# Patient Record
Sex: FEMALE | Race: WHITE | Employment: UNEMPLOYED | URBAN - NONMETROPOLITAN AREA
[De-identification: names, ages, dates, MRNs, and addresses within clinical notes are randomized per-mention and may not be internally consistent; named-entity substitution may affect disease eponyms.]

---

## 2018-07-09 ENCOUNTER — TELEPHONE (OUTPATIENT)
Dept: NEUROSURGERY | Age: 58
End: 2018-07-09

## 2018-07-10 ENCOUNTER — TELEPHONE (OUTPATIENT)
Dept: NEUROSURGERY | Age: 58
End: 2018-07-10

## 2018-08-03 ENCOUNTER — OFFICE VISIT (OUTPATIENT)
Dept: NEUROSURGERY | Age: 58
End: 2018-08-03
Payer: MEDICAID

## 2018-08-03 ENCOUNTER — HOSPITAL ENCOUNTER (OUTPATIENT)
Dept: GENERAL RADIOLOGY | Age: 58
Discharge: HOME | End: 2018-08-03
Payer: MEDICAID

## 2018-08-03 VITALS
OXYGEN SATURATION: 99 % | HEART RATE: 75 BPM | BODY MASS INDEX: 27.8 KG/M2 | SYSTOLIC BLOOD PRESSURE: 118 MMHG | DIASTOLIC BLOOD PRESSURE: 72 MMHG | WEIGHT: 173 LBS | HEIGHT: 66 IN

## 2018-08-03 DIAGNOSIS — G89.29 CHRONIC MIDLINE LOW BACK PAIN WITH RIGHT-SIDED SCIATICA: ICD-10-CM

## 2018-08-03 DIAGNOSIS — M79.604 RIGHT LEG PAIN: ICD-10-CM

## 2018-08-03 DIAGNOSIS — R26.89 ANTALGIC GAIT: ICD-10-CM

## 2018-08-03 DIAGNOSIS — M54.41 CHRONIC MIDLINE LOW BACK PAIN WITH RIGHT-SIDED SCIATICA: ICD-10-CM

## 2018-08-03 DIAGNOSIS — M43.16 SPONDYLOLISTHESIS OF LUMBAR REGION: ICD-10-CM

## 2018-08-03 DIAGNOSIS — R20.8 DECREASED SENSATION OF LEG: ICD-10-CM

## 2018-08-03 DIAGNOSIS — M54.41 CHRONIC MIDLINE LOW BACK PAIN WITH RIGHT-SIDED SCIATICA: Primary | ICD-10-CM

## 2018-08-03 DIAGNOSIS — G89.29 CHRONIC MIDLINE LOW BACK PAIN WITH RIGHT-SIDED SCIATICA: Primary | ICD-10-CM

## 2018-08-03 DIAGNOSIS — R29.898 RIGHT LEG WEAKNESS: ICD-10-CM

## 2018-08-03 PROCEDURE — G8419 CALC BMI OUT NRM PARAM NOF/U: HCPCS | Performed by: NURSE PRACTITIONER

## 2018-08-03 PROCEDURE — G8427 DOCREV CUR MEDS BY ELIG CLIN: HCPCS | Performed by: NURSE PRACTITIONER

## 2018-08-03 PROCEDURE — 4004F PT TOBACCO SCREEN RCVD TLK: CPT | Performed by: NURSE PRACTITIONER

## 2018-08-03 PROCEDURE — 99204 OFFICE O/P NEW MOD 45 MIN: CPT | Performed by: NURSE PRACTITIONER

## 2018-08-03 PROCEDURE — 3017F COLORECTAL CA SCREEN DOC REV: CPT | Performed by: NURSE PRACTITIONER

## 2018-08-03 PROCEDURE — 72110 X-RAY EXAM L-2 SPINE 4/>VWS: CPT

## 2018-08-03 RX ORDER — LISINOPRIL AND HYDROCHLOROTHIAZIDE 12.5; 1 MG/1; MG/1
TABLET ORAL
COMMUNITY
Start: 2009-12-28

## 2018-08-03 RX ORDER — METFORMIN HYDROCHLORIDE 500 MG/1
500 TABLET, EXTENDED RELEASE ORAL
COMMUNITY

## 2018-08-03 RX ORDER — PRAVASTATIN SODIUM 20 MG
20 TABLET ORAL DAILY
COMMUNITY

## 2018-08-03 RX ORDER — OMEPRAZOLE 20 MG/1
20 CAPSULE, DELAYED RELEASE ORAL 2 TIMES DAILY
COMMUNITY

## 2018-08-03 ASSESSMENT — ENCOUNTER SYMPTOMS
WHEEZING: 1
HEARTBURN: 0
COUGH: 1
SINUS PAIN: 1
CONSTIPATION: 0
BACK PAIN: 1
BLOOD IN STOOL: 0
SHORTNESS OF BREATH: 0
SPUTUM PRODUCTION: 0
STRIDOR: 0
DIARRHEA: 1
NAUSEA: 1
ORTHOPNEA: 0
ABDOMINAL PAIN: 1
HEMOPTYSIS: 0
EYES NEGATIVE: 1
VOMITING: 1
SORE THROAT: 0

## 2018-08-03 NOTE — PROGRESS NOTES
nervous/anxious. The patient does not have insomnia. PHYSICAL EXAM:  Vitals:    08/03/18 1206   BP: 118/72   Pulse: 75   SpO2: 99%     Constitutional: appears well-developed and well-nourished. Eyes  conjunctiva normal.  Pupils react to light  Ear, nose, throat -hearing intact to finger rub, No scars, masses, or lesions over external nose or ears, no atrophy of tongue  Neck-symmetric, no masses noted, no jugular vein distension  Respiration- chest wall appears symmetric, good expansion, normal effort without use of accessory muscles  Musculoskeletal  no significant wasting of muscles noted, no bony deformities, gait no gross ataxia  Extremities-no clubbing, cyanosis or edema  Skin  warm, dry, and intact. No rash, erythema, or pallor. Psychiatric  mood, affect, and behavior appear normal.     Neurologic Examination  Awake, Alert and oriented x 3  Normal speech pattern, following commands  Motor 4+/5 right iliopsoas; pain limited   Decrease to pinprick sensation right lateral shin, calf   Reflexes are 3+ and symmetric BUE, BLE (could be due to SSRI)  No clonus or Hoffmans sign  Severe myofacial tenderness to palpation  Antalgic Gait pattern        DATA and IMAGING:    Nursing/pcp notes, imaging, labs, and vitals reviewed. PT,OT and/or speech notes reviewed    No results found for: WBC, HGB, HCT, MCV, PLTNo results found for: NA, K, CL, CO2, BUN, CREATININE, GLUCOSE, CALCIUM, PROT, LABALBU, BILITOT, ALKPHOS, AST, ALT, LABGLOM, GFRAA, AGRATIO, GLOBNo results found for: INR, PROTIME    XR Lumbar Spine Report from 1/04/2016  Grade I anterolisthesis of L4 on L5     No recent images to view       ASSESSMENT:    Zaid Taylor is a 62 y.o. female with low back pain and right leg pain. ICD-10-CM ICD-9-CM    1. Chronic midline low back pain with right-sided sciatica M54.41 724.2 XR Lumbar Spine Ap Lateral Flexion and Extension    G89.29 724.3 MRI Lumbar Spine WO Contrast     338.29    2.  Right leg

## 2018-08-07 ENCOUNTER — TELEPHONE (OUTPATIENT)
Dept: NEUROSURGERY | Age: 58
End: 2018-08-07

## 2018-08-15 ENCOUNTER — HOSPITAL ENCOUNTER (OUTPATIENT)
Dept: MRI IMAGING | Age: 58
Discharge: HOME | End: 2018-08-15
Payer: MEDICAID

## 2018-08-15 DIAGNOSIS — M79.604 RIGHT LEG PAIN: ICD-10-CM

## 2018-08-15 DIAGNOSIS — M43.16 SPONDYLOLISTHESIS OF LUMBAR REGION: ICD-10-CM

## 2018-08-15 DIAGNOSIS — M54.41 CHRONIC MIDLINE LOW BACK PAIN WITH RIGHT-SIDED SCIATICA: ICD-10-CM

## 2018-08-15 DIAGNOSIS — R26.89 ANTALGIC GAIT: ICD-10-CM

## 2018-08-15 DIAGNOSIS — R20.8 DECREASED SENSATION OF LEG: ICD-10-CM

## 2018-08-15 DIAGNOSIS — G89.29 CHRONIC MIDLINE LOW BACK PAIN WITH RIGHT-SIDED SCIATICA: ICD-10-CM

## 2018-08-15 DIAGNOSIS — R29.898 RIGHT LEG WEAKNESS: ICD-10-CM

## 2018-08-15 PROCEDURE — 72148 MRI LUMBAR SPINE W/O DYE: CPT

## 2018-08-21 ENCOUNTER — OFFICE VISIT (OUTPATIENT)
Dept: NEUROSURGERY | Age: 58
End: 2018-08-21
Payer: MEDICAID

## 2018-08-21 VITALS
DIASTOLIC BLOOD PRESSURE: 73 MMHG | SYSTOLIC BLOOD PRESSURE: 112 MMHG | HEART RATE: 69 BPM | BODY MASS INDEX: 27.64 KG/M2 | WEIGHT: 172 LBS | OXYGEN SATURATION: 97 % | HEIGHT: 66 IN

## 2018-08-21 DIAGNOSIS — R20.8 DECREASED SENSATION OF LEG: ICD-10-CM

## 2018-08-21 DIAGNOSIS — M79.604 RIGHT LEG PAIN: ICD-10-CM

## 2018-08-21 DIAGNOSIS — G89.29 CHRONIC MIDLINE LOW BACK PAIN WITH RIGHT-SIDED SCIATICA: ICD-10-CM

## 2018-08-21 DIAGNOSIS — M54.41 CHRONIC MIDLINE LOW BACK PAIN WITH RIGHT-SIDED SCIATICA: ICD-10-CM

## 2018-08-21 DIAGNOSIS — M43.16 SPONDYLOLISTHESIS AT L4-L5 LEVEL: ICD-10-CM

## 2018-08-21 DIAGNOSIS — R29.898 RIGHT LEG WEAKNESS: ICD-10-CM

## 2018-08-21 DIAGNOSIS — R26.89 ANTALGIC GAIT: ICD-10-CM

## 2018-08-21 DIAGNOSIS — M79.605 LEFT LEG PAIN: ICD-10-CM

## 2018-08-21 DIAGNOSIS — M51.36 DDD (DEGENERATIVE DISC DISEASE), LUMBAR: Primary | ICD-10-CM

## 2018-08-21 PROCEDURE — 99213 OFFICE O/P EST LOW 20 MIN: CPT | Performed by: NEUROLOGICAL SURGERY

## 2018-08-21 PROCEDURE — 3017F COLORECTAL CA SCREEN DOC REV: CPT | Performed by: NEUROLOGICAL SURGERY

## 2018-08-21 PROCEDURE — G8427 DOCREV CUR MEDS BY ELIG CLIN: HCPCS | Performed by: NEUROLOGICAL SURGERY

## 2018-08-21 PROCEDURE — G8419 CALC BMI OUT NRM PARAM NOF/U: HCPCS | Performed by: NEUROLOGICAL SURGERY

## 2018-08-21 PROCEDURE — 4004F PT TOBACCO SCREEN RCVD TLK: CPT | Performed by: NEUROLOGICAL SURGERY

## 2018-08-21 NOTE — PROGRESS NOTES
Follow up
right-sided sciatica M54.41 724.2     G89.29 724.3      338.29    4. Right leg pain M79.604 729.5 Nerve conduction test      NM EMG 5+ Muscles Tested   5. Right leg weakness R29.898 729.89 Nerve conduction test      NM EMG 5+ Muscles Tested   6. Decreased sensation of leg R20.8 782.0 Nerve conduction test      NM EMG 5+ Muscles Tested   7. Antalgic gait R26.89 781.2 Nerve conduction test      NM EMG 5+ Muscles Tested   8. Left leg pain M79.605 729.5 Nerve conduction test      NM EMG 5+ Muscles Tested       PLAN:  -We have discussed and reviewed the results of the MRI lumbar spine with Mrs. Espino at length. We explained that she does have a small slip on her spine, however, given her described pain syndrome and her imaging, we do not feel that a neurosurgical intervention would dramatically improve her pain.   We recommend she continue non-operative treatments such as pain management, PT, massage therapy, etc.   -Obtain NCS/EMG     -Follow up as needed           Sunshine Estrada DO

## 2018-08-22 ENCOUNTER — TELEPHONE (OUTPATIENT)
Dept: NEUROSURGERY | Age: 58
End: 2018-08-22

## 2018-09-20 ENCOUNTER — HOSPITAL ENCOUNTER (OUTPATIENT)
Dept: NEUROLOGY | Age: 58
Discharge: HOME | End: 2018-09-20
Payer: MEDICAID

## 2018-09-20 PROCEDURE — 95910 NRV CNDJ TEST 7-8 STUDIES: CPT | Performed by: PSYCHIATRY & NEUROLOGY

## 2018-09-20 PROCEDURE — 95886 MUSC TEST DONE W/N TEST COMP: CPT | Performed by: PSYCHIATRY & NEUROLOGY

## 2018-09-20 PROCEDURE — 95910 NRV CNDJ TEST 7-8 STUDIES: CPT

## 2018-09-20 PROCEDURE — 95886 MUSC TEST DONE W/N TEST COMP: CPT

## 2018-10-02 ENCOUNTER — OFFICE VISIT (OUTPATIENT)
Dept: NEUROSURGERY | Age: 58
End: 2018-10-02
Payer: MEDICAID

## 2018-10-02 VITALS
BODY MASS INDEX: 27.64 KG/M2 | SYSTOLIC BLOOD PRESSURE: 133 MMHG | HEART RATE: 94 BPM | HEIGHT: 66 IN | OXYGEN SATURATION: 97 % | WEIGHT: 172 LBS | DIASTOLIC BLOOD PRESSURE: 70 MMHG

## 2018-10-02 DIAGNOSIS — M43.16 SPONDYLOLISTHESIS AT L4-L5 LEVEL: ICD-10-CM

## 2018-10-02 DIAGNOSIS — G89.29 CHRONIC MIDLINE LOW BACK PAIN WITH RIGHT-SIDED SCIATICA: ICD-10-CM

## 2018-10-02 DIAGNOSIS — M51.36 DDD (DEGENERATIVE DISC DISEASE), LUMBAR: Primary | ICD-10-CM

## 2018-10-02 DIAGNOSIS — M54.41 CHRONIC MIDLINE LOW BACK PAIN WITH RIGHT-SIDED SCIATICA: ICD-10-CM

## 2018-10-02 PROCEDURE — G8427 DOCREV CUR MEDS BY ELIG CLIN: HCPCS | Performed by: NEUROLOGICAL SURGERY

## 2018-10-02 PROCEDURE — 3017F COLORECTAL CA SCREEN DOC REV: CPT | Performed by: NEUROLOGICAL SURGERY

## 2018-10-02 PROCEDURE — G8484 FLU IMMUNIZE NO ADMIN: HCPCS | Performed by: NEUROLOGICAL SURGERY

## 2018-10-02 PROCEDURE — 99213 OFFICE O/P EST LOW 20 MIN: CPT | Performed by: NEUROLOGICAL SURGERY

## 2018-10-02 PROCEDURE — 4004F PT TOBACCO SCREEN RCVD TLK: CPT | Performed by: NEUROLOGICAL SURGERY

## 2018-10-02 PROCEDURE — G8419 CALC BMI OUT NRM PARAM NOF/U: HCPCS | Performed by: NEUROLOGICAL SURGERY

## 2018-10-02 NOTE — PROGRESS NOTES
weakness. Endo/Heme/Allergies: Positive for environmental allergies. Negative for polydipsia. Does not bruise/bleed easily. Psychiatric/Behavioral: Positive for depression. Negative for hallucinations, memory loss, substance abuse and suicidal ideas. The patient is nervous/anxious. The patient does not have insomnia. PHYSICAL EXAM:  Vitals:    10/02/18 0929   BP: 133/70   Pulse: 94   SpO2: 97%     Constitutional: appears well-developed and well-nourished. Eyes  conjunctiva normal.  Pupils react to light  Ear, nose, throat -hearing intact to finger rub, No scars, masses, or lesions over external nose or ears, no atrophy of tongue  Neck-symmetric, no masses noted, no jugular vein distension  Respiration- chest wall appears symmetric, good expansion, normal effort without use of accessory muscles  Musculoskeletal  no significant wasting of muscles noted, no bony deformities, gait no gross ataxia  Extremities-no clubbing, cyanosis or edema  Skin  warm, dry, and intact. No rash, erythema, or pallor. Psychiatric  mood, affect, and behavior appear normal.     Neurologic Examination  Awake, Alert and oriented x 3  Normal speech pattern, following commands  Motor 4+/5 right iliopsoas; pain limited   Decrease to pinprick sensation right lateral shin, calf   Reflexes are 3+ and symmetric BUE, BLE (could be due to SSRI, states she is on Celexa)  No clonus or Hoffmans sign  Severe myofacial tenderness to palpation  Antalgic Gait pattern        DATA and IMAGING:    Nursing/pcp notes, imaging, labs, and vitals reviewed.      PT,OT and/or speech notes reviewed    No results found for: WBC, HGB, HCT, MCV, PLTNo results found for: NA, K, CL, CO2, BUN, CREATININE, GLUCOSE, CALCIUM, PROT, LABALBU, BILITOT, ALKPHOS, AST, ALT, LABGLOM, GFRAA, AGRATIO, GLOBNo results found for: INR, PROTIME    XR Lumbar Spine Report from 1/04/2016  Grade I anterolisthesis of L4 on L5     Narrative   EXAM: MR LUMBOSACRAL SPINE WITHOUT IV

## 2020-10-18 DIAGNOSIS — R07.2 PRECORDIAL CHEST PAIN: Primary | ICD-10-CM

## 2020-10-18 RX ORDER — SODIUM CHLORIDE 9 MG/ML
75 INJECTION, SOLUTION INTRAVENOUS CONTINUOUS
Status: CANCELLED | OUTPATIENT
Start: 2020-10-18

## 2020-10-19 ENCOUNTER — TRANSCRIBE ORDERS (OUTPATIENT)
Dept: ADMINISTRATIVE | Facility: HOSPITAL | Age: 60
End: 2020-10-19

## 2020-10-19 DIAGNOSIS — Z01.818 PREOPERATIVE TESTING: Primary | ICD-10-CM

## 2020-10-19 PROBLEM — R07.2 PRECORDIAL CHEST PAIN: Status: ACTIVE | Noted: 2020-10-19

## 2020-10-20 ENCOUNTER — LAB (OUTPATIENT)
Dept: LAB | Facility: HOSPITAL | Age: 60
End: 2020-10-20

## 2020-10-20 PROCEDURE — U0003 INFECTIOUS AGENT DETECTION BY NUCLEIC ACID (DNA OR RNA); SEVERE ACUTE RESPIRATORY SYNDROME CORONAVIRUS 2 (SARS-COV-2) (CORONAVIRUS DISEASE [COVID-19]), AMPLIFIED PROBE TECHNIQUE, MAKING USE OF HIGH THROUGHPUT TECHNOLOGIES AS DESCRIBED BY CMS-2020-01-R: HCPCS | Performed by: INTERNAL MEDICINE

## 2020-10-20 PROCEDURE — C9803 HOPD COVID-19 SPEC COLLECT: HCPCS | Performed by: INTERNAL MEDICINE

## 2020-10-21 LAB
COVID LABCORP PRIORITY: NORMAL
SARS-COV-2 RNA RESP QL NAA+PROBE: NOT DETECTED

## 2020-10-23 ENCOUNTER — HOSPITAL ENCOUNTER (OUTPATIENT)
Facility: HOSPITAL | Age: 60
Discharge: HOME | End: 2020-10-23
Attending: INTERNAL MEDICINE | Admitting: INTERNAL MEDICINE

## 2020-10-23 VITALS
OXYGEN SATURATION: 96 % | TEMPERATURE: 97 F | DIASTOLIC BLOOD PRESSURE: 57 MMHG | BODY MASS INDEX: 28.57 KG/M2 | HEART RATE: 63 BPM | RESPIRATION RATE: 17 BRPM | HEIGHT: 66 IN | SYSTOLIC BLOOD PRESSURE: 120 MMHG | WEIGHT: 177.8 LBS

## 2020-10-23 DIAGNOSIS — R07.2 PRECORDIAL CHEST PAIN: ICD-10-CM

## 2020-10-23 LAB
ALBUMIN SERPL-MCNC: 4.2 G/DL (ref 3.5–5.2)
ALBUMIN/GLOB SERPL: 2.1 G/DL
ALP SERPL-CCNC: 84 U/L (ref 39–117)
ALT SERPL W P-5'-P-CCNC: 20 U/L (ref 1–33)
ANION GAP SERPL CALCULATED.3IONS-SCNC: 8 MMOL/L (ref 5–15)
AST SERPL-CCNC: 20 U/L (ref 1–32)
BASOPHILS # BLD AUTO: 0.04 10*3/MM3 (ref 0–0.2)
BASOPHILS NFR BLD AUTO: 0.5 % (ref 0–1.5)
BILIRUB SERPL-MCNC: 0.4 MG/DL (ref 0–1.2)
BUN SERPL-MCNC: 13 MG/DL (ref 8–23)
BUN/CREAT SERPL: 20.3 (ref 7–25)
CALCIUM SPEC-SCNC: 8.9 MG/DL (ref 8.6–10.5)
CHLORIDE SERPL-SCNC: 102 MMOL/L (ref 98–107)
CO2 SERPL-SCNC: 26 MMOL/L (ref 22–29)
CREAT SERPL-MCNC: 0.64 MG/DL (ref 0.57–1)
DEPRECATED RDW RBC AUTO: 43.2 FL (ref 37–54)
EOSINOPHIL # BLD AUTO: 0.33 10*3/MM3 (ref 0–0.4)
EOSINOPHIL NFR BLD AUTO: 4.4 % (ref 0.3–6.2)
ERYTHROCYTE [DISTWIDTH] IN BLOOD BY AUTOMATED COUNT: 13.3 % (ref 12.3–15.4)
GFR SERPL CREATININE-BSD FRML MDRD: 95 ML/MIN/1.73
GLOBULIN UR ELPH-MCNC: 2 GM/DL
GLUCOSE SERPL-MCNC: 127 MG/DL (ref 65–99)
HCT VFR BLD AUTO: 34.6 % (ref 34–46.6)
HGB BLD-MCNC: 12.2 G/DL (ref 12–15.9)
IMM GRANULOCYTES # BLD AUTO: 0.05 10*3/MM3 (ref 0–0.05)
IMM GRANULOCYTES NFR BLD AUTO: 0.7 % (ref 0–0.5)
INR PPP: 0.97 (ref 0.91–1.09)
LYMPHOCYTES # BLD AUTO: 2.17 10*3/MM3 (ref 0.7–3.1)
LYMPHOCYTES NFR BLD AUTO: 29.1 % (ref 19.6–45.3)
MCH RBC QN AUTO: 31.1 PG (ref 26.6–33)
MCHC RBC AUTO-ENTMCNC: 35.3 G/DL (ref 31.5–35.7)
MCV RBC AUTO: 88.3 FL (ref 79–97)
MONOCYTES # BLD AUTO: 0.45 10*3/MM3 (ref 0.1–0.9)
MONOCYTES NFR BLD AUTO: 6 % (ref 5–12)
NEUTROPHILS NFR BLD AUTO: 4.42 10*3/MM3 (ref 1.7–7)
NEUTROPHILS NFR BLD AUTO: 59.3 % (ref 42.7–76)
NRBC BLD AUTO-RTO: 0 /100 WBC (ref 0–0.2)
PLATELET # BLD AUTO: 187 10*3/MM3 (ref 140–450)
PMV BLD AUTO: 10.6 FL (ref 6–12)
POTASSIUM SERPL-SCNC: 3.8 MMOL/L (ref 3.5–5.2)
PROT SERPL-MCNC: 6.2 G/DL (ref 6–8.5)
PROTHROMBIN TIME: 12.5 SECONDS (ref 11.9–14.6)
RBC # BLD AUTO: 3.92 10*6/MM3 (ref 3.77–5.28)
SODIUM SERPL-SCNC: 136 MMOL/L (ref 136–145)
WBC # BLD AUTO: 7.46 10*3/MM3 (ref 3.4–10.8)

## 2020-10-23 PROCEDURE — C1894 INTRO/SHEATH, NON-LASER: HCPCS | Performed by: INTERNAL MEDICINE

## 2020-10-23 PROCEDURE — 93571 IV DOP VEL&/PRESS C FLO 1ST: CPT | Performed by: INTERNAL MEDICINE

## 2020-10-23 PROCEDURE — 93458 L HRT ARTERY/VENTRICLE ANGIO: CPT | Performed by: INTERNAL MEDICINE

## 2020-10-23 PROCEDURE — C1769 GUIDE WIRE: HCPCS | Performed by: INTERNAL MEDICINE

## 2020-10-23 PROCEDURE — 99152 MOD SED SAME PHYS/QHP 5/>YRS: CPT | Performed by: INTERNAL MEDICINE

## 2020-10-23 PROCEDURE — 85610 PROTHROMBIN TIME: CPT | Performed by: INTERNAL MEDICINE

## 2020-10-23 PROCEDURE — 25010000002 MIDAZOLAM HCL (PF) 5 MG/5ML SOLUTION: Performed by: INTERNAL MEDICINE

## 2020-10-23 PROCEDURE — 25010000002 FENTANYL CITRATE (PF) 100 MCG/2ML SOLUTION: Performed by: INTERNAL MEDICINE

## 2020-10-23 PROCEDURE — C1887 CATHETER, GUIDING: HCPCS | Performed by: INTERNAL MEDICINE

## 2020-10-23 PROCEDURE — 0 IOPAMIDOL PER 1 ML: Performed by: INTERNAL MEDICINE

## 2020-10-23 PROCEDURE — 25010000002 ADENOSINE (DIAGNOSTIC) PER 30 MG: Performed by: INTERNAL MEDICINE

## 2020-10-23 PROCEDURE — 25010000002 DIPHENHYDRAMINE PER 50 MG: Performed by: INTERNAL MEDICINE

## 2020-10-23 PROCEDURE — 85025 COMPLETE CBC W/AUTO DIFF WBC: CPT | Performed by: INTERNAL MEDICINE

## 2020-10-23 PROCEDURE — 80053 COMPREHEN METABOLIC PANEL: CPT | Performed by: INTERNAL MEDICINE

## 2020-10-23 PROCEDURE — 99153 MOD SED SAME PHYS/QHP EA: CPT | Performed by: INTERNAL MEDICINE

## 2020-10-23 PROCEDURE — S0260 H&P FOR SURGERY: HCPCS | Performed by: INTERNAL MEDICINE

## 2020-10-23 PROCEDURE — C1760 CLOSURE DEV, VASC: HCPCS | Performed by: INTERNAL MEDICINE

## 2020-10-23 PROCEDURE — 25010000002 HEPARIN (PORCINE) 2000-0.9 UNIT/L-% SOLUTION: Performed by: INTERNAL MEDICINE

## 2020-10-23 RX ORDER — FENTANYL CITRATE 50 UG/ML
INJECTION, SOLUTION INTRAMUSCULAR; INTRAVENOUS AS NEEDED
Status: DISCONTINUED | OUTPATIENT
Start: 2020-10-23 | End: 2020-10-23 | Stop reason: HOSPADM

## 2020-10-23 RX ORDER — ALOGLIPTIN AND METFORMIN HYDROCHLORIDE 12.5; 5 MG/1; MG/1
1 TABLET, FILM COATED ORAL 2 TIMES DAILY
COMMUNITY

## 2020-10-23 RX ORDER — LIDOCAINE HYDROCHLORIDE 20 MG/ML
INJECTION, SOLUTION INFILTRATION; PERINEURAL AS NEEDED
Status: DISCONTINUED | OUTPATIENT
Start: 2020-10-23 | End: 2020-10-23 | Stop reason: HOSPADM

## 2020-10-23 RX ORDER — LISINOPRIL AND HYDROCHLOROTHIAZIDE 12.5; 1 MG/1; MG/1
1 TABLET ORAL DAILY
COMMUNITY

## 2020-10-23 RX ORDER — DIPHENHYDRAMINE HYDROCHLORIDE 50 MG/ML
INJECTION INTRAMUSCULAR; INTRAVENOUS AS NEEDED
Status: DISCONTINUED | OUTPATIENT
Start: 2020-10-23 | End: 2020-10-23 | Stop reason: HOSPADM

## 2020-10-23 RX ORDER — AMLODIPINE BESYLATE 5 MG/1
5 TABLET ORAL DAILY
COMMUNITY

## 2020-10-23 RX ORDER — SODIUM CHLORIDE 9 MG/ML
75 INJECTION, SOLUTION INTRAVENOUS CONTINUOUS
Status: DISCONTINUED | OUTPATIENT
Start: 2020-10-23 | End: 2020-10-23 | Stop reason: HOSPADM

## 2020-10-23 RX ORDER — PRAVASTATIN SODIUM 20 MG
20 TABLET ORAL DAILY
COMMUNITY

## 2020-10-23 RX ORDER — ISOSORBIDE MONONITRATE 30 MG/1
30 TABLET, EXTENDED RELEASE ORAL DAILY
COMMUNITY

## 2020-10-23 RX ORDER — ACETAMINOPHEN 325 MG/1
650 TABLET ORAL EVERY 4 HOURS PRN
Status: DISCONTINUED | OUTPATIENT
Start: 2020-10-23 | End: 2020-10-23 | Stop reason: HOSPADM

## 2020-10-23 RX ORDER — CITALOPRAM 40 MG/1
40 TABLET ORAL DAILY
COMMUNITY

## 2020-10-23 RX ORDER — SODIUM CHLORIDE 9 MG/ML
100 INJECTION, SOLUTION INTRAVENOUS CONTINUOUS
Status: DISCONTINUED | OUTPATIENT
Start: 2020-10-23 | End: 2020-10-23 | Stop reason: HOSPADM

## 2020-10-23 RX ORDER — ASPIRIN 81 MG/1
81 TABLET, CHEWABLE ORAL DAILY
COMMUNITY

## 2020-10-23 RX ORDER — ASPIRIN 81 MG/1
TABLET, CHEWABLE ORAL
Status: COMPLETED
Start: 2020-10-23 | End: 2020-10-23

## 2020-10-23 RX ORDER — OMEPRAZOLE 40 MG/1
40 CAPSULE, DELAYED RELEASE ORAL DAILY
COMMUNITY

## 2020-10-23 RX ORDER — MIDAZOLAM HYDROCHLORIDE 1 MG/ML
INJECTION, SOLUTION INTRAMUSCULAR; INTRAVENOUS AS NEEDED
Status: DISCONTINUED | OUTPATIENT
Start: 2020-10-23 | End: 2020-10-23 | Stop reason: HOSPADM

## 2020-10-23 RX ORDER — ASPIRIN 81 MG/1
81 TABLET, CHEWABLE ORAL ONCE
Status: COMPLETED | OUTPATIENT
Start: 2020-10-23 | End: 2020-10-23

## 2020-10-23 RX ORDER — HEPARIN SODIUM 200 [USP'U]/100ML
INJECTION, SOLUTION INTRAVENOUS AS NEEDED
Status: DISCONTINUED | OUTPATIENT
Start: 2020-10-23 | End: 2020-10-23 | Stop reason: HOSPADM

## 2020-10-23 RX ADMIN — ASPIRIN 81 MG 81 MG: 81 TABLET ORAL at 14:45

## 2020-10-23 RX ADMIN — SODIUM CHLORIDE 75 ML/HR: 9 INJECTION, SOLUTION INTRAVENOUS at 14:00

## 2020-10-23 RX ADMIN — ASPIRIN 81 MG: 81 TABLET, CHEWABLE ORAL at 14:45

## 2024-08-15 ENCOUNTER — TELEPHONE (OUTPATIENT)
Dept: CARDIOLOGY | Facility: CLINIC | Age: 64
End: 2024-08-15
Payer: COMMERCIAL

## 2024-08-15 NOTE — TELEPHONE ENCOUNTER
Records/referral are available to view in media from [place] [facility] to place heart cath orders.    Please place order.    Thank you!    WF

## 2024-08-28 ENCOUNTER — TELEPHONE (OUTPATIENT)
Dept: CARDIOLOGY | Facility: CLINIC | Age: 64
End: 2024-08-28
Payer: COMMERCIAL

## 2024-08-28 DIAGNOSIS — I25.118 CORONARY ARTERY DISEASE OF NATIVE ARTERY OF NATIVE HEART WITH STABLE ANGINA PECTORIS: Primary | ICD-10-CM

## 2024-08-28 RX ORDER — SODIUM CHLORIDE 9 MG/ML
40 INJECTION, SOLUTION INTRAVENOUS AS NEEDED
OUTPATIENT
Start: 2024-08-28

## 2024-08-28 RX ORDER — SODIUM CHLORIDE 0.9 % (FLUSH) 0.9 %
3 SYRINGE (ML) INJECTION EVERY 12 HOURS SCHEDULED
OUTPATIENT
Start: 2024-08-28

## 2024-08-28 RX ORDER — SODIUM CHLORIDE 0.9 % (FLUSH) 0.9 %
10 SYRINGE (ML) INJECTION AS NEEDED
OUTPATIENT
Start: 2024-08-28

## 2024-08-28 NOTE — TELEPHONE ENCOUNTER
Hub staff attempted to follow warm transfer process and was unsuccessful     Caller: AVINASH    Relationship to patient: Provider    Best call back number: [phone]    Patient is needing:   AVINASH FROM [facility] NEEDS TO SPEAK WITH DALIA.

## 2024-09-10 PROBLEM — I25.118 CORONARY ARTERY DISEASE OF NATIVE ARTERY OF NATIVE HEART WITH STABLE ANGINA PECTORIS: Status: ACTIVE | Noted: 2024-08-28

## 2024-10-23 ENCOUNTER — HOSPITAL ENCOUNTER (OUTPATIENT)
Facility: HOSPITAL | Age: 64
Setting detail: HOSPITAL OUTPATIENT SURGERY
Discharge: HOME | End: 2024-10-23
Attending: INTERNAL MEDICINE | Admitting: INTERNAL MEDICINE
Payer: COMMERCIAL

## 2024-10-23 VITALS
HEIGHT: 64 IN | RESPIRATION RATE: 21 BRPM | BODY MASS INDEX: 29.5 KG/M2 | DIASTOLIC BLOOD PRESSURE: 48 MMHG | HEART RATE: 63 BPM | WEIGHT: 172.8 LBS | OXYGEN SATURATION: 97 % | SYSTOLIC BLOOD PRESSURE: 110 MMHG

## 2024-10-23 DIAGNOSIS — I25.118 CORONARY ARTERY DISEASE OF NATIVE ARTERY OF NATIVE HEART WITH STABLE ANGINA PECTORIS: ICD-10-CM

## 2024-10-23 DIAGNOSIS — I25.10 CAD, MULTIPLE VESSEL: Primary | ICD-10-CM

## 2024-10-23 LAB
ALBUMIN SERPL-MCNC: 4.5 G/DL (ref 3.5–5.2)
ALBUMIN/GLOB SERPL: 1.7 G/DL
ALP SERPL-CCNC: 94 U/L (ref 39–117)
ALT SERPL W P-5'-P-CCNC: 13 U/L (ref 1–33)
ANION GAP SERPL CALCULATED.3IONS-SCNC: 9 MMOL/L (ref 5–15)
AST SERPL-CCNC: 12 U/L (ref 1–32)
BASOPHILS # BLD AUTO: 0.06 10*3/MM3 (ref 0–0.2)
BASOPHILS NFR BLD AUTO: 0.6 % (ref 0–1.5)
BILIRUB SERPL-MCNC: 0.2 MG/DL (ref 0–1.2)
BUN SERPL-MCNC: 12 MG/DL (ref 8–23)
BUN/CREAT SERPL: 17.9 (ref 7–25)
CALCIUM SPEC-SCNC: 9.4 MG/DL (ref 8.6–10.5)
CHLORIDE SERPL-SCNC: 90 MMOL/L (ref 98–107)
CO2 SERPL-SCNC: 28 MMOL/L (ref 22–29)
CREAT SERPL-MCNC: 0.67 MG/DL (ref 0.57–1)
DEPRECATED RDW RBC AUTO: 40.9 FL (ref 37–54)
EGFRCR SERPLBLD CKD-EPI 2021: 97.7 ML/MIN/1.73
EOSINOPHIL # BLD AUTO: 0.34 10*3/MM3 (ref 0–0.4)
EOSINOPHIL NFR BLD AUTO: 3.5 % (ref 0.3–6.2)
ERYTHROCYTE [DISTWIDTH] IN BLOOD BY AUTOMATED COUNT: 12.6 % (ref 12.3–15.4)
GLOBULIN UR ELPH-MCNC: 2.6 GM/DL
GLUCOSE SERPL-MCNC: 296 MG/DL (ref 65–99)
HCT VFR BLD AUTO: 36.6 % (ref 34–46.6)
HGB BLD-MCNC: 13.3 G/DL (ref 12–15.9)
IMM GRANULOCYTES # BLD AUTO: 0.14 10*3/MM3 (ref 0–0.05)
IMM GRANULOCYTES NFR BLD AUTO: 1.5 % (ref 0–0.5)
INR PPP: 0.86 (ref 0.91–1.09)
LYMPHOCYTES # BLD AUTO: 2.08 10*3/MM3 (ref 0.7–3.1)
LYMPHOCYTES NFR BLD AUTO: 21.7 % (ref 19.6–45.3)
MCH RBC QN AUTO: 32 PG (ref 26.6–33)
MCHC RBC AUTO-ENTMCNC: 36.3 G/DL (ref 31.5–35.7)
MCV RBC AUTO: 88.2 FL (ref 79–97)
MONOCYTES # BLD AUTO: 0.53 10*3/MM3 (ref 0.1–0.9)
MONOCYTES NFR BLD AUTO: 5.5 % (ref 5–12)
NEUTROPHILS NFR BLD AUTO: 6.44 10*3/MM3 (ref 1.7–7)
NEUTROPHILS NFR BLD AUTO: 67.2 % (ref 42.7–76)
NRBC BLD AUTO-RTO: 0 /100 WBC (ref 0–0.2)
PLATELET # BLD AUTO: 243 10*3/MM3 (ref 140–450)
PMV BLD AUTO: 9.7 FL (ref 6–12)
POTASSIUM SERPL-SCNC: 4.6 MMOL/L (ref 3.5–5.2)
PROT SERPL-MCNC: 7.1 G/DL (ref 6–8.5)
PROTHROMBIN TIME: 12.1 SECONDS (ref 11.8–14.8)
RBC # BLD AUTO: 4.15 10*6/MM3 (ref 3.77–5.28)
SODIUM SERPL-SCNC: 127 MMOL/L (ref 136–145)
WBC NRBC COR # BLD AUTO: 9.59 10*3/MM3 (ref 3.4–10.8)

## 2024-10-23 PROCEDURE — 25010000002 FENTANYL CITRATE (PF) 50 MCG/ML SOLUTION: Performed by: INTERNAL MEDICINE

## 2024-10-23 PROCEDURE — 25010000002 HEPARIN (PORCINE) 1000-0.9 UT/500ML-% SOLUTION: Performed by: INTERNAL MEDICINE

## 2024-10-23 PROCEDURE — 25010000002 MIDAZOLAM HCL (PF) 5 MG/5ML SOLUTION: Performed by: INTERNAL MEDICINE

## 2024-10-23 PROCEDURE — 25510000001 IOPAMIDOL PER 1 ML: Performed by: INTERNAL MEDICINE

## 2024-10-23 PROCEDURE — 80053 COMPREHEN METABOLIC PANEL: CPT | Performed by: INTERNAL MEDICINE

## 2024-10-23 PROCEDURE — 93455 CORONARY ART/GRFT ANGIO S&I: CPT | Performed by: INTERNAL MEDICINE

## 2024-10-23 PROCEDURE — C1894 INTRO/SHEATH, NON-LASER: HCPCS | Performed by: INTERNAL MEDICINE

## 2024-10-23 PROCEDURE — 85610 PROTHROMBIN TIME: CPT | Performed by: INTERNAL MEDICINE

## 2024-10-23 PROCEDURE — S0260 H&P FOR SURGERY: HCPCS | Performed by: INTERNAL MEDICINE

## 2024-10-23 PROCEDURE — C1769 GUIDE WIRE: HCPCS | Performed by: INTERNAL MEDICINE

## 2024-10-23 PROCEDURE — 93454 CORONARY ARTERY ANGIO S&I: CPT | Performed by: INTERNAL MEDICINE

## 2024-10-23 PROCEDURE — 85025 COMPLETE CBC W/AUTO DIFF WBC: CPT | Performed by: INTERNAL MEDICINE

## 2024-10-23 PROCEDURE — 99152 MOD SED SAME PHYS/QHP 5/>YRS: CPT | Performed by: INTERNAL MEDICINE

## 2024-10-23 PROCEDURE — 25010000002 LIDOCAINE 2% SOLUTION: Performed by: INTERNAL MEDICINE

## 2024-10-23 PROCEDURE — 25010000002 HEPARIN (PORCINE) 2000-0.9 UNIT/L-% SOLUTION: Performed by: INTERNAL MEDICINE

## 2024-10-23 PROCEDURE — 25010000002 DIPHENHYDRAMINE PER 50 MG: Performed by: INTERNAL MEDICINE

## 2024-10-23 RX ORDER — HEPARIN SODIUM 200 [USP'U]/100ML
INJECTION, SOLUTION INTRAVENOUS
Status: DISCONTINUED | OUTPATIENT
Start: 2024-10-23 | End: 2024-10-23 | Stop reason: HOSPADM

## 2024-10-23 RX ORDER — VERAPAMIL HYDROCHLORIDE 2.5 MG/ML
INJECTION, SOLUTION INTRAVENOUS
Status: DISCONTINUED | OUTPATIENT
Start: 2024-10-23 | End: 2024-10-23 | Stop reason: HOSPADM

## 2024-10-23 RX ORDER — FENTANYL CITRATE 50 UG/ML
INJECTION, SOLUTION INTRAMUSCULAR; INTRAVENOUS
Status: DISCONTINUED | OUTPATIENT
Start: 2024-10-23 | End: 2024-10-23 | Stop reason: HOSPADM

## 2024-10-23 RX ORDER — SODIUM CHLORIDE 0.9 % (FLUSH) 0.9 %
3 SYRINGE (ML) INJECTION EVERY 12 HOURS SCHEDULED
Status: DISCONTINUED | OUTPATIENT
Start: 2024-10-23 | End: 2024-10-23 | Stop reason: HOSPADM

## 2024-10-23 RX ORDER — ACETAMINOPHEN 325 MG/1
650 TABLET ORAL EVERY 4 HOURS PRN
Status: CANCELLED | OUTPATIENT
Start: 2024-10-23

## 2024-10-23 RX ORDER — IOPAMIDOL 755 MG/ML
INJECTION, SOLUTION INTRAVASCULAR
Status: DISCONTINUED | OUTPATIENT
Start: 2024-10-23 | End: 2024-10-23 | Stop reason: HOSPADM

## 2024-10-23 RX ORDER — BUSPIRONE HYDROCHLORIDE 5 MG/1
5 TABLET ORAL NIGHTLY
COMMUNITY

## 2024-10-23 RX ORDER — LIDOCAINE HYDROCHLORIDE 20 MG/ML
INJECTION, SOLUTION INFILTRATION; PERINEURAL
Status: DISCONTINUED | OUTPATIENT
Start: 2024-10-23 | End: 2024-10-23 | Stop reason: HOSPADM

## 2024-10-23 RX ORDER — SODIUM CHLORIDE 9 MG/ML
100 INJECTION, SOLUTION INTRAVENOUS CONTINUOUS
Status: CANCELLED | OUTPATIENT
Start: 2024-10-23 | End: 2024-10-23

## 2024-10-23 RX ORDER — SODIUM CHLORIDE 9 MG/ML
40 INJECTION, SOLUTION INTRAVENOUS AS NEEDED
Status: DISCONTINUED | OUTPATIENT
Start: 2024-10-23 | End: 2024-10-23 | Stop reason: HOSPADM

## 2024-10-23 RX ORDER — ASPIRIN 81 MG/1
TABLET, CHEWABLE ORAL
Status: DISCONTINUED | OUTPATIENT
Start: 2024-10-23 | End: 2024-10-23 | Stop reason: HOSPADM

## 2024-10-23 RX ORDER — MIDAZOLAM HYDROCHLORIDE 5 MG/5ML
INJECTION, SOLUTION INTRAMUSCULAR; INTRAVENOUS
Status: DISCONTINUED | OUTPATIENT
Start: 2024-10-23 | End: 2024-10-23 | Stop reason: HOSPADM

## 2024-10-23 RX ORDER — SODIUM CHLORIDE 0.9 % (FLUSH) 0.9 %
10 SYRINGE (ML) INJECTION AS NEEDED
Status: DISCONTINUED | OUTPATIENT
Start: 2024-10-23 | End: 2024-10-23 | Stop reason: HOSPADM

## 2024-10-23 RX ORDER — SODIUM CHLORIDE 0.9 % (FLUSH) 0.9 %
10 SYRINGE (ML) INJECTION AS NEEDED
Status: CANCELLED | OUTPATIENT
Start: 2024-10-23

## 2024-10-23 RX ORDER — SODIUM CHLORIDE 0.9 % (FLUSH) 0.9 %
10 SYRINGE (ML) INJECTION EVERY 12 HOURS SCHEDULED
Status: CANCELLED | OUTPATIENT
Start: 2024-10-23

## 2024-10-23 RX ORDER — DIPHENHYDRAMINE HYDROCHLORIDE 50 MG/ML
INJECTION INTRAMUSCULAR; INTRAVENOUS
Status: DISCONTINUED | OUTPATIENT
Start: 2024-10-23 | End: 2024-10-23 | Stop reason: HOSPADM

## 2024-10-23 NOTE — H&P
LOS: 0 days   Patient Care Team:  Neymar Buenrostro MD as PCP - General (Family Medicine)  Devante Wang MD as Cardiologist (Cardiology)    Chief Complaint: Shortness of breath and chest pain      Subjective    Nemo Lazaro is a 64 y.o. female who is being seen  Subjective     Chest pain both with exertion as well as at rest.  Feels episodes of chest pain occur no more often with exertion  Moderate substernal,   Pressure like   Chest pain non pleuritic  Chest pain non positional and non gustatory   Lasts less than 5 minutes   Occurs once or twice a week on the average but can be variable in frequency  No associated diaphoresis    No associated nausea  No radiation    Relieved with rest or spontaneously  Not positional    No change with intake of food or antacids  No change with breathing  Mild to moderate associated dyspnea    No similar chest pain episodes in the past         No anticipated endoscopic or any surgical procedures over the next 1 year    No bleeding, excessive bruising, gait instability or fall risks      Review of Systems   Constitutional: No chills   Has fatigue   No fever.   HENT: Negative.    Eyes: Negative.    Respiratory: Negative for cough,   No chest wall soreness,   Shortness of breath,   no wheezing, no stridor.    Cardiovascular: As above  Gastrointestinal: Negative for abdominal distention,  No abdominal pain,   No blood in stool,   No constipation,   No diarrhea,   No nausea   No vomiting.   Endocrine: Negative.    Genitourinary: Negative for difficulty urinating, dysuria, flank pain and hematuria.   Musculoskeletal: Negative.    Skin: Negative for rash and wound.   Allergic/Immunologic: Negative.    Neurological: Negative for dizziness, syncope, weakness,   No light-headedness  No  headaches.   Hematological: Does not bruise/bleed easily.   Psychiatric/Behavioral: Negative for agitation or behavioral problems,   No confusion,   the patient is  nervous/anxious.    "    History:   Past Medical History:   Diagnosis Date    Anxiety     Depression     Diabetes mellitus     type 2    GERD (gastroesophageal reflux disease)     Hyperlipidemia     Hypertension      Past Surgical History:   Procedure Laterality Date    APPENDECTOMY      CARDIAC CATHETERIZATION N/A 10/23/2020    Procedure: Cardiac Catheterization/Vascular Study;  Surgeon: Devante Wang MD;  Location:  [facility];  Service: Cardiology;  Laterality: N/A;     SECTION      x2    CHOLECYSTECTOMY      HYSTERECTOMY       Social History     Socioeconomic History    Marital status:    Tobacco Use    Smoking status: Every Day     Current packs/day: 1.00     Average packs/day: 1 pack/day for 44.0 years (44.0 ttl pk-yrs)     Types: Cigarettes     History reviewed. No pertinent family history.    Labs:  WBC WBC   Date Value Ref Range Status   10/23/2024 9.59 3.40 - 10.80 10*3/mm3 Final      HGB Hemoglobin   Date Value Ref Range Status   10/23/2024 13.3 12.0 - 15.9 g/dL Final      HCT Hematocrit   Date Value Ref Range Status   10/23/2024 36.6 34.0 - 46.6 % Final      Platelets Platelets   Date Value Ref Range Status   10/23/2024 243 140 - 450 10*3/mm3 Final      MCV MCV   Date Value Ref Range Status   10/23/2024 88.2 79.0 - 97.0 fL Final        Results from last 7 days   Lab Units 10/23/24  1128   SODIUM mmol/L 127*   POTASSIUM mmol/L 4.6   CHLORIDE mmol/L 90*   CO2 mmol/L 28.0   BUN mg/dL 12   CREATININE mg/dL 0.67   CALCIUM mg/dL 9.4   BILIRUBIN mg/dL 0.2   ALK PHOS U/L 94   ALT (SGPT) U/L 13   AST (SGOT) U/L 12   GLUCOSE mg/dL 296*   No results found for: \"CKTOTAL\", \"CKMB\", \"CKMBINDEX\", \"TROPONINI\", \"TROPONINT\"  PT/INR:    Protime   Date Value Ref Range Status   10/23/2024 12.1 11.8 - 14.8 Seconds Final   /  INR   Date Value Ref Range Status   10/23/2024 0.86 (L) 0.91 - 1.09 Final       Imaging Results (Last 72 Hours)       ** No results found for the last 72 hours. **            Objective " "    Allergies   Allergen Reactions    Penicillins Hives       Medication Review: Performed  Current Facility-Administered Medications   Medication Dose Route Frequency Provider Last Rate Last Admin    sodium chloride 0.9 % bolus 200 mL  200 mL Intravenous Once Devante Wang MD        sodium chloride 0.9 % flush 10 mL  10 mL Intravenous PRN Devante Wang MD        sodium chloride 0.9 % flush 3 mL  3 mL Intravenous Q12H Devante Wang MD        sodium chloride 0.9 % infusion 40 mL  40 mL Intravenous PRN Devante Wang MD           Vital Sign Min/Max for last 24 hours  No data recorded   BP  Min: 132/78  Max: 132/78   Pulse  Min: 63  Max: 63   Resp  Min: 16  Max: 16   SpO2  Min: 96 %  Max: 96 %   No data recorded   Weight  Min: 78.4 kg (172 lb 12.8 oz)  Max: 78.4 kg (172 lb 12.8 oz)     Flowsheet Rows      Flowsheet Row First Filed Value   Admission Height 162.6 cm (64\") Documented at 10/23/2024 1136   Admission Weight 78.4 kg (172 lb 12.8 oz) Documented at 10/23/2024 1136                Physical Exam:    General Appearance: Awake, alert, in no acute distress  Eyes: Pupils equal and reactive    Ears: Appear intact with no abnormalities noted  Nose: Nares normal, no drainage  Neck: supple, trachea midline, no carotid bruit and no JVD  Back: no kyphosis present,    Lungs: respirations regular, respirations even and respirations unlabored  Heart: normal S1, S2, no significant murmurs   No gallops or rubs  no rub and no click  Abdomen: normal bowel sounds, no tenderness   Skin: no bleeding, bruising or rash  Extremities: no cyanosis  Psychiatric/Behavioral: Negative for agitation, behavioral problems, confusion, the patient does  appear to be nervous/anxious.       Results Review:   I reviewed the patient's new clinical results.  I reviewed the patient's new imaging results and agree with the interpretation.  I reviewed the patient's other test results and agree with the interpretation  I personally viewed and interpreted " the patient's EKG/Telemetry data    Discussed with patient  Updated patient regarding any new or relevant abnormalities on review of records or any new findings on physical exam.   Mentioned to patient about purpose of visit and desirable health short and long term goals and objectives.     Reviewed available prior notes, consults, prior visits, laboratory findings, radiology and cardiology relevant reports.   Updated chart as applicable.   I have reviewed the patient's medical history in detail and updated the computerized patient record as relevant.          Assessment & Plan       Coronary artery disease of native artery of native heart with stable angina pectoris  chest pain   Smoker   Strong family history of early coronary artery disease    essential hypertension   Hyperlipidemia    Type 2 diabetes mellitus     Plan    Recommend cardiac catheterization, selective coronary angiography, left ventriculography and percutaneous coronary intervention with application of arteriotomy hemostatic closure device.    I discussed cardiac catheterization, the procedure, risks (including bleeding, infection, vascular damage [including minor oozing, bruising, bleeding, and up to and including but not limited to the need for vascular surgery, emergency cardiothoracic surgery, contrast reaction, renal failure, respiratory failure, heart attack, stroke, arrhythmia and even death), benefits, and alternatives and the patient has voiced understanding and is willing to proceed.    Adequate pre-hydration and post cardiac catheterization hydration.  Premedications as required and indicated for cardiac catheterization.    No contraindication to drug eluting stent placement if required  Further recommendations pending results of cardiac catheterization        Devante Wang MD  10/23/24  12:26 CDT    EMR Dragon/Transcription was used to dictate part of this note

## 2024-10-23 NOTE — Clinical Note
Allergies reviewed.  H&P note has been confirmed for the patient. Procedural consent has been signed.  Staff has reviewed the patient's labs.  Labs have been reviewed and are in within normal limits, The patient has denied she is pregnant. Vinay Odom left a voicemail message stating she needs to see Dr Sneha Ramachandran as the tube that was put in her ear never came out. She recently saw her doctor for a respiratory infection and the doctor told her that the tube is stuck in wax. Please advise as to how soon this appointment should be scheduled. Thank you.

## 2024-11-06 ENCOUNTER — OFFICE VISIT (OUTPATIENT)
Dept: CARDIAC SURGERY | Facility: CLINIC | Age: 64
End: 2024-11-06
Payer: COMMERCIAL

## 2024-11-06 VITALS
DIASTOLIC BLOOD PRESSURE: 65 MMHG | HEIGHT: 64 IN | SYSTOLIC BLOOD PRESSURE: 144 MMHG | WEIGHT: 175 LBS | BODY MASS INDEX: 29.88 KG/M2 | HEART RATE: 86 BPM | OXYGEN SATURATION: 95 %

## 2024-11-06 DIAGNOSIS — I25.118 CORONARY ARTERY DISEASE OF NATIVE ARTERY OF NATIVE HEART WITH STABLE ANGINA PECTORIS: Primary | ICD-10-CM

## 2024-11-06 DIAGNOSIS — E11.65 TYPE 2 DIABETES MELLITUS WITH HYPERGLYCEMIA, UNSPECIFIED WHETHER LONG TERM INSULIN USE: ICD-10-CM

## 2024-11-06 PROCEDURE — 1160F RVW MEDS BY RX/DR IN RCRD: CPT | Performed by: THORACIC SURGERY (CARDIOTHORACIC VASCULAR SURGERY)

## 2024-11-06 PROCEDURE — 1159F MED LIST DOCD IN RCRD: CPT | Performed by: THORACIC SURGERY (CARDIOTHORACIC VASCULAR SURGERY)

## 2024-11-06 PROCEDURE — 99204 OFFICE O/P NEW MOD 45 MIN: CPT | Performed by: THORACIC SURGERY (CARDIOTHORACIC VASCULAR SURGERY)

## 2024-11-06 RX ORDER — SEMAGLUTIDE 0.68 MG/ML
INJECTION, SOLUTION SUBCUTANEOUS
COMMUNITY
Start: 2024-10-19

## 2024-11-06 RX ORDER — INSULIN DETEMIR 100 [IU]/ML
65 INJECTION, SOLUTION SUBCUTANEOUS 2 TIMES DAILY
COMMUNITY

## 2024-11-06 RX ORDER — LEVOCETIRIZINE DIHYDROCHLORIDE 5 MG/1
1 TABLET, FILM COATED ORAL DAILY
COMMUNITY
Start: 2024-10-22

## 2024-11-12 ENCOUNTER — TELEPHONE (OUTPATIENT)
Dept: CARDIAC SURGERY | Facility: CLINIC | Age: 64
End: 2024-11-12
Payer: COMMERCIAL

## 2024-11-12 NOTE — TELEPHONE ENCOUNTER
Called patient re: reminder to get A1c drawn tomorrow. Patient advises she did not know of appts tomorrow. Upon research, it looks like scheduling got these scheduled and took them out of our WQ, so we had no idea of appt date/time. She is agreeable with all appts tomorrow and aware to get lab as well.

## 2024-11-13 ENCOUNTER — HOSPITAL ENCOUNTER (OUTPATIENT)
Dept: ULTRASOUND IMAGING | Facility: HOSPITAL | Age: 64
Discharge: HOME | End: 2024-11-13
Payer: COMMERCIAL

## 2024-11-13 ENCOUNTER — HOSPITAL ENCOUNTER (OUTPATIENT)
Dept: PULMONOLOGY | Facility: HOSPITAL | Age: 64
Discharge: HOME | End: 2024-11-13
Payer: COMMERCIAL

## 2024-11-13 ENCOUNTER — HOSPITAL ENCOUNTER (OUTPATIENT)
Dept: CARDIOLOGY | Facility: HOSPITAL | Age: 64
Discharge: HOME | End: 2024-11-13
Payer: COMMERCIAL

## 2024-11-13 ENCOUNTER — LAB (OUTPATIENT)
Dept: LAB | Facility: HOSPITAL | Age: 64
End: 2024-11-13
Payer: COMMERCIAL

## 2024-11-13 VITALS
BODY MASS INDEX: 29.71 KG/M2 | DIASTOLIC BLOOD PRESSURE: 48 MMHG | SYSTOLIC BLOOD PRESSURE: 110 MMHG | HEIGHT: 64 IN | WEIGHT: 174 LBS

## 2024-11-13 DIAGNOSIS — E11.65 TYPE 2 DIABETES MELLITUS WITH HYPERGLYCEMIA, UNSPECIFIED WHETHER LONG TERM INSULIN USE: ICD-10-CM

## 2024-11-13 DIAGNOSIS — I25.118 CORONARY ARTERY DISEASE OF NATIVE ARTERY OF NATIVE HEART WITH STABLE ANGINA PECTORIS: ICD-10-CM

## 2024-11-13 LAB
ARTERIAL PATENCY WRIST A: NORMAL
ATMOSPHERIC PRESS: 753 MMHG
BASE EXCESS BLDA CALC-SCNC: 1.2 MMOL/L (ref 0–2)
BDY SITE: NORMAL
BH CV ECHO LEFT VENTRICLE GLOBAL LONGITUDINAL STRAIN: -17.2 %
BH CV ECHO MEAS - AO MAX PG: 6.6 MMHG
BH CV ECHO MEAS - AO MEAN PG: 3 MMHG
BH CV ECHO MEAS - AO ROOT DIAM: 2.5 CM
BH CV ECHO MEAS - AO V2 MAX: 128 CM/SEC
BH CV ECHO MEAS - AO V2 VTI: 26.9 CM
BH CV ECHO MEAS - AVA(I,D): 2.28 CM2
BH CV ECHO MEAS - EDV(CUBED): 68.9 ML
BH CV ECHO MEAS - EDV(MOD-SP2): 48.8 ML
BH CV ECHO MEAS - EDV(MOD-SP4): 45.4 ML
BH CV ECHO MEAS - EF(MOD-BP): 71.5 %
BH CV ECHO MEAS - EF(MOD-SP2): 75.6 %
BH CV ECHO MEAS - EF(MOD-SP4): 68.7 %
BH CV ECHO MEAS - ESV(CUBED): 10.6 ML
BH CV ECHO MEAS - ESV(MOD-SP2): 11.9 ML
BH CV ECHO MEAS - ESV(MOD-SP4): 14.2 ML
BH CV ECHO MEAS - FS: 46.3 %
BH CV ECHO MEAS - IVS/LVPW: 0.91 CM
BH CV ECHO MEAS - IVSD: 1 CM
BH CV ECHO MEAS - LA DIMENSION: 3.3 CM
BH CV ECHO MEAS - LAT PEAK E' VEL: 9.7 CM/SEC
BH CV ECHO MEAS - LV DIASTOLIC VOL/BSA (35-75): 24.7 CM2
BH CV ECHO MEAS - LV MASS(C)D: 141.5 GRAMS
BH CV ECHO MEAS - LV MAX PG: 4 MMHG
BH CV ECHO MEAS - LV MEAN PG: 2 MMHG
BH CV ECHO MEAS - LV SYSTOLIC VOL/BSA (12-30): 7.7 CM2
BH CV ECHO MEAS - LV V1 MAX: 99.9 CM/SEC
BH CV ECHO MEAS - LV V1 VTI: 21.6 CM
BH CV ECHO MEAS - LVIDD: 4.1 CM
BH CV ECHO MEAS - LVIDS: 2.2 CM
BH CV ECHO MEAS - LVOT AREA: 2.8 CM2
BH CV ECHO MEAS - LVOT DIAM: 1.9 CM
BH CV ECHO MEAS - LVPWD: 1.1 CM
BH CV ECHO MEAS - MED PEAK E' VEL: 6.7 CM/SEC
BH CV ECHO MEAS - MV A MAX VEL: 63.8 CM/SEC
BH CV ECHO MEAS - MV DEC SLOPE: 265 CM/SEC2
BH CV ECHO MEAS - MV DEC TIME: 0.29 SEC
BH CV ECHO MEAS - MV E MAX VEL: 50.6 CM/SEC
BH CV ECHO MEAS - MV E/A: 0.79
BH CV ECHO MEAS - MV MAX PG: 3.1 MMHG
BH CV ECHO MEAS - MV MEAN PG: 1 MMHG
BH CV ECHO MEAS - MV P1/2T: 87 MSEC
BH CV ECHO MEAS - MV V2 VTI: 26.6 CM
BH CV ECHO MEAS - MVA(P1/2T): 2.5 CM2
BH CV ECHO MEAS - MVA(VTI): 2.3 CM2
BH CV ECHO MEAS - PA V2 MAX: 92.3 CM/SEC
BH CV ECHO MEAS - RAP SYSTOLE: 8 MMHG
BH CV ECHO MEAS - RVSP: 11.9 MMHG
BH CV ECHO MEAS - SV(LVOT): 61.2 ML
BH CV ECHO MEAS - SV(MOD-SP2): 36.9 ML
BH CV ECHO MEAS - SV(MOD-SP4): 31.2 ML
BH CV ECHO MEAS - SVI(LVOT): 33.4 ML/M2
BH CV ECHO MEAS - SVI(MOD-SP2): 20.1 ML/M2
BH CV ECHO MEAS - SVI(MOD-SP4): 17 ML/M2
BH CV ECHO MEAS - TAPSE (>1.6): 2.1 CM
BH CV ECHO MEAS - TR MAX PG: 3.9 MMHG
BH CV ECHO MEAS - TR MAX VEL: 99.1 CM/SEC
BH CV ECHO MEASUREMENTS AVERAGE E/E' RATIO: 6.17
BH CV XLRA - TDI S': 11.9 CM/SEC
BODY TEMPERATURE: 37
HBA1C MFR BLD: 7.6 % (ref 4.8–5.6)
HCO3 BLDA-SCNC: 25 MMOL/L (ref 20–26)
LEFT ATRIUM VOLUME INDEX: 15.6 ML/M2
LEFT ATRIUM VOLUME: 28.6 ML
Lab: NORMAL
MODALITY: NORMAL
PCO2 BLDA: 36.2 MM HG (ref 35–45)
PCO2 TEMP ADJ BLD: 36.2 MM HG (ref 35–45)
PH BLDA: 7.45 PH UNITS (ref 7.35–7.45)
PH, TEMP CORRECTED: 7.45 PH UNITS (ref 7.35–7.45)
PO2 BLDA: 85.7 MM HG (ref 83–108)
PO2 TEMP ADJ BLD: 85.7 MM HG (ref 83–108)
SAO2 % BLDCOA: 98 % (ref 94–99)
VENTILATOR MODE: NORMAL

## 2024-11-13 PROCEDURE — 94726 PLETHYSMOGRAPHY LUNG VOLUMES: CPT

## 2024-11-13 PROCEDURE — 93880 EXTRACRANIAL BILAT STUDY: CPT | Performed by: SURGERY

## 2024-11-13 PROCEDURE — 82803 BLOOD GASES ANY COMBINATION: CPT

## 2024-11-13 PROCEDURE — 94060 EVALUATION OF WHEEZING: CPT

## 2024-11-13 PROCEDURE — 93970 EXTREMITY STUDY: CPT

## 2024-11-13 PROCEDURE — 36600 WITHDRAWAL OF ARTERIAL BLOOD: CPT

## 2024-11-13 PROCEDURE — 93356 MYOCRD STRAIN IMG SPCKL TRCK: CPT

## 2024-11-13 PROCEDURE — 36415 COLL VENOUS BLD VENIPUNCTURE: CPT

## 2024-11-13 PROCEDURE — 93970 EXTREMITY STUDY: CPT | Performed by: SURGERY

## 2024-11-13 PROCEDURE — 93880 EXTRACRANIAL BILAT STUDY: CPT

## 2024-11-13 PROCEDURE — 83036 HEMOGLOBIN GLYCOSYLATED A1C: CPT

## 2024-11-13 PROCEDURE — 94729 DIFFUSING CAPACITY: CPT

## 2024-11-13 PROCEDURE — 93306 TTE W/DOPPLER COMPLETE: CPT

## 2024-11-13 RX ORDER — ALBUTEROL SULFATE 0.83 MG/ML
2.5 SOLUTION RESPIRATORY (INHALATION) ONCE
Status: COMPLETED | OUTPATIENT
Start: 2024-11-13 | End: 2024-11-13

## 2024-11-13 RX ADMIN — ALBUTEROL SULFATE 2.5 MG: 2.5 SOLUTION RESPIRATORY (INHALATION) at 11:46

## 2024-11-14 ENCOUNTER — PREP FOR SURGERY (OUTPATIENT)
Dept: OTHER | Facility: HOSPITAL | Age: 64
End: 2024-11-14
Payer: COMMERCIAL

## 2024-11-14 DIAGNOSIS — I25.118 CORONARY ARTERY DISEASE OF NATIVE ARTERY OF NATIVE HEART WITH STABLE ANGINA PECTORIS: Primary | ICD-10-CM

## 2024-11-14 RX ORDER — SODIUM CHLORIDE 0.9 % (FLUSH) 0.9 %
30 SYRINGE (ML) INJECTION ONCE AS NEEDED
OUTPATIENT
Start: 2024-11-14

## 2024-11-14 RX ORDER — CHLORHEXIDINE GLUCONATE 500 MG/1
CLOTH TOPICAL EVERY 12 HOURS PRN
OUTPATIENT
Start: 2024-11-14

## 2024-11-14 RX ORDER — NICOTINE POLACRILEX 4 MG
15 LOZENGE BUCCAL
OUTPATIENT
Start: 2024-11-14

## 2024-11-14 RX ORDER — SODIUM CHLORIDE 0.9 % (FLUSH) 0.9 %
3 SYRINGE (ML) INJECTION EVERY 12 HOURS SCHEDULED
OUTPATIENT
Start: 2024-11-14

## 2024-11-14 RX ORDER — SODIUM CHLORIDE 0.9 % (FLUSH) 0.9 %
3-10 SYRINGE (ML) INJECTION AS NEEDED
OUTPATIENT
Start: 2024-11-14

## 2024-11-14 RX ORDER — VANCOMYCIN 1.75 G/350ML
15 INJECTION, SOLUTION INTRAVENOUS ONCE
OUTPATIENT
Start: 2024-11-22

## 2024-11-14 RX ORDER — DEXTROSE MONOHYDRATE 25 G/50ML
10-50 INJECTION, SOLUTION INTRAVENOUS
OUTPATIENT
Start: 2024-11-14

## 2024-11-14 RX ORDER — ACETAMINOPHEN 500 MG
1000 TABLET ORAL ONCE
OUTPATIENT
Start: 2024-11-22

## 2024-11-14 RX ORDER — PREGABALIN 25 MG/1
25 CAPSULE ORAL ONCE
OUTPATIENT
Start: 2024-11-22

## 2024-11-14 RX ORDER — IBUPROFEN 600 MG/1
1 TABLET ORAL
OUTPATIENT
Start: 2024-11-14

## 2024-11-14 RX ORDER — SODIUM CHLORIDE 9 MG/ML
30 INJECTION, SOLUTION INTRAVENOUS CONTINUOUS PRN
OUTPATIENT
Start: 2024-11-14 | End: 2024-11-15

## 2024-11-14 NOTE — TELEPHONE ENCOUNTER
Spoke with pt and advised of the below information. She is agreeable with 11/22 OR date and Dr Downing covering her care in Dr Melgar's absence. I advised we would get everything scheduled and I would give her a call with further instructions. She voiced understanding.

## 2024-11-14 NOTE — TELEPHONE ENCOUNTER
Patient scheduled for CTA Chest on 11/20. Patient aware of appt date/time and instructions. Per Gabby Slaughter APRN - poss OR date of either 11/22 or 11/29. Patient is agreeable with either date. I did inform pt if it is on 11/22 we can get her prework the same day as CTA to save a trip to [place]. She voiced understanding to all.

## 2024-11-14 NOTE — TELEPHONE ENCOUNTER
Attempted to reach patient to further discuss surgery dates.  Dr. Melgar would be able to do her bypass surgery on November 22, however he would be off call the weekend of the 23rd and 24th and then be out of town November 25 and 26.  His partner Dr. Downing would cover her care in his absence.  Or she could proceed with surgery on November 29 and Dr. Melgar would be on-call over the weekend of the 30th and December 1.  No answer.  Please inform her of the above and which surgery dates she would like to proceed with.  I will place orders once we know which date works for her.

## 2024-11-15 NOTE — TELEPHONE ENCOUNTER
Patient aware of prework date/time and OR date/arrival time 0500/npo/no meds. Aware to not take Ozempic on 11/17 as she normally would and to  Bactroban for use on 11/21 @ 1700 - instructions given.    Patient uses [facility]

## 2024-11-20 ENCOUNTER — HOSPITAL ENCOUNTER (OUTPATIENT)
Dept: CT IMAGING | Facility: HOSPITAL | Age: 64
Discharge: HOME | End: 2024-11-20
Payer: COMMERCIAL

## 2024-11-20 ENCOUNTER — TELEPHONE (OUTPATIENT)
Dept: CARDIAC SURGERY | Facility: CLINIC | Age: 64
End: 2024-11-20

## 2024-11-20 ENCOUNTER — PRE-ADMISSION TESTING (OUTPATIENT)
Dept: PREADMISSION TESTING | Facility: HOSPITAL | Age: 64
End: 2024-11-20
Payer: COMMERCIAL

## 2024-11-20 ENCOUNTER — ANESTHESIA EVENT (OUTPATIENT)
Dept: PERIOP | Facility: HOSPITAL | Age: 64
End: 2024-11-20
Payer: COMMERCIAL

## 2024-11-20 ENCOUNTER — HOSPITAL ENCOUNTER (OUTPATIENT)
Dept: GENERAL RADIOLOGY | Facility: HOSPITAL | Age: 64
Discharge: HOME | End: 2024-11-20
Payer: COMMERCIAL

## 2024-11-20 VITALS
HEART RATE: 74 BPM | WEIGHT: 176.59 LBS | HEIGHT: 64 IN | OXYGEN SATURATION: 96 % | RESPIRATION RATE: 18 BRPM | DIASTOLIC BLOOD PRESSURE: 59 MMHG | BODY MASS INDEX: 30.15 KG/M2 | SYSTOLIC BLOOD PRESSURE: 116 MMHG

## 2024-11-20 DIAGNOSIS — I25.118 CORONARY ARTERY DISEASE OF NATIVE ARTERY OF NATIVE HEART WITH STABLE ANGINA PECTORIS: ICD-10-CM

## 2024-11-20 LAB
ABO GROUP BLD: NORMAL
ALBUMIN SERPL-MCNC: 4.1 G/DL (ref 3.5–5.2)
ALBUMIN/GLOB SERPL: 1.7 G/DL
ALP SERPL-CCNC: 82 U/L (ref 39–117)
ALT SERPL W P-5'-P-CCNC: 12 U/L (ref 1–33)
ANION GAP SERPL CALCULATED.3IONS-SCNC: 13 MMOL/L (ref 5–15)
APTT PPP: 25.8 SECONDS (ref 24.5–36)
AST SERPL-CCNC: 15 U/L (ref 1–32)
BASOPHILS # BLD AUTO: 0.08 10*3/MM3 (ref 0–0.2)
BASOPHILS NFR BLD AUTO: 1 % (ref 0–1.5)
BILIRUB SERPL-MCNC: 0.2 MG/DL (ref 0–1.2)
BILIRUB UR QL STRIP: NEGATIVE
BLD GP AB SCN SERPL QL: NEGATIVE
BUN SERPL-MCNC: 14 MG/DL (ref 8–23)
BUN/CREAT SERPL: 18.9 (ref 7–25)
CALCIUM SPEC-SCNC: 9.3 MG/DL (ref 8.6–10.5)
CHLORIDE SERPL-SCNC: 91 MMOL/L (ref 98–107)
CLARITY UR: CLEAR
CO2 SERPL-SCNC: 23 MMOL/L (ref 22–29)
COLOR UR: YELLOW
CREAT SERPL-MCNC: 0.74 MG/DL (ref 0.57–1)
DEPRECATED RDW RBC AUTO: 42.2 FL (ref 37–54)
EGFRCR SERPLBLD CKD-EPI 2021: 90.5 ML/MIN/1.73
EOSINOPHIL # BLD AUTO: 0.24 10*3/MM3 (ref 0–0.4)
EOSINOPHIL NFR BLD AUTO: 2.9 % (ref 0.3–6.2)
ERYTHROCYTE [DISTWIDTH] IN BLOOD BY AUTOMATED COUNT: 12.8 % (ref 12.3–15.4)
GLOBULIN UR ELPH-MCNC: 2.4 GM/DL
GLUCOSE BLDC GLUCOMTR-MCNC: 43 MG/DL (ref 70–130)
GLUCOSE BLDC GLUCOMTR-MCNC: 76 MG/DL (ref 70–130)
GLUCOSE SERPL-MCNC: 65 MG/DL (ref 65–99)
GLUCOSE UR STRIP-MCNC: NEGATIVE MG/DL
HCT VFR BLD AUTO: 36.2 % (ref 34–46.6)
HGB BLD-MCNC: 12.5 G/DL (ref 12–15.9)
HGB UR QL STRIP.AUTO: NEGATIVE
IMM GRANULOCYTES # BLD AUTO: 0.12 10*3/MM3 (ref 0–0.05)
IMM GRANULOCYTES NFR BLD AUTO: 1.4 % (ref 0–0.5)
INR PPP: 0.89 (ref 0.91–1.09)
KETONES UR QL STRIP: NEGATIVE
LEUKOCYTE ESTERASE UR QL STRIP.AUTO: NEGATIVE
LYMPHOCYTES # BLD AUTO: 2.32 10*3/MM3 (ref 0.7–3.1)
LYMPHOCYTES NFR BLD AUTO: 27.8 % (ref 19.6–45.3)
MCH RBC QN AUTO: 30.8 PG (ref 26.6–33)
MCHC RBC AUTO-ENTMCNC: 34.5 G/DL (ref 31.5–35.7)
MCV RBC AUTO: 89.2 FL (ref 79–97)
MONOCYTES # BLD AUTO: 0.67 10*3/MM3 (ref 0.1–0.9)
MONOCYTES NFR BLD AUTO: 8 % (ref 5–12)
NEUTROPHILS NFR BLD AUTO: 4.93 10*3/MM3 (ref 1.7–7)
NEUTROPHILS NFR BLD AUTO: 58.9 % (ref 42.7–76)
NITRITE UR QL STRIP: NEGATIVE
NRBC BLD AUTO-RTO: 0 /100 WBC (ref 0–0.2)
PH UR STRIP.AUTO: 5.5 [PH] (ref 5–8)
PLATELET # BLD AUTO: 227 10*3/MM3 (ref 140–450)
PMV BLD AUTO: 9.3 FL (ref 6–12)
POTASSIUM SERPL-SCNC: 4.2 MMOL/L (ref 3.5–5.2)
PROT SERPL-MCNC: 6.5 G/DL (ref 6–8.5)
PROT UR QL STRIP: NEGATIVE
PROTHROMBIN TIME: 12.4 SECONDS (ref 11.8–14.8)
RBC # BLD AUTO: 4.06 10*6/MM3 (ref 3.77–5.28)
RH BLD: POSITIVE
SODIUM SERPL-SCNC: 127 MMOL/L (ref 136–145)
SP GR UR STRIP: 1.01 (ref 1–1.03)
T&S EXPIRATION DATE: NORMAL
UROBILINOGEN UR QL STRIP: NORMAL
WBC NRBC COR # BLD AUTO: 8.36 10*3/MM3 (ref 3.4–10.8)

## 2024-11-20 PROCEDURE — 25510000001 IOPAMIDOL PER 1 ML: Performed by: NURSE PRACTITIONER

## 2024-11-20 PROCEDURE — 71046 X-RAY EXAM CHEST 2 VIEWS: CPT

## 2024-11-20 PROCEDURE — 86900 BLOOD TYPING SEROLOGIC ABO: CPT

## 2024-11-20 PROCEDURE — 86901 BLOOD TYPING SEROLOGIC RH(D): CPT

## 2024-11-20 PROCEDURE — 82948 REAGENT STRIP/BLOOD GLUCOSE: CPT | Performed by: FAMILY MEDICINE

## 2024-11-20 PROCEDURE — 85610 PROTHROMBIN TIME: CPT

## 2024-11-20 PROCEDURE — 71275 CT ANGIOGRAPHY CHEST: CPT

## 2024-11-20 PROCEDURE — 85025 COMPLETE CBC W/AUTO DIFF WBC: CPT

## 2024-11-20 PROCEDURE — 81003 URINALYSIS AUTO W/O SCOPE: CPT

## 2024-11-20 PROCEDURE — 85730 THROMBOPLASTIN TIME PARTIAL: CPT

## 2024-11-20 PROCEDURE — 80053 COMPREHEN METABOLIC PANEL: CPT

## 2024-11-20 PROCEDURE — 93005 ELECTROCARDIOGRAM TRACING: CPT

## 2024-11-20 PROCEDURE — 86850 RBC ANTIBODY SCREEN: CPT

## 2024-11-20 PROCEDURE — 36415 COLL VENOUS BLD VENIPUNCTURE: CPT

## 2024-11-20 RX ORDER — IOPAMIDOL 755 MG/ML
100 INJECTION, SOLUTION INTRAVASCULAR
Status: COMPLETED | OUTPATIENT
Start: 2024-11-20 | End: 2024-11-20

## 2024-11-20 RX ADMIN — IOPAMIDOL 100 ML: 755 INJECTION, SOLUTION INTRAVENOUS at 15:00

## 2024-11-20 NOTE — TELEPHONE ENCOUNTER
Attempted to reach patient but no answer.  Waited a few minutes and called back I was able to reach her.  She does verbalize understanding of her surgery being canceled on Friday.  She has made an appointment with Dr. Buenrostro Friday afternoon at 145.  She does report a history of hyponatremia but she is unsure how low her sodium levels typically run.  Advised to continue to monitor cardiac symptoms and let us know if she develops chest pain or worsening shortness of breath in the interim.  She verbalizes understanding and appreciates the call.  Will follow-up after she is seen by PCP on Friday.

## 2024-11-20 NOTE — ANESTHESIA PREPROCEDURE EVALUATION
Anesthesia Evaluation     Patient summary reviewed   no history of anesthetic complications:   NPO Solid Status: > 8 hours             Airway   Mallampati: I  TM distance: >3 FB  Neck ROM: full  No difficulty expected  Dental          Pulmonary    (+) a smoker Former,sleep apnea on CPAP  (-) asthma  Cardiovascular   Exercise tolerance: poor (<4 METS)    (+) hypertension, CAD, angina, hyperlipidemia    ROS comment: Cardiac cath:  Conclusion 10/23/2024     Normal left main coronary artery  Proximal left anterior descending coronary artery has heavy atherosclerotic burden with eccentric 70% stenosis  Mid left anterior descending coronary artery has 70% stenosis  Ostium of first diagonal branch has 70% stenosis  Second diagonal branch does not have any significant obstructive disease  Mid left circumflex coronary artery has 70% stenosis  Second obtuse marginal branch ostium has 70% stenosis  Right coronary artery is large and dominant  Mid right coronary artery has 70% calcified stenosis with heavy atherosclerotic burden  Bilateral subclavian and bilateral internal mammary's are patent that can be used as surgical conduit     Summary     Multivessel coronary artery disease as described above    Echo 11/25/24    Left ventricular ejection fraction appears to be 66 - 70%.  ·  Left ventricular diastolic function was normal.  ·  Estimated right ventricular systolic pressure from tricuspid regurgitation is normal (<35 mmHg).  ·  Normal global longitudinal LV strain (GLS) = -17.2%           Neuro/Psych  (-) seizures, TIA, CVA  GI/Hepatic/Renal/Endo    (+) GERD, renal disease- stones, diabetes mellitus using insulin  (-) liver disease    Musculoskeletal     Abdominal    Substance History      OB/GYN          Other        ROS/Med Hx Other: hyponatremia              Anesthesia Plan    ASA 4     general, [place], CVL and PAC     (On exam the patient was lethargic, diaphoretic and slow to respond. She said she felt like she was  trembling prior to my discussion of health history. She endorsed that she was a diabetic on insulin and has been NPO. Asked RN to get emergent FSBG and that showed BG 43. Patient had no access, so I grabbed a Sprite to quickly drink. Repeat BG is 69 and patient symptoms began to improve.    On review of labs, it was noted that patient sodium was 127. Given both of these findings I called and spoke with Dr. Melgar regarding my concerns of hyponatremia and reported increased frequency of severe hypoglycemia by the  at bedside. Dr. Melgar agrees that case should be cancelled to allow for optimization. I have instructed patient and spouse to reach out to Dr. Bonilla to alert of these issues and that Dr. Melgar office will be discussing with Dr. Bonilla as well and will be reaching out to patient regarding next steps.   Dr Donna Jose    1/10/24  Pt returns for preop visit, she has been taken off Hctz and is eating more dietary salt with improvement in sodium levels. She reports blood sugar has been stable.   No c/I to [facility]   Dr Agustina Dasilva)    Anesthetic plan, risks, benefits, and alternatives have been provided, discussed and informed consent has been obtained with: patient and spouse/significant other.    Use of blood products discussed with patient  Consented to blood products.        CODE STATUS:

## 2024-11-20 NOTE — TELEPHONE ENCOUNTER
Reviewed preop labs including Na 127.  Discussed with Dr. Melgar.  Please cancel her surgery and place in [place].  I have called Dr. Buenrostro office and left a message with his nurse relying the above concern as well as report of hypoglycemic episodes as Dr. Melgar was just contacted by Dr. Donna Jose of the anesthesiology services given concerns at prework today.  He advised Dr. Orr that her case would be canceled but someone from our office should call her later today to relay the above as well.  Dr. Buenrostro will reach out to Dr. Melgar later today to discuss further.

## 2024-11-20 NOTE — DISCHARGE INSTRUCTIONS
Preparing for Surgery  Follow these instructions before the procedure:  Several days or weeks before your procedure  Medication(s) you need to stop ONE WEEK prior to surgery: OZEMPIC      Ask your health care provider about:  Changing or stopping your regular medicines. This is especially important if you are taking diabetes medicines or blood thinners.  Taking medicines such as aspirin and ibuprofen. These medicines can thin your blood. Do not take these medicines unless your health care provider tells you to take them.  Taking over-the-counter medicines, vitamins, herbs, and supplements.    Contact your surgeon if you:  Develop a fever of more than 100.4°F (38°C) or other feelings of illness during the 48 hours before your surgery.  Have symptoms that get worse.  Have questions or concerns about your surgery.  If you are going home the same day of your surgery you will need to arrange for a responsible adult, age 18 years old or older, to drive you home from the hospital and stay with you for 24 hours. Verification of the  will be made prior to any procedure requiring sedation. You may not go home in a taxi or any form of public transportation by yourself.     Day before your procedure  Medication(s) you need to stop the day before your surgery: DO NOT TAKE LISINOPRIL FOR 24 HOURS PRIOR TO SURGERY    24 hours before your procedure DO NOT drink alcoholic beverages or smoke.  24 hours before your procedure STOP taking Erectile Dysfunction medication (i.e.,Cialis, Viagra)   You may be asked to shower with a germ-killing soap.  Day of your procedure   You may take the following medication(s) the morning of surgery with a sip of water: NONE PER DR WALKER ORDERS      8 hours before your scheduled arrival time, STOP all food, any dairy products, and full liquids. This includes hard candy, chewing gum or mints. This is extremely important to prevent serious complications.     Up to 2 hours before your scheduled  arrival time, you may have clear liquids no cream, powder, or pulp of any kind. Safe options are water, black coffee, plain tea, soda, Gatorade/Powerade, clear broth, apple juice.    2 hours before your scheduled arrival time, STOP drinking clear liquids.    You may need to take another shower with a germ-killing soap before you leave home in the morning. Do not use perfumes, colognes, or body lotions.  Wear comfortable loose-fitting clothing.  Remove all jewelry including body piercing and rings, dark colored nail polish, and make up prior to arrival at the hospital. Leave all valuables at home.   Bring your hearing aids if you rely on them.  Do not wear contact lenses. If you wear eyeglasses remember to bring a case to store them in while you are in surgery.  Do not use denture adhesives since you will be asked to remove them during your surgery.    You do not need to bring your home medications into the hospital.   Bring your sleep apnea device with you on the day of your surgery (if this applies to you).  If you have an Inspire implant for sleep apnea, please bring the remote with you on the day of surgery.  If you wear portable oxygen, bring it with you.   If you are staying overnight, you may bring a bag of items you may need such as slippers, robe and a change of clothes for your discharge. You may want to leave these items in the car until you are ready for them since your family will take your belongings when you leave the pre-operative area.  Arrive at the hospital as scheduled by the office. You will be asked to arrive 2 hours prior to your surgery time in order to prepare for your procedure.  When you arrive at the hospital  Go to the registration desk located at the main entrance of the hospital.  After registration is completed, you will be given a beeper and a sticker sheet. Take the stickers to Outpatient Surgery and place in the tray at the end of the desk to notify the staff that you have arrived  and registered.   Return to the lobby to wait. You are not always called back according to the time of arrival but rather the time your doctor will be ready.  When your beeper lights up and vibrates proceed through the double doors, under the stairs, and a member of the Outpatient Surgery staff will escort you to your preoperative room.     How to Use Chlorhexidine Before Surgery  Chlorhexidine gluconate (CHG) is a germ-killing (antiseptic) solution that is used to clean the skin. It can get rid of the bacteria that normally live on the skin and can keep them away for about 24 hours. To clean your skin with CHG, you may be given:  A CHG solution to use in the shower or as part of a sponge bath.  A prepackaged cloth that contains CHG.  Cleaning your skin with CHG may help lower the risk for infection:  While you are staying in the intensive care unit of the hospital.  If you have a vascular access, such as a central line, to provide short-term or long-term access to your veins.  If you have a catheter to drain urine from your bladder.  If you are on a ventilator. A ventilator is a machine that helps you breathe by moving air in and out of your lungs.  After surgery.  What are the risks?  Risks of using CHG include:  A skin reaction.  Hearing loss, if CHG gets in your ears and you have a perforated eardrum.  Eye injury, if CHG gets in your eyes and is not rinsed out.  The CHG product catching fire.  Make sure that you avoid smoking and flames after applying CHG to your skin.  Do not use CHG:  If you have a chlorhexidine allergy or have previously reacted to chlorhexidine.  On babies younger than 2 months of age.  How to use CHG solution  Use CHG only as told by your health care provider, and follow the instructions on the label.  Use the full amount of CHG as directed. Usually, this is one bottle.  During a shower    Follow these steps when using CHG solution during a shower (unless your health care provider gives you  different instructions):  Start the shower.  Use your normal soap and shampoo to wash your face and hair.  Turn off the shower or move out of the shower stream.  Pour the CHG onto a clean washcloth. Do not use any type of brush or rough-edged sponge.  Starting at your neck, lather your body down to your toes. Make sure you follow these instructions:  If you will be having surgery, pay special attention to the part of your body where you will be having surgery. Scrub this area for at least 1 minute.  Do not use CHG on your head or face. If the solution gets into your ears or eyes, rinse them well with water.  Avoid your genital area.  Avoid any areas of skin that have broken skin, cuts, or scrapes.  Scrub your back and under your arms. Make sure to wash skin folds.  Let the lather sit on your skin for 1-2 minutes or as long as told by your health care provider.  Thoroughly rinse your entire body in the shower. Make sure that all body creases and crevices are rinsed well.  Dry off with a clean towel. Do not put any substances on your body afterward--such as powder, lotion, or perfume--unless you are told to do so by your health care provider. Only use lotions that are recommended by the .  Put on clean clothes or pajamas.  If it is the night before your surgery, sleep in clean sheets.     During a sponge bath  Follow these steps when using CHG solution during a sponge bath (unless your health care provider gives you different instructions):  Use your normal soap and shampoo to wash your face and hair.  Pour the CHG onto a clean washcloth.  Starting at your neck, lather your body down to your toes. Make sure you follow these instructions:  If you will be having surgery, pay special attention to the part of your body where you will be having surgery. Scrub this area for at least 1 minute.  Do not use CHG on your head or face. If the solution gets into your ears or eyes, rinse them well with water.  Avoid  your genital area.  Avoid any areas of skin that have broken skin, cuts, or scrapes.  Scrub your back and under your arms. Make sure to wash skin folds.  Let the lather sit on your skin for 1-2 minutes or as long as told by your health care provider.  Using a different clean, wet washcloth, thoroughly rinse your entire body. Make sure that all body creases and crevices are rinsed well.  Dry off with a clean towel. Do not put any substances on your body afterward--such as powder, lotion, or perfume--unless you are told to do so by your health care provider. Only use lotions that are recommended by the .  Put on clean clothes or pajamas.  If it is the night before your surgery, sleep in clean sheets.  How to use CHG prepackaged cloths  Only use CHG cloths as told by your health care provider, and follow the instructions on the label.  Use the CHG cloth on clean, dry skin.  Do not use the CHG cloth on your head or face unless your health care provider tells you to.  When washing with the CHG cloth:  Avoid your genital area.  Avoid any areas of skin that have broken skin, cuts, or scrapes.  Before surgery    Follow these steps when using a CHG cloth to clean before surgery (unless your health care provider gives you different instructions):  Using the CHG cloth, vigorously scrub the part of your body where you will be having surgery. Scrub using a back-and-forth motion for 3 minutes. The area on your body should be completely wet with CHG when you are done scrubbing.  Do not rinse. Discard the cloth and let the area air-dry. Do not put any substances on the area afterward, such as powder, lotion, or perfume.  Put on clean clothes or pajamas.  If it is the night before your surgery, sleep in clean sheets.     For general bathing  Follow these steps when using CHG cloths for general bathing (unless your health care provider gives you different instructions).  Use a separate CHG cloth for each area of your  body. Make sure you wash between any folds of skin and between your fingers and toes. Wash your body in the following order, switching to a new cloth after each step:  The front of your neck, shoulders, and chest.  Both of your arms, under your arms, and your hands.  Your stomach and groin area, avoiding the genitals.  Your right leg and foot.  Your left leg and foot.  The back of your neck, your back, and your buttocks.  Do not rinse. Discard the cloth and let the area air-dry. Do not put any substances on your body afterward--such as powder, lotion, or perfume--unless you are told to do so by your health care provider. Only use lotions that are recommended by the .  Put on clean clothes or pajamas.  Contact a health care provider if:  Your skin gets irritated after scrubbing.  You have questions about using your solution or cloth.  You swallow any chlorhexidine. Call your local poison control center (1-466.758.8967 in the [place]  Get help right away if:  Your eyes itch badly, or they become very red or swollen.  Your skin itches badly and is red or swollen.  Your hearing changes.  You have trouble seeing.  You have swelling or tingling in your mouth or throat.  You have trouble breathing.  These symptoms may represent a serious problem that is an emergency. Do not wait to see if the symptoms will go away. Get medical help right away. Call your local emergency services (781 in the [place] Do not drive yourself to the hospital.  Summary  Chlorhexidine gluconate (CHG) is a germ-killing (antiseptic) solution that is used to clean the skin. Cleaning your skin with CHG may help to lower your risk for infection.  You may be given CHG to use for bathing. It may be in a bottle or in a prepackaged cloth to use on your skin. Carefully follow your health care provider's instructions and the instructions on the product label.  Do not use CHG if you have a chlorhexidine allergy.  Contact your health care provider if  your skin gets irritated after scrubbing.  This information is not intended to replace advice given to you by your health care provider. Make sure you discuss any questions you have with your health care provider.  Document Revised: 04/17/2023 Document Reviewed: 02/28/2022  Elsevier Patient Education © 2023 [employer]

## 2024-11-21 DIAGNOSIS — E27.8 ADRENAL MASS: Primary | ICD-10-CM

## 2024-11-21 DIAGNOSIS — I25.118 CORONARY ARTERY DISEASE OF NATIVE ARTERY OF NATIVE HEART WITH STABLE ANGINA PECTORIS: ICD-10-CM

## 2024-11-21 LAB
GLUCOSE BLDC GLUCOMTR-MCNC: 69 MG/DL (ref 70–130)
QT INTERVAL: 406 MS
QTC INTERVAL: 425 MS

## 2024-11-22 ENCOUNTER — ANESTHESIA (OUTPATIENT)
Dept: PERIOP | Facility: HOSPITAL | Age: 64
End: 2024-11-22
Payer: COMMERCIAL
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
## 2024-11-25 LAB
BH CV ECHO LEFT VENTRICLE GLOBAL LONGITUDINAL STRAIN: -17.2 %
BH CV ECHO MEAS - AO MAX PG: 6.6 MMHG
BH CV ECHO MEAS - AO MEAN PG: 3 MMHG
BH CV ECHO MEAS - AO ROOT DIAM: 2.5 CM
BH CV ECHO MEAS - AO V2 MAX: 128 CM/SEC
BH CV ECHO MEAS - AO V2 VTI: 26.9 CM
BH CV ECHO MEAS - AVA(I,D): 2.28 CM2
BH CV ECHO MEAS - EDV(CUBED): 68.9 ML
BH CV ECHO MEAS - EDV(MOD-SP2): 48.8 ML
BH CV ECHO MEAS - EDV(MOD-SP4): 45.4 ML
BH CV ECHO MEAS - EF(MOD-BP): 71.5 %
BH CV ECHO MEAS - EF(MOD-SP2): 75.6 %
BH CV ECHO MEAS - EF(MOD-SP4): 68.7 %
BH CV ECHO MEAS - ESV(CUBED): 10.6 ML
BH CV ECHO MEAS - ESV(MOD-SP2): 11.9 ML
BH CV ECHO MEAS - ESV(MOD-SP4): 14.2 ML
BH CV ECHO MEAS - FS: 46.3 %
BH CV ECHO MEAS - IVS/LVPW: 0.91 CM
BH CV ECHO MEAS - IVSD: 1 CM
BH CV ECHO MEAS - LA DIMENSION: 3.3 CM
BH CV ECHO MEAS - LAT PEAK E' VEL: 9.7 CM/SEC
BH CV ECHO MEAS - LV DIASTOLIC VOL/BSA (35-75): 24.7 CM2
BH CV ECHO MEAS - LV MASS(C)D: 141.5 GRAMS
BH CV ECHO MEAS - LV MAX PG: 4 MMHG
BH CV ECHO MEAS - LV MEAN PG: 2 MMHG
BH CV ECHO MEAS - LV SYSTOLIC VOL/BSA (12-30): 7.7 CM2
BH CV ECHO MEAS - LV V1 MAX: 99.9 CM/SEC
BH CV ECHO MEAS - LV V1 VTI: 21.6 CM
BH CV ECHO MEAS - LVIDD: 4.1 CM
BH CV ECHO MEAS - LVIDS: 2.2 CM
BH CV ECHO MEAS - LVOT AREA: 2.8 CM2
BH CV ECHO MEAS - LVOT DIAM: 1.9 CM
BH CV ECHO MEAS - LVPWD: 1.1 CM
BH CV ECHO MEAS - MED PEAK E' VEL: 6.7 CM/SEC
BH CV ECHO MEAS - MV A MAX VEL: 63.8 CM/SEC
BH CV ECHO MEAS - MV DEC SLOPE: 265 CM/SEC2
BH CV ECHO MEAS - MV DEC TIME: 0.29 SEC
BH CV ECHO MEAS - MV E MAX VEL: 50.6 CM/SEC
BH CV ECHO MEAS - MV E/A: 0.79
BH CV ECHO MEAS - MV MAX PG: 3.1 MMHG
BH CV ECHO MEAS - MV MEAN PG: 1 MMHG
BH CV ECHO MEAS - MV P1/2T: 87 MSEC
BH CV ECHO MEAS - MV V2 VTI: 26.6 CM
BH CV ECHO MEAS - MVA(P1/2T): 2.5 CM2
BH CV ECHO MEAS - MVA(VTI): 2.3 CM2
BH CV ECHO MEAS - PA V2 MAX: 92.3 CM/SEC
BH CV ECHO MEAS - RAP SYSTOLE: 8 MMHG
BH CV ECHO MEAS - RVSP: 11.9 MMHG
BH CV ECHO MEAS - SV(LVOT): 61.2 ML
BH CV ECHO MEAS - SV(MOD-SP2): 36.9 ML
BH CV ECHO MEAS - SV(MOD-SP4): 31.2 ML
BH CV ECHO MEAS - SVI(LVOT): 33.4 ML/M2
BH CV ECHO MEAS - SVI(MOD-SP2): 20.1 ML/M2
BH CV ECHO MEAS - SVI(MOD-SP4): 17 ML/M2
BH CV ECHO MEAS - TAPSE (>1.6): 2.1 CM
BH CV ECHO MEAS - TR MAX PG: 3.9 MMHG
BH CV ECHO MEAS - TR MAX VEL: 99.1 CM/SEC
BH CV ECHO MEASUREMENTS AVERAGE E/E' RATIO: 6.17
BH CV XLRA - TDI S': 11.9 CM/SEC
LEFT ATRIUM VOLUME INDEX: 15.6 ML/M2
LEFT ATRIUM VOLUME: 28.6 ML

## 2024-12-12 ENCOUNTER — HOSPITAL ENCOUNTER (OUTPATIENT)
Dept: MRI IMAGING | Facility: HOSPITAL | Age: 64
Discharge: HOME | End: 2024-12-12
Admitting: NURSE PRACTITIONER
Payer: COMMERCIAL

## 2024-12-12 DIAGNOSIS — I25.118 CORONARY ARTERY DISEASE OF NATIVE ARTERY OF NATIVE HEART WITH STABLE ANGINA PECTORIS: ICD-10-CM

## 2024-12-12 PROCEDURE — 25510000001 GADOPICLENOL 0.5 MMOL/ML SOLUTION: Performed by: NURSE PRACTITIONER

## 2024-12-12 PROCEDURE — A9579 GAD-BASE MR CONTRAST NOS,1ML: HCPCS | Performed by: NURSE PRACTITIONER

## 2024-12-12 PROCEDURE — 74183 MRI ABD W/O CNTR FLWD CNTR: CPT

## 2024-12-12 RX ADMIN — GADOPICLENOL 7.5 ML: 485.1 INJECTION INTRAVENOUS at 09:03

## 2024-12-30 DIAGNOSIS — E87.1 HYPONATREMIA: ICD-10-CM

## 2024-12-30 DIAGNOSIS — I25.118 CORONARY ARTERY DISEASE OF NATIVE ARTERY OF NATIVE HEART WITH STABLE ANGINA PECTORIS: Primary | ICD-10-CM

## 2024-12-30 DIAGNOSIS — E11.65 TYPE 2 DIABETES MELLITUS WITH HYPERGLYCEMIA, UNSPECIFIED WHETHER LONG TERM INSULIN USE: ICD-10-CM

## 2025-01-07 DIAGNOSIS — I25.118 CORONARY ARTERY DISEASE OF NATIVE ARTERY OF NATIVE HEART WITH STABLE ANGINA PECTORIS: Primary | ICD-10-CM

## 2025-01-10 ENCOUNTER — PRE-ADMISSION TESTING (OUTPATIENT)
Dept: PREADMISSION TESTING | Facility: HOSPITAL | Age: 65
DRG: 236 | End: 2025-01-10
Payer: COMMERCIAL

## 2025-01-10 ENCOUNTER — HOSPITAL ENCOUNTER (OUTPATIENT)
Dept: GENERAL RADIOLOGY | Facility: HOSPITAL | Age: 65
Discharge: HOME | End: 2025-01-10
Payer: COMMERCIAL

## 2025-01-10 VITALS
SYSTOLIC BLOOD PRESSURE: 148 MMHG | WEIGHT: 182.98 LBS | OXYGEN SATURATION: 98 % | DIASTOLIC BLOOD PRESSURE: 62 MMHG | HEART RATE: 72 BPM | HEIGHT: 65 IN | BODY MASS INDEX: 30.49 KG/M2 | RESPIRATION RATE: 20 BRPM

## 2025-01-10 DIAGNOSIS — E11.65 TYPE 2 DIABETES MELLITUS WITH HYPERGLYCEMIA, UNSPECIFIED WHETHER LONG TERM INSULIN USE: ICD-10-CM

## 2025-01-10 DIAGNOSIS — I25.118 CORONARY ARTERY DISEASE OF NATIVE ARTERY OF NATIVE HEART WITH STABLE ANGINA PECTORIS: ICD-10-CM

## 2025-01-10 LAB
ABO GROUP BLD: NORMAL
ALBUMIN SERPL-MCNC: 4 G/DL (ref 3.5–5.2)
ALBUMIN/GLOB SERPL: 1.7 G/DL
ALP SERPL-CCNC: 84 U/L (ref 39–117)
ALT SERPL W P-5'-P-CCNC: 10 U/L (ref 1–33)
ANION GAP SERPL CALCULATED.3IONS-SCNC: 12 MMOL/L (ref 5–15)
APTT PPP: 24.6 SECONDS (ref 24.5–36)
AST SERPL-CCNC: 12 U/L (ref 1–32)
BASOPHILS # BLD AUTO: 0.03 10*3/MM3 (ref 0–0.2)
BASOPHILS NFR BLD AUTO: 0.4 % (ref 0–1.5)
BILIRUB SERPL-MCNC: 0.2 MG/DL (ref 0–1.2)
BLD GP AB SCN SERPL QL: NEGATIVE
BUN SERPL-MCNC: 10 MG/DL (ref 8–23)
BUN/CREAT SERPL: 11.6 (ref 7–25)
CALCIUM SPEC-SCNC: 9.3 MG/DL (ref 8.6–10.5)
CHLORIDE SERPL-SCNC: 101 MMOL/L (ref 98–107)
CO2 SERPL-SCNC: 24 MMOL/L (ref 22–29)
CREAT SERPL-MCNC: 0.86 MG/DL (ref 0.57–1)
DEPRECATED RDW RBC AUTO: 42.8 FL (ref 37–54)
EGFRCR SERPLBLD CKD-EPI 2021: 75.5 ML/MIN/1.73
EOSINOPHIL # BLD AUTO: 0.21 10*3/MM3 (ref 0–0.4)
EOSINOPHIL NFR BLD AUTO: 3.1 % (ref 0.3–6.2)
ERYTHROCYTE [DISTWIDTH] IN BLOOD BY AUTOMATED COUNT: 12.9 % (ref 12.3–15.4)
GLOBULIN UR ELPH-MCNC: 2.4 GM/DL
GLUCOSE SERPL-MCNC: 139 MG/DL (ref 65–99)
HBA1C MFR BLD: 7.3 % (ref 4.8–5.6)
HCT VFR BLD AUTO: 36.3 % (ref 34–46.6)
HGB BLD-MCNC: 12.3 G/DL (ref 12–15.9)
IMM GRANULOCYTES # BLD AUTO: 0.05 10*3/MM3 (ref 0–0.05)
IMM GRANULOCYTES NFR BLD AUTO: 0.7 % (ref 0–0.5)
INR PPP: 0.86 (ref 0.91–1.09)
LYMPHOCYTES # BLD AUTO: 1.86 10*3/MM3 (ref 0.7–3.1)
LYMPHOCYTES NFR BLD AUTO: 27.8 % (ref 19.6–45.3)
MCH RBC QN AUTO: 30.8 PG (ref 26.6–33)
MCHC RBC AUTO-ENTMCNC: 33.9 G/DL (ref 31.5–35.7)
MCV RBC AUTO: 91 FL (ref 79–97)
MONOCYTES # BLD AUTO: 0.33 10*3/MM3 (ref 0.1–0.9)
MONOCYTES NFR BLD AUTO: 4.9 % (ref 5–12)
NEUTROPHILS NFR BLD AUTO: 4.22 10*3/MM3 (ref 1.7–7)
NEUTROPHILS NFR BLD AUTO: 63.1 % (ref 42.7–76)
NRBC BLD AUTO-RTO: 0 /100 WBC (ref 0–0.2)
PLATELET # BLD AUTO: 180 10*3/MM3 (ref 140–450)
PMV BLD AUTO: 10.7 FL (ref 6–12)
POTASSIUM SERPL-SCNC: 4.3 MMOL/L (ref 3.5–5.2)
PROT SERPL-MCNC: 6.4 G/DL (ref 6–8.5)
PROTHROMBIN TIME: 12.2 SECONDS (ref 11.8–14.8)
RBC # BLD AUTO: 3.99 10*6/MM3 (ref 3.77–5.28)
RH BLD: POSITIVE
SODIUM SERPL-SCNC: 137 MMOL/L (ref 136–145)
T&S EXPIRATION DATE: NORMAL
WBC NRBC COR # BLD AUTO: 6.7 10*3/MM3 (ref 3.4–10.8)

## 2025-01-10 PROCEDURE — 85610 PROTHROMBIN TIME: CPT

## 2025-01-10 PROCEDURE — 93005 ELECTROCARDIOGRAM TRACING: CPT

## 2025-01-10 PROCEDURE — 85025 COMPLETE CBC W/AUTO DIFF WBC: CPT

## 2025-01-10 PROCEDURE — 85730 THROMBOPLASTIN TIME PARTIAL: CPT

## 2025-01-10 PROCEDURE — 80053 COMPREHEN METABOLIC PANEL: CPT

## 2025-01-10 PROCEDURE — 86901 BLOOD TYPING SEROLOGIC RH(D): CPT

## 2025-01-10 PROCEDURE — 71046 X-RAY EXAM CHEST 2 VIEWS: CPT

## 2025-01-10 PROCEDURE — 86900 BLOOD TYPING SEROLOGIC ABO: CPT

## 2025-01-10 PROCEDURE — 83036 HEMOGLOBIN GLYCOSYLATED A1C: CPT

## 2025-01-10 PROCEDURE — 36415 COLL VENOUS BLD VENIPUNCTURE: CPT

## 2025-01-10 PROCEDURE — 86850 RBC ANTIBODY SCREEN: CPT

## 2025-01-10 PROCEDURE — 93010 ELECTROCARDIOGRAM REPORT: CPT | Performed by: EMERGENCY MEDICINE

## 2025-01-10 RX ORDER — POTASSIUM CHLORIDE 1500 MG/1
20 TABLET, EXTENDED RELEASE ORAL DAILY
COMMUNITY
Start: 2024-12-16

## 2025-01-10 RX ORDER — LISINOPRIL 30 MG/1
1 TABLET ORAL DAILY
COMMUNITY
Start: 2024-12-23 | End: 2025-01-18 | Stop reason: HOSPADM

## 2025-01-10 NOTE — DISCHARGE INSTRUCTIONS
Preparing for Surgery  Follow these instructions before the procedure:  Several days or weeks before your procedure  Medication(s) you need to stop at least 1 week prior to surgery: Ozempic      Ask your health care provider about:  Changing or stopping your regular medicines. This is especially important if you are taking diabetes medicines or blood thinners.  Taking medicines such as aspirin and ibuprofen. These medicines can thin your blood. Do not take these medicines unless your health care provider tells you to take them.  Taking over-the-counter medicines, vitamins, herbs, and supplements.    Contact your surgeon if you:  Develop a fever of more than 100.4°F (38°C) or other feelings of illness during the 48 hours before your surgery.  Have symptoms that get worse.  Have questions or concerns about your surgery.  If you are going home the same day of your surgery you will need to arrange for a responsible adult, age 18 years old or older, to drive you home from the hospital and stay with you for 24 hours. Verification of the  will be made prior to any procedure requiring sedation. You may not go home in a taxi or any form of public transportation by yourself.     Day before your procedure  Medication(s) you need to stop the day before your surgery:  Lisinopril    24 hours before your procedure DO NOT drink alcoholic beverages or smoke.  24 hours before your procedure STOP taking Erectile Dysfunction medication (i.e.,Cialis, Viagra)   You may be asked to shower with a germ-killing soap.  Day of your procedure   You may take the following medication(s) the morning of surgery with a sip of water: none      8 hours before your scheduled arrival time, STOP all food, any dairy products, and full liquids. This includes hard candy, chewing gum or mints. This is extremely important to prevent serious complications.     Up to 2 hours before your scheduled arrival time, you may have clear liquids no cream, powder,  or pulp of any kind. Safe options are water, black coffee, plain tea, soda, Gatorade/Powerade, clear broth, apple juice.    2 hours before your scheduled arrival time, STOP drinking clear liquids.    You may need to take another shower with a germ-killing soap before you leave home in the morning. Do not use perfumes, colognes, or body lotions.  Wear comfortable loose-fitting clothing.  Remove all jewelry including body piercing and rings, dark colored nail polish, and make up prior to arrival at the hospital. Leave all valuables at home.   Bring your hearing aids if you rely on them.  Do not wear contact lenses. If you wear eyeglasses remember to bring a case to store them in while you are in surgery.  Do not use denture adhesives since you will be asked to remove them during your surgery.    You do not need to bring your home medications into the hospital.   Bring your sleep apnea device with you on the day of your surgery (if this applies to you).  If you have an Inspire implant for sleep apnea, please bring the remote with you on the day of surgery.  If you wear portable oxygen, bring it with you.   If you are staying overnight, you may bring a bag of items you may need such as slippers, robe and a change of clothes for your discharge. You may want to leave these items in the car until you are ready for them since your family will take your belongings when you leave the pre-operative area.  Arrive at the hospital as scheduled by the office. You will be asked to arrive 2 hours prior to your surgery time in order to prepare for your procedure.  When you arrive at the hospital  Go to the registration desk located at the main entrance of the hospital.  After registration is completed, you will be given a beeper and a sticker sheet. Take the stickers to Outpatient Surgery and place in the tray at the end of the desk to notify the staff that you have arrived and registered.   Return to the lobby to wait. You are not  always called back according to the time of arrival but rather the time your doctor will be ready.  When your beeper lights up and vibrates proceed through the double doors, under the stairs, and a member of the Outpatient Surgery staff will escort you to your preoperative room.

## 2025-01-12 LAB
QT INTERVAL: 432 MS
QTC INTERVAL: 452 MS

## 2025-01-13 ENCOUNTER — APPOINTMENT (OUTPATIENT)
Dept: GENERAL RADIOLOGY | Facility: HOSPITAL | Age: 65
DRG: 236 | End: 2025-01-13
Payer: COMMERCIAL

## 2025-01-13 ENCOUNTER — APPOINTMENT (OUTPATIENT)
Dept: CARDIOLOGY | Facility: HOSPITAL | Age: 65
DRG: 236 | End: 2025-01-13
Payer: COMMERCIAL

## 2025-01-13 ENCOUNTER — HOSPITAL ENCOUNTER (INPATIENT)
Facility: HOSPITAL | Age: 65
LOS: 5 days | Discharge: HOME | DRG: 236 | End: 2025-01-18
Attending: THORACIC SURGERY (CARDIOTHORACIC VASCULAR SURGERY) | Admitting: THORACIC SURGERY (CARDIOTHORACIC VASCULAR SURGERY)
Payer: COMMERCIAL

## 2025-01-13 DIAGNOSIS — Z74.09 IMPAIRED MOBILITY: Primary | ICD-10-CM

## 2025-01-13 DIAGNOSIS — I25.118 CORONARY ARTERY DISEASE OF NATIVE ARTERY OF NATIVE HEART WITH STABLE ANGINA PECTORIS: ICD-10-CM

## 2025-01-13 PROBLEM — I10 HTN (HYPERTENSION): Status: ACTIVE | Noted: 2025-01-13

## 2025-01-13 PROBLEM — E11.9 TYPE 2 DIABETES MELLITUS: Status: ACTIVE | Noted: 2025-01-13

## 2025-01-13 PROBLEM — I25.10 CAD (CORONARY ARTERY DISEASE): Status: ACTIVE | Noted: 2025-01-13

## 2025-01-13 PROBLEM — E78.2 MIXED HYPERLIPIDEMIA: Status: ACTIVE | Noted: 2025-01-13

## 2025-01-13 PROBLEM — G47.33 OSA (OBSTRUCTIVE SLEEP APNEA): Status: ACTIVE | Noted: 2025-01-13

## 2025-01-13 PROBLEM — Z87.891 FORMER SMOKER: Status: ACTIVE | Noted: 2025-01-13

## 2025-01-13 LAB
A-A DO2: ABNORMAL
ALBUMIN SERPL-MCNC: 3.8 G/DL (ref 3.5–5.2)
ALBUMIN SERPL-MCNC: 3.9 G/DL (ref 3.5–5.2)
ANION GAP SERPL CALCULATED.3IONS-SCNC: 8 MMOL/L (ref 5–15)
ANION GAP SERPL CALCULATED.3IONS-SCNC: 9 MMOL/L (ref 5–15)
APTT PPP: 30.6 SECONDS (ref 24.5–36)
ARTERIAL PATENCY WRIST A: ABNORMAL
ARTERIAL PATENCY WRIST A: POSITIVE
ATMOSPHERIC PRESS: 760 MMHG
ATMOSPHERIC PRESS: 761 MMHG
ATMOSPHERIC PRESS: 762 MMHG
ATMOSPHERIC PRESS: 762 MMHG
BASE EXCESS BLDA CALC-SCNC: -2.9 MMOL/L (ref 0–2)
BASE EXCESS BLDA CALC-SCNC: -3 MMOL/L (ref 0–2)
BASE EXCESS BLDA CALC-SCNC: -3.5 MMOL/L (ref 0–2)
BASE EXCESS BLDA CALC-SCNC: -3.5 MMOL/L (ref 0–2)
BASE EXCESS BLDA CALC-SCNC: -4 MMOL/L (ref 0–2)
BASE EXCESS BLDA CALC-SCNC: -4 MMOL/L (ref 0–2)
BASE EXCESS BLDA CALC-SCNC: -4.5 MMOL/L (ref 0–2)
BASE EXCESS BLDA CALC-SCNC: -4.9 MMOL/L (ref 0–2)
BASE EXCESS BLDA CALC-SCNC: -6.3 MMOL/L (ref 0–2)
BASE EXCESS BLDA CALC-SCNC: -6.6 MMOL/L (ref 0–2)
BDY SITE: ABNORMAL
BODY TEMPERATURE: 37
BUN SERPL-MCNC: 11 MG/DL (ref 8–23)
BUN SERPL-MCNC: 13 MG/DL (ref 8–23)
BUN/CREAT SERPL: 12.8 (ref 7–25)
BUN/CREAT SERPL: 15.3 (ref 7–25)
CA-I BLD-MCNC: 4.45 MG/DL (ref 4.6–5.4)
CA-I BLD-MCNC: 4.46 MG/DL (ref 4.6–5.4)
CA-I BLD-MCNC: 4.53 MG/DL (ref 4.6–5.4)
CA-I BLD-MCNC: 4.54 MG/DL (ref 4.6–5.4)
CA-I BLD-MCNC: 4.71 MG/DL (ref 4.6–5.4)
CA-I BLD-MCNC: 4.78 MG/DL (ref 4.6–5.4)
CA-I BLD-MCNC: 5.07 MG/DL (ref 4.6–5.4)
CA-I BLD-MCNC: 5.13 MG/DL (ref 4.6–5.4)
CALCIUM SPEC-SCNC: 8.5 MG/DL (ref 8.6–10.5)
CALCIUM SPEC-SCNC: 8.8 MG/DL (ref 8.6–10.5)
CHLORIDE SERPL-SCNC: 108 MMOL/L (ref 98–107)
CHLORIDE SERPL-SCNC: 108 MMOL/L (ref 98–107)
CO2 SERPL-SCNC: 21 MMOL/L (ref 22–29)
CO2 SERPL-SCNC: 22 MMOL/L (ref 22–29)
COHGB MFR BLD: 1.2 % (ref 0–5)
COHGB MFR BLD: 1.3 % (ref 0–5)
COHGB MFR BLD: 1.3 % (ref 0–5)
COHGB MFR BLD: 1.4 % (ref 0–5)
COHGB MFR BLD: 1.4 % (ref 0–5)
COHGB MFR BLD: 1.7 % (ref 0–5)
COHGB MFR BLD: 2.4 % (ref 0–5)
CPAP: ABNORMAL
CREAT SERPL-MCNC: 0.85 MG/DL (ref 0.57–1)
CREAT SERPL-MCNC: 0.86 MG/DL (ref 0.57–1)
DEPRECATED RDW RBC AUTO: 44.7 FL (ref 37–54)
DEPRECATED RDW RBC AUTO: 45.1 FL (ref 37–54)
EGFRCR SERPLBLD CKD-EPI 2021: 75.5 ML/MIN/1.73
EGFRCR SERPLBLD CKD-EPI 2021: 76.6 ML/MIN/1.73
EPAP: ABNORMAL
ERYTHROCYTE [DISTWIDTH] IN BLOOD BY AUTOMATED COUNT: 13.3 % (ref 12.3–15.4)
ERYTHROCYTE [DISTWIDTH] IN BLOOD BY AUTOMATED COUNT: 13.3 % (ref 12.3–15.4)
GAS FLOW AIRWAY: ABNORMAL L/MIN
GLUCOSE BLDA-MCNC: 156 MG/DL (ref 65–99)
GLUCOSE BLDA-MCNC: 168 MG/DL (ref 65–99)
GLUCOSE BLDA-MCNC: 175 MG/DL (ref 65–99)
GLUCOSE BLDA-MCNC: 222 MG/DL (ref 65–99)
GLUCOSE BLDA-MCNC: 226 MG/DL (ref 65–99)
GLUCOSE BLDA-MCNC: 251 MG/DL (ref 65–99)
GLUCOSE BLDA-MCNC: 267 MG/DL (ref 65–99)
GLUCOSE BLDC GLUCOMTR-MCNC: 124 MG/DL (ref 70–130)
GLUCOSE BLDC GLUCOMTR-MCNC: 136 MG/DL (ref 70–130)
GLUCOSE BLDC GLUCOMTR-MCNC: 150 MG/DL (ref 70–130)
GLUCOSE BLDC GLUCOMTR-MCNC: 167 MG/DL (ref 70–130)
GLUCOSE BLDC GLUCOMTR-MCNC: 180 MG/DL (ref 70–130)
GLUCOSE BLDC GLUCOMTR-MCNC: 194 MG/DL (ref 70–130)
GLUCOSE BLDC GLUCOMTR-MCNC: 198 MG/DL (ref 70–130)
GLUCOSE BLDC GLUCOMTR-MCNC: 208 MG/DL (ref 70–130)
GLUCOSE BLDC GLUCOMTR-MCNC: 210 MG/DL (ref 70–130)
GLUCOSE BLDC GLUCOMTR-MCNC: 221 MG/DL (ref 70–130)
GLUCOSE SERPL-MCNC: 152 MG/DL (ref 65–99)
GLUCOSE SERPL-MCNC: 161 MG/DL (ref 65–99)
HCO3 BLDA-SCNC: 21.1 MMOL/L (ref 20–26)
HCO3 BLDA-SCNC: 21.3 MMOL/L (ref 20–26)
HCO3 BLDA-SCNC: 21.7 MMOL/L (ref 20–26)
HCO3 BLDA-SCNC: 21.8 MMOL/L (ref 20–26)
HCO3 BLDA-SCNC: 22 MMOL/L (ref 20–26)
HCO3 BLDA-SCNC: 22.1 MMOL/L (ref 20–26)
HCO3 BLDA-SCNC: 22.2 MMOL/L (ref 20–26)
HCO3 BLDA-SCNC: 22.8 MMOL/L (ref 20–26)
HCO3 BLDA-SCNC: 23.1 MMOL/L (ref 20–26)
HCO3 BLDA-SCNC: 24.5 MMOL/L (ref 20–26)
HCT VFR BLD AUTO: 27.1 % (ref 34–46.6)
HCT VFR BLD AUTO: 28.4 % (ref 34–46.6)
HCT VFR BLD CALC: 27.5 % (ref 38–51)
HCT VFR BLD CALC: 28.4 % (ref 38–51)
HCT VFR BLD CALC: 28.8 % (ref 38–51)
HCT VFR BLD CALC: 29.5 % (ref 38–51)
HCT VFR BLD CALC: 29.9 % (ref 38–51)
HCT VFR BLD CALC: 32.8 % (ref 38–51)
HCT VFR BLD CALC: 34.7 % (ref 38–51)
HGB BLD-MCNC: 9.2 G/DL (ref 12–15.9)
HGB BLD-MCNC: 9.8 G/DL (ref 12–15.9)
HGB BLDA-MCNC: 10.7 G/DL (ref 12–16)
HGB BLDA-MCNC: 11.3 G/DL (ref 12–16)
HGB BLDA-MCNC: 9 G/DL (ref 12–16)
HGB BLDA-MCNC: 9.3 G/DL (ref 12–16)
HGB BLDA-MCNC: 9.4 G/DL (ref 12–16)
HGB BLDA-MCNC: 9.6 G/DL (ref 12–16)
HGB BLDA-MCNC: 9.8 G/DL (ref 12–16)
INHALED O2 CONCENTRATION: 100 %
INHALED O2 CONCENTRATION: 30 %
INHALED O2 CONCENTRATION: 60 %
INHALED O2 CONCENTRATION: 60 %
INR PPP: 1.27 (ref 0.91–1.09)
IPAP: ABNORMAL
LACTATE BLDA-SCNC: 1.1 MMOL/L (ref 0.5–2)
LACTATE BLDA-SCNC: 1.7 MMOL/L (ref 0.5–2)
LACTATE BLDA-SCNC: 2.4 MMOL/L (ref 0.5–2)
LACTATE BLDA-SCNC: 3.5 MMOL/L (ref 0.5–2)
LACTATE BLDA-SCNC: 3.5 MMOL/L (ref 0.5–2)
LACTATE BLDA-SCNC: 3.6 MMOL/L (ref 0.5–2)
LACTATE BLDA-SCNC: 3.9 MMOL/L (ref 0.5–2)
Lab: ABNORMAL
Lab: NORMAL
MCH RBC QN AUTO: 31.2 PG (ref 26.6–33)
MCH RBC QN AUTO: 31.6 PG (ref 26.6–33)
MCHC RBC AUTO-ENTMCNC: 33.9 G/DL (ref 31.5–35.7)
MCHC RBC AUTO-ENTMCNC: 34.5 G/DL (ref 31.5–35.7)
MCV RBC AUTO: 91.6 FL (ref 79–97)
MCV RBC AUTO: 91.9 FL (ref 79–97)
METHGB BLD QL: 0.8 % (ref 0–3)
METHGB BLD QL: 0.9 % (ref 0–3)
METHGB BLD QL: 1 % (ref 0–3)
METHGB BLD QL: 1.3 % (ref 0–3)
METHGB BLD QL: 1.6 % (ref 0–3)
MODALITY: ABNORMAL
NITRIC OXIDE: ABNORMAL
NOTE: ABNORMAL
NOTIFIED BY: ABNORMAL
NOTIFIED WHO: ABNORMAL
OXYHGB MFR BLDV: 93.2 % (ref 94–99)
OXYHGB MFR BLDV: 94.8 % (ref 94–99)
OXYHGB MFR BLDV: 97.4 % (ref 94–99)
OXYHGB MFR BLDV: 97.5 % (ref 94–99)
OXYHGB MFR BLDV: 97.8 % (ref 94–99)
OXYHGB MFR BLDV: 97.9 % (ref 94–99)
OXYHGB MFR BLDV: 98.4 % (ref 94–99)
PAW @ PEAK INSP FLOW SETTING VENT: ABNORMAL CM[H2O]
PCO2 BLDA: 35.9 MM HG (ref 35–45)
PCO2 BLDA: 40.8 MM HG (ref 35–45)
PCO2 BLDA: 41.8 MM HG (ref 35–45)
PCO2 BLDA: 42.1 MM HG (ref 35–45)
PCO2 BLDA: 43.6 MM HG (ref 35–45)
PCO2 BLDA: 44.7 MM HG (ref 35–45)
PCO2 BLDA: 48.2 MM HG (ref 35–45)
PCO2 BLDA: 53.4 MM HG (ref 35–45)
PCO2 BLDA: 57.1 MM HG (ref 35–45)
PCO2 BLDA: 64.4 MM HG (ref 35–45)
PCO2 TEMP ADJ BLD: 35.9 MM HG (ref 35–45)
PCO2 TEMP ADJ BLD: 40.8 MM HG (ref 35–45)
PCO2 TEMP ADJ BLD: 41.8 MM HG (ref 35–45)
PCO2 TEMP ADJ BLD: 42.1 MM HG (ref 35–45)
PCO2 TEMP ADJ BLD: 43.6 MM HG (ref 35–45)
PCO2 TEMP ADJ BLD: 44.7 MM HG (ref 35–45)
PCO2 TEMP ADJ BLD: 48.2 MM HG (ref 35–45)
PCO2 TEMP ADJ BLD: 53.4 MM HG (ref 35–45)
PCO2 TEMP ADJ BLD: 57.1 MM HG (ref 35–45)
PCO2 TEMP ADJ BLD: 64.4 MM HG (ref 35–45)
PEEP RESPIRATORY: 5 CM[H2O]
PEEP RESPIRATORY: 5 CM[H2O]
PEEP RESPIRATORY: 8 CM[H2O]
PEEP RESPIRATORY: ABNORMAL CM[H2O]
PH BLDA: 7.19 PH UNITS (ref 7.35–7.45)
PH BLDA: 7.19 PH UNITS (ref 7.35–7.45)
PH BLDA: 7.21 PH UNITS (ref 7.35–7.45)
PH BLDA: 7.29 PH UNITS (ref 7.35–7.45)
PH BLDA: 7.3 PH UNITS (ref 7.35–7.45)
PH BLDA: 7.31 PH UNITS (ref 7.35–7.45)
PH BLDA: 7.33 PH UNITS (ref 7.35–7.45)
PH BLDA: 7.33 PH UNITS (ref 7.35–7.45)
PH BLDA: 7.34 PH UNITS (ref 7.35–7.45)
PH BLDA: 7.39 PH UNITS (ref 7.35–7.45)
PH, TEMP CORRECTED: 7.19 PH UNITS (ref 7.35–7.45)
PH, TEMP CORRECTED: 7.19 PH UNITS (ref 7.35–7.45)
PH, TEMP CORRECTED: 7.21 PH UNITS (ref 7.35–7.45)
PH, TEMP CORRECTED: 7.29 PH UNITS (ref 7.35–7.45)
PH, TEMP CORRECTED: 7.3 PH UNITS (ref 7.35–7.45)
PH, TEMP CORRECTED: 7.31 PH UNITS (ref 7.35–7.45)
PH, TEMP CORRECTED: 7.33 PH UNITS (ref 7.35–7.45)
PH, TEMP CORRECTED: 7.33 PH UNITS (ref 7.35–7.45)
PH, TEMP CORRECTED: 7.34 PH UNITS (ref 7.35–7.45)
PH, TEMP CORRECTED: 7.39 PH UNITS (ref 7.35–7.45)
PHOSPHATE SERPL-MCNC: 2 MG/DL (ref 2.5–4.5)
PHOSPHATE SERPL-MCNC: 3.9 MG/DL (ref 2.5–4.5)
PLATELET # BLD AUTO: 121 10*3/MM3 (ref 140–450)
PLATELET # BLD AUTO: 127 10*3/MM3 (ref 140–450)
PMV BLD AUTO: 10.9 FL (ref 6–12)
PMV BLD AUTO: 11.2 FL (ref 6–12)
PO2 BLD: 124 MM[HG] (ref 0–500)
PO2 BLD: 175 MM[HG] (ref 0–500)
PO2 BLD: 245 MM[HG] (ref 0–500)
PO2 BLDA: 105 MM HG (ref 83–108)
PO2 BLDA: 73.5 MM HG (ref 83–108)
PO2 BLDA: 74.2 MM HG (ref 83–108)
PO2 BLDA: ABNORMAL MM[HG]
PO2 TEMP ADJ BLD: 103 MM HG (ref 83–108)
PO2 TEMP ADJ BLD: 105 MM HG (ref 83–108)
PO2 TEMP ADJ BLD: 389 MM HG (ref 83–108)
PO2 TEMP ADJ BLD: 396 MM HG (ref 83–108)
PO2 TEMP ADJ BLD: 491 MM HG (ref 83–108)
PO2 TEMP ADJ BLD: 73.5 MM HG (ref 83–108)
PO2 TEMP ADJ BLD: 74.2 MM HG (ref 83–108)
PO2 TEMP ADJ BLD: 94.9 MM HG (ref 83–108)
PO2 TEMP ADJ BLD: >547 MM HG (ref 83–108)
PO2 TEMP ADJ BLD: >547 MM HG (ref 83–108)
POTASSIUM BLDA-SCNC: 4 MMOL/L (ref 3.5–5.2)
POTASSIUM BLDA-SCNC: 4.4 MMOL/L (ref 3.5–5.2)
POTASSIUM BLDA-SCNC: 4.4 MMOL/L (ref 3.5–5.2)
POTASSIUM BLDA-SCNC: 4.5 MMOL/L (ref 3.5–5.2)
POTASSIUM BLDA-SCNC: 4.6 MMOL/L (ref 3.5–5.2)
POTASSIUM BLDA-SCNC: 4.6 MMOL/L (ref 3.5–5.2)
POTASSIUM BLDA-SCNC: 5.3 MMOL/L (ref 3.5–5.2)
POTASSIUM SERPL-SCNC: 4.3 MMOL/L (ref 3.5–5.2)
POTASSIUM SERPL-SCNC: 5.2 MMOL/L (ref 3.5–5.2)
PROTHROMBIN TIME: 16.6 SECONDS (ref 11.8–14.8)
PSV: 10 CMH2O
PSV: ABNORMAL
PULSE OX: ABNORMAL
RBC # BLD AUTO: 2.95 10*6/MM3 (ref 3.77–5.28)
RBC # BLD AUTO: 3.1 10*6/MM3 (ref 3.77–5.28)
SAO2 % BLDCOA: 100 % (ref 94–99)
SAO2 % BLDCOA: 93.7 % (ref 94–99)
SAO2 % BLDCOA: 95.5 % (ref 94–99)
SAO2 % BLDCOA: 96.1 % (ref 94–99)
SAO2 % BLDCOA: 97.2 % (ref 94–99)
SAO2 % BLDCOA: 98.3 % (ref 94–99)
SET MECH RESP RATE: 20
SET MECH RESP RATE: 20
SET MECH RESP RATE: ABNORMAL
SODIUM BLDA-SCNC: 135 MMOL/L (ref 136–145)
SODIUM BLDA-SCNC: 135 MMOL/L (ref 136–145)
SODIUM BLDA-SCNC: 136 MMOL/L (ref 136–145)
SODIUM BLDA-SCNC: 136 MMOL/L (ref 136–145)
SODIUM BLDA-SCNC: 137 MMOL/L (ref 136–145)
SODIUM BLDA-SCNC: 139 MMOL/L (ref 136–145)
SODIUM BLDA-SCNC: 141 MMOL/L (ref 136–145)
SODIUM SERPL-SCNC: 138 MMOL/L (ref 136–145)
SODIUM SERPL-SCNC: 138 MMOL/L (ref 136–145)
TOTAL RATE: ABNORMAL
VENTILATOR MODE: ABNORMAL
VT ON VENT VENT: 600 ML
VT ON VENT VENT: 600 ML
VT ON VENT VENT: ABNORMAL ML
WBC NRBC COR # BLD AUTO: 12.82 10*3/MM3 (ref 3.4–10.8)
WBC NRBC COR # BLD AUTO: 9.6 10*3/MM3 (ref 3.4–10.8)

## 2025-01-13 PROCEDURE — 25010000002 VANCOMYCIN 10 G RECONSTITUTED SOLUTION: Performed by: NURSE PRACTITIONER

## 2025-01-13 PROCEDURE — 82803 BLOOD GASES ANY COMBINATION: CPT

## 2025-01-13 PROCEDURE — 93005 ELECTROCARDIOGRAM TRACING: CPT | Performed by: THORACIC SURGERY (CARDIOTHORACIC VASCULAR SURGERY)

## 2025-01-13 PROCEDURE — 94799 UNLISTED PULMONARY SVC/PX: CPT

## 2025-01-13 PROCEDURE — 25010000002 METHYLPREDNISOLONE PER 125 MG: Performed by: THORACIC SURGERY (CARDIOTHORACIC VASCULAR SURGERY)

## 2025-01-13 PROCEDURE — 25010000002 METHYLENE BLUE 50 MG/10ML SOLUTION: Performed by: THORACIC SURGERY (CARDIOTHORACIC VASCULAR SURGERY)

## 2025-01-13 PROCEDURE — 82948 REAGENT STRIP/BLOOD GLUCOSE: CPT | Performed by: FAMILY MEDICINE

## 2025-01-13 PROCEDURE — 63710000001 INSULIN REGULAR HUMAN PER 5 UNITS: Performed by: NURSE ANESTHETIST, CERTIFIED REGISTERED

## 2025-01-13 PROCEDURE — 25010000002 VASOPRESSIN 20 UNIT/ML SOLUTION 1 ML VIAL: Performed by: NURSE ANESTHETIST, CERTIFIED REGISTERED

## 2025-01-13 PROCEDURE — 25010000003 DEXTROSE 5 % SOLUTION: Performed by: THORACIC SURGERY (CARDIOTHORACIC VASCULAR SURGERY)

## 2025-01-13 PROCEDURE — 25810000003 SODIUM CHLORIDE 0.9 % SOLUTION: Performed by: NURSE PRACTITIONER

## 2025-01-13 PROCEDURE — 25010000002 PROPOFOL 10 MG/ML EMULSION: Performed by: NURSE ANESTHETIST, CERTIFIED REGISTERED

## 2025-01-13 PROCEDURE — 3E080GC INTRODUCTION OF OTHER THERAPEUTIC SUBSTANCE INTO HEART, OPEN APPROACH: ICD-10-PCS | Performed by: THORACIC SURGERY (CARDIOTHORACIC VASCULAR SURGERY)

## 2025-01-13 PROCEDURE — C1713 ANCHOR/SCREW BN/BN,TIS/BN: HCPCS | Performed by: THORACIC SURGERY (CARDIOTHORACIC VASCULAR SURGERY)

## 2025-01-13 PROCEDURE — 94640 AIRWAY INHALATION TREATMENT: CPT

## 2025-01-13 PROCEDURE — 25010000002 CEFAZOLIN PER 500 MG: Performed by: NURSE ANESTHETIST, CERTIFIED REGISTERED

## 2025-01-13 PROCEDURE — 25010000002 MIDAZOLAM PER 1MG: Performed by: ANESTHESIOLOGY

## 2025-01-13 PROCEDURE — 25810000003 SODIUM CHLORIDE 0.9 % SOLUTION 250 ML FLEX CONT: Performed by: THORACIC SURGERY (CARDIOTHORACIC VASCULAR SURGERY)

## 2025-01-13 PROCEDURE — 25010000002 PROTAMINE SULFATE PER 10 MG: Performed by: NURSE ANESTHETIST, CERTIFIED REGISTERED

## 2025-01-13 PROCEDURE — 71045 X-RAY EXAM CHEST 1 VIEW: CPT

## 2025-01-13 PROCEDURE — 25010000002 HEPARIN (PORCINE) PER 1000 UNITS: Performed by: NURSE ANESTHETIST, CERTIFIED REGISTERED

## 2025-01-13 PROCEDURE — 02100Z9 BYPASS CORONARY ARTERY, ONE ARTERY FROM LEFT INTERNAL MAMMARY, OPEN APPROACH: ICD-10-PCS | Performed by: THORACIC SURGERY (CARDIOTHORACIC VASCULAR SURGERY)

## 2025-01-13 PROCEDURE — 25010000002 PHENYLEPHRINE 10 MG/ML SOLUTION: Performed by: NURSE ANESTHETIST, CERTIFIED REGISTERED

## 2025-01-13 PROCEDURE — 021109W BYPASS CORONARY ARTERY, TWO ARTERIES FROM AORTA WITH AUTOLOGOUS VENOUS TISSUE, OPEN APPROACH: ICD-10-PCS | Performed by: THORACIC SURGERY (CARDIOTHORACIC VASCULAR SURGERY)

## 2025-01-13 PROCEDURE — 93318 ECHO TRANSESOPHAGEAL INTRAOP: CPT | Performed by: NURSE ANESTHETIST, CERTIFIED REGISTERED

## 2025-01-13 PROCEDURE — 25810000003 SODIUM CHLORIDE 0.9 % SOLUTION: Performed by: THORACIC SURGERY (CARDIOTHORACIC VASCULAR SURGERY)

## 2025-01-13 PROCEDURE — 25010000002 VASOPRESSIN 20 UNIT/ML SOLUTION: Performed by: NURSE ANESTHETIST, CERTIFIED REGISTERED

## 2025-01-13 PROCEDURE — 25010000002 ALBUMIN HUMAN 5% PER 50 ML: Performed by: THORACIC SURGERY (CARDIOTHORACIC VASCULAR SURGERY)

## 2025-01-13 PROCEDURE — 25010000002 PAPAVERINE PER 60 MG: Performed by: THORACIC SURGERY (CARDIOTHORACIC VASCULAR SURGERY)

## 2025-01-13 PROCEDURE — 25810000003 SODIUM CHLORIDE 0.9 % SOLUTION 250 ML FLEX CONT: Performed by: NURSE ANESTHETIST, CERTIFIED REGISTERED

## 2025-01-13 PROCEDURE — 94002 VENT MGMT INPAT INIT DAY: CPT

## 2025-01-13 PROCEDURE — 93010 ELECTROCARDIOGRAM REPORT: CPT | Performed by: EMERGENCY MEDICINE

## 2025-01-13 PROCEDURE — 33518 CABG ARTERY-VEIN TWO: CPT | Performed by: THORACIC SURGERY (CARDIOTHORACIC VASCULAR SURGERY)

## 2025-01-13 PROCEDURE — 82948 REAGENT STRIP/BLOOD GLUCOSE: CPT

## 2025-01-13 PROCEDURE — 25010000002 ONDANSETRON PER 1 MG: Performed by: THORACIC SURGERY (CARDIOTHORACIC VASCULAR SURGERY)

## 2025-01-13 PROCEDURE — 85610 PROTHROMBIN TIME: CPT | Performed by: THORACIC SURGERY (CARDIOTHORACIC VASCULAR SURGERY)

## 2025-01-13 PROCEDURE — 86901 BLOOD TYPING SEROLOGIC RH(D): CPT

## 2025-01-13 PROCEDURE — 25010000002 ACETAMINOPHEN 10 MG/ML SOLUTION: Performed by: THORACIC SURGERY (CARDIOTHORACIC VASCULAR SURGERY)

## 2025-01-13 PROCEDURE — 25010000002 FENTANYL CITRATE (PF) 50 MCG/ML SOLUTION: Performed by: ANESTHESIOLOGY

## 2025-01-13 PROCEDURE — 94761 N-INVAS EAR/PLS OXIMETRY MLT: CPT

## 2025-01-13 PROCEDURE — 33533 CABG ARTERIAL SINGLE: CPT | Performed by: THORACIC SURGERY (CARDIOTHORACIC VASCULAR SURGERY)

## 2025-01-13 PROCEDURE — 25010000002 FENTANYL CITRATE (PF) 50 MCG/ML SOLUTION: Performed by: THORACIC SURGERY (CARDIOTHORACIC VASCULAR SURGERY)

## 2025-01-13 PROCEDURE — 86900 BLOOD TYPING SEROLOGIC ABO: CPT

## 2025-01-13 PROCEDURE — S0260 H&P FOR SURGERY: HCPCS | Performed by: THORACIC SURGERY (CARDIOTHORACIC VASCULAR SURGERY)

## 2025-01-13 PROCEDURE — 25010000002 LIDOCAINE PF 2% 2 % SOLUTION: Performed by: NURSE ANESTHETIST, CERTIFIED REGISTERED

## 2025-01-13 PROCEDURE — 25010000002 HEPARIN (PORCINE) PER 1000 UNITS: Performed by: THORACIC SURGERY (CARDIOTHORACIC VASCULAR SURGERY)

## 2025-01-13 PROCEDURE — 84132 ASSAY OF SERUM POTASSIUM: CPT

## 2025-01-13 PROCEDURE — 25810000003 LACTATED RINGERS SOLUTION: Performed by: THORACIC SURGERY (CARDIOTHORACIC VASCULAR SURGERY)

## 2025-01-13 PROCEDURE — 25810000003 LACTATED RINGERS PER 1000 ML: Performed by: THORACIC SURGERY (CARDIOTHORACIC VASCULAR SURGERY)

## 2025-01-13 PROCEDURE — 82375 ASSAY CARBOXYHB QUANT: CPT

## 2025-01-13 PROCEDURE — 85027 COMPLETE CBC AUTOMATED: CPT | Performed by: THORACIC SURGERY (CARDIOTHORACIC VASCULAR SURGERY)

## 2025-01-13 PROCEDURE — 25010000002 VANCOMYCIN 10 G RECONSTITUTED SOLUTION: Performed by: NURSE ANESTHETIST, CERTIFIED REGISTERED

## 2025-01-13 PROCEDURE — A4648 IMPLANTABLE TISSUE MARKER: HCPCS | Performed by: THORACIC SURGERY (CARDIOTHORACIC VASCULAR SURGERY)

## 2025-01-13 PROCEDURE — 25010000002 DIPHENHYDRAMINE PER 50 MG: Performed by: THORACIC SURGERY (CARDIOTHORACIC VASCULAR SURGERY)

## 2025-01-13 PROCEDURE — 5A1221Z PERFORMANCE OF CARDIAC OUTPUT, CONTINUOUS: ICD-10-PCS | Performed by: THORACIC SURGERY (CARDIOTHORACIC VASCULAR SURGERY)

## 2025-01-13 PROCEDURE — P9041 ALBUMIN (HUMAN),5%, 50ML: HCPCS | Performed by: THORACIC SURGERY (CARDIOTHORACIC VASCULAR SURGERY)

## 2025-01-13 PROCEDURE — 86923 COMPATIBILITY TEST ELECTRIC: CPT

## 2025-01-13 PROCEDURE — 25010000002 NICARDIPINE 2.5 MG/ML SOLUTION: Performed by: NURSE ANESTHETIST, CERTIFIED REGISTERED

## 2025-01-13 PROCEDURE — 83605 ASSAY OF LACTIC ACID: CPT

## 2025-01-13 PROCEDURE — 80069 RENAL FUNCTION PANEL: CPT | Performed by: THORACIC SURGERY (CARDIOTHORACIC VASCULAR SURGERY)

## 2025-01-13 PROCEDURE — 25010000002 EPINEPHRINE 1 MG/ML SOLUTION 30 ML VIAL: Performed by: THORACIC SURGERY (CARDIOTHORACIC VASCULAR SURGERY)

## 2025-01-13 PROCEDURE — 82947 ASSAY GLUCOSE BLOOD QUANT: CPT

## 2025-01-13 PROCEDURE — 82330 ASSAY OF CALCIUM: CPT

## 2025-01-13 PROCEDURE — C1751 CATH, INF, PER/CENT/MIDLINE: HCPCS | Performed by: NURSE ANESTHETIST, CERTIFIED REGISTERED

## 2025-01-13 PROCEDURE — 25010000002 VANCOMYCIN 1 G RECONSTITUTED SOLUTION 1 EACH VIAL: Performed by: THORACIC SURGERY (CARDIOTHORACIC VASCULAR SURGERY)

## 2025-01-13 PROCEDURE — 85018 HEMOGLOBIN: CPT

## 2025-01-13 PROCEDURE — 25810000003 SODIUM CHLORIDE 0.9 % SOLUTION: Performed by: NURSE ANESTHETIST, CERTIFIED REGISTERED

## 2025-01-13 PROCEDURE — 06BP4ZZ EXCISION OF RIGHT SAPHENOUS VEIN, PERCUTANEOUS ENDOSCOPIC APPROACH: ICD-10-PCS | Performed by: THORACIC SURGERY (CARDIOTHORACIC VASCULAR SURGERY)

## 2025-01-13 PROCEDURE — 25010000002 AMIODARONE IN DEXTROSE 5% 360-4.14 MG/200ML-% SOLUTION: Performed by: THORACIC SURGERY (CARDIOTHORACIC VASCULAR SURGERY)

## 2025-01-13 PROCEDURE — 85730 THROMBOPLASTIN TIME PARTIAL: CPT | Performed by: THORACIC SURGERY (CARDIOTHORACIC VASCULAR SURGERY)

## 2025-01-13 PROCEDURE — 83050 HGB METHEMOGLOBIN QUAN: CPT

## 2025-01-13 PROCEDURE — 25810000003 LACTATED RINGERS PER 1000 ML: Performed by: ANESTHESIOLOGY

## 2025-01-13 PROCEDURE — 25010000002 NITROGLYCERIN 200 MCG/ML SOLUTION: Performed by: THORACIC SURGERY (CARDIOTHORACIC VASCULAR SURGERY)

## 2025-01-13 PROCEDURE — 84295 ASSAY OF SERUM SODIUM: CPT

## 2025-01-13 PROCEDURE — 33508 ENDOSCOPIC VEIN HARVEST: CPT | Performed by: THORACIC SURGERY (CARDIOTHORACIC VASCULAR SURGERY)

## 2025-01-13 PROCEDURE — 25010000002 POTASSIUM CHLORIDE PER 2 MEQ OF POTASSIUM: Performed by: THORACIC SURGERY (CARDIOTHORACIC VASCULAR SURGERY)

## 2025-01-13 DEVICE — HEMOST ABS SURGIFOAM SZ100 8X12 10MM: Type: IMPLANTABLE DEVICE | Site: STERNUM | Status: FUNCTIONAL

## 2025-01-13 DEVICE — IMPLANTABLE DEVICE: Type: IMPLANTABLE DEVICE | Site: HEART | Status: FUNCTIONAL

## 2025-01-13 DEVICE — KT HEMOST ABS SURGIFOAM PORCN 1GRAM: Type: IMPLANTABLE DEVICE | Site: CHEST | Status: FUNCTIONAL

## 2025-01-13 DEVICE — SUT MF STL K60 SZ5 18X6IN PK/6: Type: IMPLANTABLE DEVICE | Site: STERNUM | Status: FUNCTIONAL

## 2025-01-13 DEVICE — CLIP LIGAT VASC HORIZON TI LG ORNG 6CT: Type: IMPLANTABLE DEVICE | Site: CHEST | Status: FUNCTIONAL

## 2025-01-13 DEVICE — WR SUT NONABS MF SS CCS 1/2CIR CUT/CONV 5/0 18IN M657G: Type: IMPLANTABLE DEVICE | Site: STERNUM | Status: FUNCTIONAL

## 2025-01-13 DEVICE — DISK-SHAPED STYLE, SILICONE (1 PER STERILE PKG)
Type: IMPLANTABLE DEVICE | Site: HEART | Status: FUNCTIONAL
Brand: SCANLAN® RADIOMARK® GRAFT MARKERS

## 2025-01-13 DEVICE — PLEDGET INCISIONLINE REINF TFE SFT PTFE 1.5X3X7MM WHT: Type: IMPLANTABLE DEVICE | Site: HEART | Status: FUNCTIONAL

## 2025-01-13 RX ORDER — ACETAMINOPHEN 160 MG/5ML
1000 SOLUTION ORAL EVERY 6 HOURS
Status: DISCONTINUED | OUTPATIENT
Start: 2025-01-14 | End: 2025-01-18 | Stop reason: HOSPADM

## 2025-01-13 RX ORDER — OXYCODONE HYDROCHLORIDE 10 MG/1
10 TABLET ORAL
Status: DISCONTINUED | OUTPATIENT
Start: 2025-01-13 | End: 2025-01-15

## 2025-01-13 RX ORDER — PREGABALIN 25 MG/1
25 CAPSULE ORAL EVERY 12 HOURS SCHEDULED
Status: DISCONTINUED | OUTPATIENT
Start: 2025-01-14 | End: 2025-01-18 | Stop reason: HOSPADM

## 2025-01-13 RX ORDER — CHLORHEXIDINE GLUCONATE 500 MG/1
1 CLOTH TOPICAL EVERY 24 HOURS
Status: DISCONTINUED | OUTPATIENT
Start: 2025-01-14 | End: 2025-01-16

## 2025-01-13 RX ORDER — ROCURONIUM BROMIDE 10 MG/ML
INJECTION, SOLUTION INTRAVENOUS AS NEEDED
Status: DISCONTINUED | OUTPATIENT
Start: 2025-01-13 | End: 2025-01-13 | Stop reason: SURG

## 2025-01-13 RX ORDER — DIPHENHYDRAMINE HYDROCHLORIDE 50 MG/ML
25 INJECTION INTRAMUSCULAR; INTRAVENOUS ONCE
Status: COMPLETED | OUTPATIENT
Start: 2025-01-13 | End: 2025-01-13

## 2025-01-13 RX ORDER — POTASSIUM CHLORIDE, DEXTROSE MONOHYDRATE 150; 5 MG/100ML; G/100ML
30 INJECTION, SOLUTION INTRAVENOUS CONTINUOUS
Status: DISCONTINUED | OUTPATIENT
Start: 2025-01-13 | End: 2025-01-13

## 2025-01-13 RX ORDER — NICOTINE POLACRILEX 4 MG
15 LOZENGE BUCCAL
Status: DISCONTINUED | OUTPATIENT
Start: 2025-01-13 | End: 2025-01-14

## 2025-01-13 RX ORDER — POLYETHYLENE GLYCOL 3350 17 G/17G
17 POWDER, FOR SOLUTION ORAL DAILY
Status: DISCONTINUED | OUTPATIENT
Start: 2025-01-14 | End: 2025-01-18 | Stop reason: HOSPADM

## 2025-01-13 RX ORDER — IBUPROFEN 600 MG/1
1 TABLET ORAL
Status: DISCONTINUED | OUTPATIENT
Start: 2025-01-13 | End: 2025-01-13 | Stop reason: HOSPADM

## 2025-01-13 RX ORDER — FENTANYL CITRATE 50 UG/ML
25 INJECTION, SOLUTION INTRAMUSCULAR; INTRAVENOUS
Status: DISCONTINUED | OUTPATIENT
Start: 2025-01-13 | End: 2025-01-13 | Stop reason: HOSPADM

## 2025-01-13 RX ORDER — PHENYLEPHRINE HYDROCHLORIDE 10 MG/ML
INJECTION INTRAVENOUS AS NEEDED
Status: DISCONTINUED | OUTPATIENT
Start: 2025-01-13 | End: 2025-01-13 | Stop reason: SURG

## 2025-01-13 RX ORDER — DEXTROSE MONOHYDRATE 25 G/50ML
10-50 INJECTION, SOLUTION INTRAVENOUS
Status: DISCONTINUED | OUTPATIENT
Start: 2025-01-13 | End: 2025-01-13 | Stop reason: HOSPADM

## 2025-01-13 RX ORDER — PROPOFOL 10 MG/ML
VIAL (ML) INTRAVENOUS AS NEEDED
Status: DISCONTINUED | OUTPATIENT
Start: 2025-01-13 | End: 2025-01-13 | Stop reason: SURG

## 2025-01-13 RX ORDER — CLOPIDOGREL BISULFATE 75 MG/1
75 TABLET ORAL DAILY
Status: DISCONTINUED | OUTPATIENT
Start: 2025-01-14 | End: 2025-01-18 | Stop reason: HOSPADM

## 2025-01-13 RX ORDER — SODIUM CHLORIDE 0.9 % (FLUSH) 0.9 %
3 SYRINGE (ML) INJECTION AS NEEDED
Status: DISCONTINUED | OUTPATIENT
Start: 2025-01-13 | End: 2025-01-13 | Stop reason: HOSPADM

## 2025-01-13 RX ORDER — ACETAMINOPHEN 500 MG
1000 TABLET ORAL EVERY 6 HOURS
Status: DISCONTINUED | OUTPATIENT
Start: 2025-01-14 | End: 2025-01-18 | Stop reason: HOSPADM

## 2025-01-13 RX ORDER — SODIUM CHLORIDE 0.9 % (FLUSH) 0.9 %
3-10 SYRINGE (ML) INJECTION AS NEEDED
Status: DISCONTINUED | OUTPATIENT
Start: 2025-01-13 | End: 2025-01-13 | Stop reason: HOSPADM

## 2025-01-13 RX ORDER — ALBUTEROL SULFATE 0.83 MG/ML
2.5 SOLUTION RESPIRATORY (INHALATION) EVERY 4 HOURS PRN
Status: ACTIVE | OUTPATIENT
Start: 2025-01-13 | End: 2025-01-14

## 2025-01-13 RX ORDER — PANTOPRAZOLE SODIUM 40 MG/1
40 TABLET, DELAYED RELEASE ORAL
Status: DISCONTINUED | OUTPATIENT
Start: 2025-01-14 | End: 2025-01-15

## 2025-01-13 RX ORDER — DEXTROSE MONOHYDRATE 50 MG/ML
30 INJECTION, SOLUTION INTRAVENOUS CONTINUOUS
Status: DISCONTINUED | OUTPATIENT
Start: 2025-01-13 | End: 2025-01-15

## 2025-01-13 RX ORDER — IPRATROPIUM BROMIDE AND ALBUTEROL SULFATE 2.5; .5 MG/3ML; MG/3ML
1.5 SOLUTION RESPIRATORY (INHALATION)
Status: DISCONTINUED | OUTPATIENT
Start: 2025-01-13 | End: 2025-01-15

## 2025-01-13 RX ORDER — ASPIRIN 81 MG/1
162 TABLET, CHEWABLE ORAL ONCE
Status: COMPLETED | OUTPATIENT
Start: 2025-01-14 | End: 2025-01-14

## 2025-01-13 RX ORDER — SODIUM CHLORIDE, SODIUM LACTATE, POTASSIUM CHLORIDE, CALCIUM CHLORIDE 600; 310; 30; 20 MG/100ML; MG/100ML; MG/100ML; MG/100ML
9 INJECTION, SOLUTION INTRAVENOUS CONTINUOUS
Status: DISCONTINUED | OUTPATIENT
Start: 2025-01-13 | End: 2025-01-15

## 2025-01-13 RX ORDER — HEPARIN SODIUM 1000 [USP'U]/ML
INJECTION, SOLUTION INTRAVENOUS; SUBCUTANEOUS AS NEEDED
Status: DISCONTINUED | OUTPATIENT
Start: 2025-01-13 | End: 2025-01-13 | Stop reason: SURG

## 2025-01-13 RX ORDER — METHYLPREDNISOLONE SODIUM SUCCINATE 125 MG/2ML
125 INJECTION, POWDER, LYOPHILIZED, FOR SOLUTION INTRAMUSCULAR; INTRAVENOUS ONCE
Status: COMPLETED | OUTPATIENT
Start: 2025-01-13 | End: 2025-01-13

## 2025-01-13 RX ORDER — SODIUM CHLORIDE 0.9 % (FLUSH) 0.9 %
10 SYRINGE (ML) INJECTION EVERY 12 HOURS SCHEDULED
Status: DISCONTINUED | OUTPATIENT
Start: 2025-01-13 | End: 2025-01-13 | Stop reason: HOSPADM

## 2025-01-13 RX ORDER — SODIUM CHLORIDE 0.9 % (FLUSH) 0.9 %
10 SYRINGE (ML) INJECTION AS NEEDED
Status: DISCONTINUED | OUTPATIENT
Start: 2025-01-13 | End: 2025-01-13 | Stop reason: HOSPADM

## 2025-01-13 RX ORDER — DEXMEDETOMIDINE HYDROCHLORIDE 4 UG/ML
.2-1.5 INJECTION, SOLUTION INTRAVENOUS
Status: DISCONTINUED | OUTPATIENT
Start: 2025-01-13 | End: 2025-01-14

## 2025-01-13 RX ORDER — MIDAZOLAM HYDROCHLORIDE 2 MG/2ML
2 INJECTION, SOLUTION INTRAMUSCULAR; INTRAVENOUS ONCE
Status: COMPLETED | OUTPATIENT
Start: 2025-01-13 | End: 2025-01-13

## 2025-01-13 RX ORDER — MEPERIDINE HYDROCHLORIDE 50 MG/ML
25 INJECTION INTRAMUSCULAR; INTRAVENOUS; SUBCUTANEOUS
Status: ACTIVE | OUTPATIENT
Start: 2025-01-13 | End: 2025-01-14

## 2025-01-13 RX ORDER — PANTOPRAZOLE SODIUM 40 MG/10ML
40 INJECTION, POWDER, LYOPHILIZED, FOR SOLUTION INTRAVENOUS ONCE
Status: COMPLETED | OUTPATIENT
Start: 2025-01-13 | End: 2025-01-13

## 2025-01-13 RX ORDER — LIDOCAINE HYDROCHLORIDE 10 MG/ML
0.5 INJECTION, SOLUTION EPIDURAL; INFILTRATION; INTRACAUDAL; PERINEURAL ONCE AS NEEDED
Status: DISCONTINUED | OUTPATIENT
Start: 2025-01-13 | End: 2025-01-13 | Stop reason: HOSPADM

## 2025-01-13 RX ORDER — ONDANSETRON 2 MG/ML
4 INJECTION INTRAMUSCULAR; INTRAVENOUS EVERY 6 HOURS PRN
Status: DISCONTINUED | OUTPATIENT
Start: 2025-01-13 | End: 2025-01-18 | Stop reason: HOSPADM

## 2025-01-13 RX ORDER — FAMOTIDINE 10 MG/ML
20 INJECTION, SOLUTION INTRAVENOUS ONCE
Status: COMPLETED | OUTPATIENT
Start: 2025-01-13 | End: 2025-01-13

## 2025-01-13 RX ORDER — ACETAMINOPHEN 650 MG/1
650 SUPPOSITORY RECTAL EVERY 6 HOURS
Status: DISCONTINUED | OUTPATIENT
Start: 2025-01-14 | End: 2025-01-18 | Stop reason: HOSPADM

## 2025-01-13 RX ORDER — NICARDIPINE HYDROCHLORIDE 2.5 MG/ML
INJECTION INTRAVENOUS AS NEEDED
Status: DISCONTINUED | OUTPATIENT
Start: 2025-01-13 | End: 2025-01-13 | Stop reason: SURG

## 2025-01-13 RX ORDER — MAGNESIUM HYDROXIDE 1200 MG/15ML
LIQUID ORAL AS NEEDED
Status: DISCONTINUED | OUTPATIENT
Start: 2025-01-13 | End: 2025-01-13 | Stop reason: HOSPADM

## 2025-01-13 RX ORDER — DEXTROSE MONOHYDRATE 25 G/50ML
12.5 INJECTION, SOLUTION INTRAVENOUS AS NEEDED
Status: DISCONTINUED | OUTPATIENT
Start: 2025-01-13 | End: 2025-01-13 | Stop reason: HOSPADM

## 2025-01-13 RX ORDER — METOPROLOL TARTRATE 25 MG/1
12.5 TABLET, FILM COATED ORAL EVERY 12 HOURS SCHEDULED
Status: DISCONTINUED | OUTPATIENT
Start: 2025-01-14 | End: 2025-01-18 | Stop reason: HOSPADM

## 2025-01-13 RX ORDER — ATORVASTATIN CALCIUM 10 MG/1
20 TABLET, FILM COATED ORAL NIGHTLY
Status: DISCONTINUED | OUTPATIENT
Start: 2025-01-14 | End: 2025-01-18 | Stop reason: HOSPADM

## 2025-01-13 RX ORDER — OXYCODONE HYDROCHLORIDE 5 MG/1
5 TABLET ORAL
Status: DISCONTINUED | OUTPATIENT
Start: 2025-01-13 | End: 2025-01-15

## 2025-01-13 RX ORDER — NICOTINE POLACRILEX 4 MG
15 LOZENGE BUCCAL
Status: DISCONTINUED | OUTPATIENT
Start: 2025-01-13 | End: 2025-01-13 | Stop reason: HOSPADM

## 2025-01-13 RX ORDER — IBUPROFEN 600 MG/1
1 TABLET ORAL
Status: DISCONTINUED | OUTPATIENT
Start: 2025-01-13 | End: 2025-01-14

## 2025-01-13 RX ORDER — FENTANYL CITRATE 50 UG/ML
25 INJECTION, SOLUTION INTRAMUSCULAR; INTRAVENOUS
Status: DISCONTINUED | OUTPATIENT
Start: 2025-01-13 | End: 2025-01-14

## 2025-01-13 RX ORDER — SODIUM CHLORIDE 0.9 % (FLUSH) 0.9 %
30 SYRINGE (ML) INJECTION ONCE AS NEEDED
Status: DISCONTINUED | OUTPATIENT
Start: 2025-01-13 | End: 2025-01-13 | Stop reason: HOSPADM

## 2025-01-13 RX ORDER — SODIUM CHLORIDE 0.9 % (FLUSH) 0.9 %
3 SYRINGE (ML) INJECTION EVERY 12 HOURS SCHEDULED
Status: DISCONTINUED | OUTPATIENT
Start: 2025-01-13 | End: 2025-01-13 | Stop reason: HOSPADM

## 2025-01-13 RX ORDER — AMIODARONE HYDROCHLORIDE 200 MG/1
400 TABLET ORAL 2 TIMES DAILY WITH MEALS
Status: DISCONTINUED | OUTPATIENT
Start: 2025-01-14 | End: 2025-01-14

## 2025-01-13 RX ORDER — NALOXONE HCL 0.4 MG/ML
0.4 VIAL (ML) INJECTION
Status: DISCONTINUED | OUTPATIENT
Start: 2025-01-13 | End: 2025-01-14

## 2025-01-13 RX ORDER — CHLORHEXIDINE GLUCONATE ORAL RINSE 1.2 MG/ML
15 SOLUTION DENTAL EVERY 12 HOURS
Status: DISCONTINUED | OUTPATIENT
Start: 2025-01-13 | End: 2025-01-15

## 2025-01-13 RX ORDER — CHLORHEXIDINE GLUCONATE ORAL RINSE 1.2 MG/ML
15 SOLUTION DENTAL EVERY 12 HOURS SCHEDULED
Status: DISCONTINUED | OUTPATIENT
Start: 2025-01-13 | End: 2025-01-13 | Stop reason: SDUPTHER

## 2025-01-13 RX ORDER — ASPIRIN 81 MG/1
81 TABLET ORAL DAILY
Status: DISCONTINUED | OUTPATIENT
Start: 2025-01-15 | End: 2025-01-18 | Stop reason: HOSPADM

## 2025-01-13 RX ORDER — ACETAMINOPHEN 500 MG
1000 TABLET ORAL ONCE
Status: COMPLETED | OUTPATIENT
Start: 2025-01-13 | End: 2025-01-13

## 2025-01-13 RX ORDER — CITALOPRAM HYDROBROMIDE 20 MG/1
10 TABLET ORAL DAILY
Status: DISCONTINUED | OUTPATIENT
Start: 2025-01-14 | End: 2025-01-18 | Stop reason: HOSPADM

## 2025-01-13 RX ORDER — FENTANYL CITRATE 50 UG/ML
50 INJECTION, SOLUTION INTRAMUSCULAR; INTRAVENOUS
Status: DISCONTINUED | OUTPATIENT
Start: 2025-01-13 | End: 2025-01-14

## 2025-01-13 RX ORDER — SODIUM CHLORIDE, SODIUM LACTATE, POTASSIUM CHLORIDE, CALCIUM CHLORIDE 600; 310; 30; 20 MG/100ML; MG/100ML; MG/100ML; MG/100ML
1000 INJECTION, SOLUTION INTRAVENOUS ONCE
Status: COMPLETED | OUTPATIENT
Start: 2025-01-13 | End: 2025-01-13

## 2025-01-13 RX ORDER — NITROGLYCERIN 20 MG/100ML
5 INJECTION INTRAVENOUS CONTINUOUS
Status: DISCONTINUED | OUTPATIENT
Start: 2025-01-13 | End: 2025-01-15

## 2025-01-13 RX ORDER — CHLORHEXIDINE GLUCONATE 500 MG/1
1 CLOTH TOPICAL ONCE
Status: COMPLETED | OUTPATIENT
Start: 2025-01-13 | End: 2025-01-13

## 2025-01-13 RX ORDER — BISACODYL 5 MG/1
10 TABLET, DELAYED RELEASE ORAL 2 TIMES DAILY
Status: DISCONTINUED | OUTPATIENT
Start: 2025-01-14 | End: 2025-01-18 | Stop reason: HOSPADM

## 2025-01-13 RX ORDER — DEXMEDETOMIDINE HYDROCHLORIDE 4 UG/ML
INJECTION, SOLUTION INTRAVENOUS CONTINUOUS PRN
Status: DISCONTINUED | OUTPATIENT
Start: 2025-01-13 | End: 2025-01-13 | Stop reason: SURG

## 2025-01-13 RX ORDER — PREGABALIN 25 MG/1
25 CAPSULE ORAL ONCE
Status: COMPLETED | OUTPATIENT
Start: 2025-01-13 | End: 2025-01-13

## 2025-01-13 RX ORDER — ENOXAPARIN SODIUM 100 MG/ML
40 INJECTION SUBCUTANEOUS DAILY
Status: DISCONTINUED | OUTPATIENT
Start: 2025-01-14 | End: 2025-01-18 | Stop reason: HOSPADM

## 2025-01-13 RX ORDER — CHLORHEXIDINE GLUCONATE 500 MG/1
CLOTH TOPICAL EVERY 12 HOURS PRN
Status: DISCONTINUED | OUTPATIENT
Start: 2025-01-13 | End: 2025-01-13 | Stop reason: HOSPADM

## 2025-01-13 RX ORDER — SUFENTANIL CITRATE 50 UG/ML
INJECTION EPIDURAL; INTRAVENOUS AS NEEDED
Status: DISCONTINUED | OUTPATIENT
Start: 2025-01-13 | End: 2025-01-13 | Stop reason: SURG

## 2025-01-13 RX ORDER — LIDOCAINE HYDROCHLORIDE 20 MG/ML
INJECTION, SOLUTION EPIDURAL; INFILTRATION; INTRACAUDAL; PERINEURAL AS NEEDED
Status: DISCONTINUED | OUTPATIENT
Start: 2025-01-13 | End: 2025-01-13 | Stop reason: SURG

## 2025-01-13 RX ORDER — METOPROLOL TARTRATE 1 MG/ML
INJECTION, SOLUTION INTRAVENOUS AS NEEDED
Status: DISCONTINUED | OUTPATIENT
Start: 2025-01-13 | End: 2025-01-13 | Stop reason: SURG

## 2025-01-13 RX ORDER — ALBUMIN HUMAN 50 G/1000ML
500 SOLUTION INTRAVENOUS ONCE
Status: COMPLETED | OUTPATIENT
Start: 2025-01-13 | End: 2025-01-13

## 2025-01-13 RX ORDER — CEFAZOLIN SODIUM 1 G/3ML
INJECTION, POWDER, FOR SOLUTION INTRAMUSCULAR; INTRAVENOUS AS NEEDED
Status: DISCONTINUED | OUTPATIENT
Start: 2025-01-13 | End: 2025-01-13 | Stop reason: SURG

## 2025-01-13 RX ORDER — PHENYLEPHRINE HCL IN 0.9% NACL 50MG/250ML
.1-3 PLASTIC BAG, INJECTION (ML) INTRAVENOUS CONTINUOUS PRN
Status: DISCONTINUED | OUTPATIENT
Start: 2025-01-13 | End: 2025-01-15

## 2025-01-13 RX ORDER — MIDAZOLAM HYDROCHLORIDE 2 MG/2ML
1 INJECTION, SOLUTION INTRAMUSCULAR; INTRAVENOUS
Status: COMPLETED | OUTPATIENT
Start: 2025-01-13 | End: 2025-01-13

## 2025-01-13 RX ORDER — LIDOCAINE 4 G/G
2 PATCH TOPICAL
Status: DISCONTINUED | OUTPATIENT
Start: 2025-01-13 | End: 2025-01-18 | Stop reason: HOSPADM

## 2025-01-13 RX ORDER — DEXTROSE MONOHYDRATE 25 G/50ML
10-50 INJECTION, SOLUTION INTRAVENOUS
Status: DISCONTINUED | OUTPATIENT
Start: 2025-01-13 | End: 2025-01-14

## 2025-01-13 RX ORDER — PROTAMINE SULFATE 10 MG/ML
INJECTION, SOLUTION INTRAVENOUS AS NEEDED
Status: DISCONTINUED | OUTPATIENT
Start: 2025-01-13 | End: 2025-01-13 | Stop reason: SURG

## 2025-01-13 RX ORDER — SODIUM CHLORIDE 9 MG/ML
30 INJECTION, SOLUTION INTRAVENOUS CONTINUOUS PRN
Status: DISCONTINUED | OUTPATIENT
Start: 2025-01-13 | End: 2025-01-13 | Stop reason: HOSPADM

## 2025-01-13 RX ORDER — ACETAMINOPHEN 10 MG/ML
1000 INJECTION, SOLUTION INTRAVENOUS EVERY 8 HOURS
Status: COMPLETED | OUTPATIENT
Start: 2025-01-13 | End: 2025-01-14

## 2025-01-13 RX ADMIN — SUFENTANIL CITRATE 20 MCG: 50 INJECTION, SOLUTION EPIDURAL; INTRAVENOUS at 07:40

## 2025-01-13 RX ADMIN — MIDAZOLAM HYDROCHLORIDE 2 MG: 1 INJECTION, SOLUTION INTRAMUSCULAR; INTRAVENOUS at 06:40

## 2025-01-13 RX ADMIN — MIDAZOLAM HYDROCHLORIDE 2 MG: 1 INJECTION, SOLUTION INTRAMUSCULAR; INTRAVENOUS at 07:31

## 2025-01-13 RX ADMIN — SODIUM CHLORIDE: 9 INJECTION, SOLUTION INTRAVENOUS at 07:38

## 2025-01-13 RX ADMIN — SODIUM CHLORIDE 2 UNITS/HR: 9 INJECTION, SOLUTION INTRAVENOUS at 09:30

## 2025-01-13 RX ADMIN — PROPOFOL 80 MG: 10 INJECTION, EMULSION INTRAVENOUS at 07:31

## 2025-01-13 RX ADMIN — LIDOCAINE 2 PATCH: 0.04 PATCH TOPICAL at 14:13

## 2025-01-13 RX ADMIN — HEPARIN SODIUM 34000 UNITS: 1000 INJECTION, SOLUTION INTRAVENOUS; SUBCUTANEOUS at 09:15

## 2025-01-13 RX ADMIN — VANCOMYCIN HYDROCHLORIDE 1000 MG: 1 INJECTION, POWDER, LYOPHILIZED, FOR SOLUTION INTRAVENOUS at 20:09

## 2025-01-13 RX ADMIN — METHYLPREDNISOLONE SODIUM SUCCINATE 125 MG: 125 INJECTION, POWDER, FOR SOLUTION INTRAMUSCULAR; INTRAVENOUS at 21:13

## 2025-01-13 RX ADMIN — AMIODARONE HYDROCHLORIDE 0.5 MG/MIN: 1.8 INJECTION, SOLUTION INTRAVENOUS at 23:32

## 2025-01-13 RX ADMIN — PHENYLEPHRINE HYDROCHLORIDE 200 MCG: 10 INJECTION INTRAVENOUS at 09:40

## 2025-01-13 RX ADMIN — DEXMEDETOMIDINE HYDROCHLORIDE 1 MCG/KG/HR: 4 INJECTION, SOLUTION INTRAVENOUS at 11:39

## 2025-01-13 RX ADMIN — HEPARIN SODIUM 3000 UNITS: 1000 INJECTION, SOLUTION INTRAVENOUS; SUBCUTANEOUS at 08:19

## 2025-01-13 RX ADMIN — DEXTROSE MONOHYDRATE 30 ML/HR: 50 INJECTION, SOLUTION INTRAVENOUS at 15:31

## 2025-01-13 RX ADMIN — ROCURONIUM 50 MG: 50 INJECTION, SOLUTION INTRAVENOUS at 12:00

## 2025-01-13 RX ADMIN — ONDANSETRON 4 MG: 2 INJECTION INTRAMUSCULAR; INTRAVENOUS at 20:09

## 2025-01-13 RX ADMIN — Medication 3 ML: at 06:17

## 2025-01-13 RX ADMIN — PROTAMINE SULFATE 200 MG: 10 INJECTION, SOLUTION INTRAVENOUS at 11:55

## 2025-01-13 RX ADMIN — SUFENTANIL CITRATE 20 MCG: 50 INJECTION, SOLUTION EPIDURAL; INTRAVENOUS at 07:50

## 2025-01-13 RX ADMIN — SODIUM PHOSPHATE, MONOBASIC, MONOHYDRATE AND SODIUM PHOSPHATE, DIBASIC, ANHYDROUS 15 MMOL: 276; 142 INJECTION, SOLUTION INTRAVENOUS at 18:35

## 2025-01-13 RX ADMIN — SODIUM CHLORIDE, POTASSIUM CHLORIDE, SODIUM LACTATE AND CALCIUM CHLORIDE: 600; 310; 30; 20 INJECTION, SOLUTION INTRAVENOUS at 07:38

## 2025-01-13 RX ADMIN — LIDOCAINE HYDROCHLORIDE 100 MG: 20 INJECTION, SOLUTION EPIDURAL; INFILTRATION; INTRACAUDAL; PERINEURAL at 07:31

## 2025-01-13 RX ADMIN — NICARDIPINE HYDROCHLORIDE 250 MCG: 2.5 INJECTION INTRAVENOUS at 12:09

## 2025-01-13 RX ADMIN — IPRATROPIUM BROMIDE AND ALBUTEROL SULFATE 1.5 ML: 2.5; .5 SOLUTION RESPIRATORY (INHALATION) at 14:43

## 2025-01-13 RX ADMIN — ACETAMINOPHEN 1000 MG: 10 INJECTION, SOLUTION INTRAVENOUS at 14:13

## 2025-01-13 RX ADMIN — PHENYLEPHRINE HYDROCHLORIDE 100 MCG: 10 INJECTION INTRAVENOUS at 09:10

## 2025-01-13 RX ADMIN — Medication 1 APPLICATION: at 17:52

## 2025-01-13 RX ADMIN — ACETAMINOPHEN 1000 MG: 10 INJECTION, SOLUTION INTRAVENOUS at 21:51

## 2025-01-13 RX ADMIN — SUFENTANIL CITRATE 40 MCG: 50 INJECTION, SOLUTION EPIDURAL; INTRAVENOUS at 08:59

## 2025-01-13 RX ADMIN — ROCURONIUM 30 MG: 50 INJECTION, SOLUTION INTRAVENOUS at 08:59

## 2025-01-13 RX ADMIN — CHLORHEXIDINE GLUCONATE 1 APPLICATION: 500 CLOTH TOPICAL at 14:07

## 2025-01-13 RX ADMIN — PANTOPRAZOLE SODIUM 40 MG: 40 INJECTION, POWDER, FOR SOLUTION INTRAVENOUS at 14:13

## 2025-01-13 RX ADMIN — ALBUMIN (HUMAN) 500 ML: 12.5 INJECTION, SOLUTION INTRAVENOUS at 19:59

## 2025-01-13 RX ADMIN — DEXMEDETOMIDINE HYDROCHLORIDE 0.5 MCG/KG/HR: 4 INJECTION, SOLUTION INTRAVENOUS at 14:07

## 2025-01-13 RX ADMIN — FENTANYL CITRATE 100 MCG: 50 INJECTION, SOLUTION INTRAMUSCULAR; INTRAVENOUS at 08:33

## 2025-01-13 RX ADMIN — DIPHENHYDRAMINE HYDROCHLORIDE 25 MG: 50 INJECTION, SOLUTION INTRAMUSCULAR; INTRAVENOUS at 21:13

## 2025-01-13 RX ADMIN — PHENYLEPHRINE HYDROCHLORIDE 100 MCG: 10 INJECTION INTRAVENOUS at 09:00

## 2025-01-13 RX ADMIN — FENTANYL CITRATE 75 MCG: 50 INJECTION, SOLUTION INTRAMUSCULAR; INTRAVENOUS at 11:29

## 2025-01-13 RX ADMIN — CEFAZOLIN 2 G: 330 INJECTION, POWDER, FOR SOLUTION INTRAMUSCULAR; INTRAVENOUS at 08:14

## 2025-01-13 RX ADMIN — NITROGLYCERIN 5 MCG/MIN: 20 INJECTION INTRAVENOUS at 14:13

## 2025-01-13 RX ADMIN — SODIUM CHLORIDE, POTASSIUM CHLORIDE, SODIUM LACTATE AND CALCIUM CHLORIDE 1000 ML: 600; 310; 30; 20 INJECTION, SOLUTION INTRAVENOUS at 06:20

## 2025-01-13 RX ADMIN — DEXTROSE MONOHYDRATE 30 ML/HR: 50 INJECTION, SOLUTION INTRAVENOUS at 15:30

## 2025-01-13 RX ADMIN — PHENYLEPHRINE HYDROCHLORIDE 200 MCG: 10 INJECTION INTRAVENOUS at 07:42

## 2025-01-13 RX ADMIN — SUFENTANIL CITRATE 20 MCG: 50 INJECTION, SOLUTION EPIDURAL; INTRAVENOUS at 07:31

## 2025-01-13 RX ADMIN — PHENYLEPHRINE HYDROCHLORIDE 100 MCG: 10 INJECTION INTRAVENOUS at 09:30

## 2025-01-13 RX ADMIN — PHENYLEPHRINE HYDROCHLORIDE 100 MCG: 10 INJECTION INTRAVENOUS at 09:20

## 2025-01-13 RX ADMIN — FAMOTIDINE 20 MG: 10 INJECTION INTRAVENOUS at 21:12

## 2025-01-13 RX ADMIN — PREGABALIN 25 MG: 25 CAPSULE ORAL at 05:52

## 2025-01-13 RX ADMIN — CHLORHEXIDINE GLUCONATE 0.12% ORAL RINSE 15 ML: 1.2 LIQUID ORAL at 17:53

## 2025-01-13 RX ADMIN — FENTANYL CITRATE 25 MCG: 50 INJECTION, SOLUTION INTRAMUSCULAR; INTRAVENOUS at 14:32

## 2025-01-13 RX ADMIN — IPRATROPIUM BROMIDE AND ALBUTEROL SULFATE 1.5 ML: 2.5; .5 SOLUTION RESPIRATORY (INHALATION) at 19:45

## 2025-01-13 RX ADMIN — CEFAZOLIN 2 G: 330 INJECTION, POWDER, FOR SOLUTION INTRAMUSCULAR; INTRAVENOUS at 12:15

## 2025-01-13 RX ADMIN — ROCURONIUM 20 MG: 50 INJECTION, SOLUTION INTRAVENOUS at 10:00

## 2025-01-13 RX ADMIN — METOPROLOL TARTRATE 1 MG: 5 INJECTION INTRAVENOUS at 07:24

## 2025-01-13 RX ADMIN — AMINOCAPROIC ACID 1 G/HR: 250 INJECTION, SOLUTION INTRAVENOUS at 07:38

## 2025-01-13 RX ADMIN — VANCOMYCIN HYDROCHLORIDE 1250 MG: 10 INJECTION, POWDER, LYOPHILIZED, FOR SOLUTION INTRAVENOUS at 06:29

## 2025-01-13 RX ADMIN — AMIODARONE HYDROCHLORIDE 1 MG/MIN: 1.8 INJECTION, SOLUTION INTRAVENOUS at 17:52

## 2025-01-13 RX ADMIN — CHLORHEXIDINE GLUCONATE: 500 CLOTH TOPICAL at 06:17

## 2025-01-13 RX ADMIN — PHENYLEPHRINE HYDROCHLORIDE 100 MCG: 10 INJECTION INTRAVENOUS at 09:35

## 2025-01-13 RX ADMIN — EPINEPHRINE 0.02 MCG/KG/MIN: 1 INJECTION INTRAMUSCULAR; INTRAVENOUS; SUBCUTANEOUS at 21:24

## 2025-01-13 RX ADMIN — MIDAZOLAM HYDROCHLORIDE 1.5 MG: 1 INJECTION, SOLUTION INTRAMUSCULAR; INTRAVENOUS at 10:01

## 2025-01-13 RX ADMIN — PHENYLEPHRINE HYDROCHLORIDE 100 MCG: 10 INJECTION INTRAVENOUS at 09:25

## 2025-01-13 RX ADMIN — ACETAMINOPHEN 1000 MG: 500 TABLET ORAL at 05:52

## 2025-01-13 RX ADMIN — VASOPRESSIN 0.02 UNITS/MIN: 20 INJECTION, SOLUTION INTRAVENOUS at 10:20

## 2025-01-13 RX ADMIN — MIDAZOLAM HYDROCHLORIDE 1.5 MG: 1 INJECTION, SOLUTION INTRAMUSCULAR; INTRAVENOUS at 11:29

## 2025-01-13 RX ADMIN — SODIUM CHLORIDE, POTASSIUM CHLORIDE, SODIUM LACTATE AND CALCIUM CHLORIDE 1000 ML: 600; 310; 30; 20 INJECTION, SOLUTION INTRAVENOUS at 15:24

## 2025-01-13 RX ADMIN — Medication 1 APPLICATION: at 05:52

## 2025-01-13 RX ADMIN — FENTANYL CITRATE 75 MCG: 50 INJECTION, SOLUTION INTRAMUSCULAR; INTRAVENOUS at 10:00

## 2025-01-13 RX ADMIN — ROCURONIUM 100 MG: 50 INJECTION, SOLUTION INTRAVENOUS at 07:31

## 2025-01-13 NOTE — ANESTHESIA PROCEDURE NOTES
Intra-Op Anesthesia AGNES    Procedure Performed: Intra-Op Anesthesia AGNES       Start Time:  1/13/2025 7:42 AM       End Time:   1/13/2025 7:47 AM    Preanesthesia Checklist:  Patient identified, IV assessed, risks and benefits discussed, monitors and equipment assessed, procedure being performed at surgeon's request and anesthesia consent obtained.    General Procedure Information  AGNES Placed for monitoring purposes only -- This is not a diagnostic AGNES

## 2025-01-13 NOTE — ANESTHESIA POSTPROCEDURE EVALUATION
"Patient: Nemo Lazaro    Procedure Summary       Date: 01/13/25 Room / Location:  [facility]  /  [facility]    Anesthesia Start: 0723 Anesthesia Stop: 1318    Procedure: CORONARY ARTERY BYPASS GRAFTING X3, LEFT INTERNAL MAMMARY ARTERY GRAFT, RIGHT LEG ENDOSCOPIC VEIN HARVEST, TRANSESOPHAGEAL ECHOCARDIOGRAM, APPLICATION OF PREVENA (Chest) Diagnosis:       Coronary artery disease of native artery of native heart with stable angina pectoris      (Coronary artery disease of native artery of native heart with stable angina pectoris [I25.118])    Surgeons: Lam Melgar MD Provider: Maxwell Flores CRNA    Anesthesia Type: general, Alyce, CVL, PAC ASA Status: 4            Anesthesia Type: general, Alyce, CVL, PAC    Vitals  Vitals Value Taken Time   BP     Temp     Pulse 59 01/13/25 1329   Resp     SpO2 92 % 01/13/25 1329   Vitals shown include unfiled device data.        Post Anesthesia Care and Evaluation    Patient location during evaluation: ICU  Patient participation: complete - patient cannot participate  Post-procedure mental status: sedated on vent.  Pain management: adequate    Airway patency: patent  Anesthetic complications: No anesthetic complications  PONV Status: NA  Cardiovascular status: acceptable  Respiratory status: acceptable  Hydration status: acceptable    Comments: Blood pressure 177/80, pulse 66, temperature 97 °F (36.1 °C), temperature source Temporal, resp. rate 18, height 163.4 cm (64.33\"), weight 82.5 kg (181 lb 14.1 oz), SpO2 96%.    "

## 2025-01-13 NOTE — ANESTHESIA PROCEDURE NOTES
Central Line    Pre-sedation assessment completed: 1/13/2025 7:36 AM    Patient reassessed immediately prior to procedure    Patient location during procedure: OR  Start time: 1/13/2025 7:36 AM  Stop Time:1/13/2025 7:41 AM  Indications: vascular access  Staff  Anesthesiologist: Sal Multani MD  Preanesthetic Checklist  Completed: patient identified, IV checked, site marked, risks and benefits discussed, surgical consent, monitors and equipment checked, pre-op evaluation and timeout performed  Central Line Prep  Sterile Tech:cap, gloves, mask, sterile barriers and gown  Prep: chloraprep  no medical exclusion documented for following all elements of maximal sterile barrier technique  Patient monitoring: blood pressure monitoring, continuous pulse oximetry and EKG  Central Line Procedure  Laterality:right  Location:internal jugular  Catheter Type:MAC  Catheter Size:9 Fr  Guidance:ultrasound guided  PROCEDURE NOTE/ULTRASOUND INTERPRETATION.  Using ultrasound guidance the potential vascular sites for insertion of the catheter were visualized to determine the patency of the vessel to be used for vascular access.  After selecting the appropriate site for insertion, the needle was visualized under ultrasound being inserted into the internal jugular vein, followed by ultrasound confirmation of wire and catheter placement. There were no abnormalities seen on ultrasound; an image was taken; and the patient tolerated the procedure with no complications. Images: still images not obtained  Assessment  Post procedure:occlusive dressing applied, line sutured and biopatch applied  Assessement:blood return through all ports, free fluid flow, chest x-ray ordered and Yeyo Test  Complications:no  Patient Tolerance:patient tolerated the procedure well with no apparent complications

## 2025-01-13 NOTE — PAYOR COMM NOTE
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 drip, make sure all other insulin orders and anti-diabetic medications have been discontinued.    01/13/25 1327    01/13/25 1415  dexmedetomidine (PRECEDEX) 400 mcg in 100 mL NS infusion  Titrated         01/13/25 1327    01/13/25 1415  aminocaproic acid (AMICAR) 15 g in sodium chloride 0.9 % 300 mL infusion  Continuous         01/13/25 1327    01/13/25 1415  amiodarone 360 mg in 200 mL D5W infusion  Continuous        Placed in \"Followed by\" Linked Group    01/13/25 1327    01/13/25 1415  Lidocaine 4 % 2 patch  Every 24 Hours Scheduled         01/13/25 1328    01/13/25 1415  ceFAZolin 2000 mg IVPB in 100 mL NS (MBP)  Every 8 Hours,   Status:  Discontinued         01/13/25 1328    01/13/25 1400  Vital Signs Every Hour Until 24 Hours Post Op  Every Hour    " "  Comments: Perform Vitals Less Frequently Only if Patient Stable      01/13/25 1327    01/13/25 1400  Check Peripheral Pulses Every 1 Hour x4  Every Hour       01/13/25 1327    01/13/25 1400  Intake & Output Every 15 Minutes x2, Every 30 Minutes x4  Every Hour       01/13/25 1327    01/13/25 1400  Intake & Output Every Hour Until 24 Hours Post Op  Every Hour       01/13/25 1327    01/13/25 1400  Cardiac Output Parameters On Admission, Then Every 1 Hour x4  Every Hour       01/13/25 1327    01/13/25 1400  Cardiac Output Parameters Every 2 Hours Until 24 Hours Post Op  Every Hour       01/13/25 1327    01/13/25 1400  Incentive Spirometry  Every Hour While Awake       01/13/25 1327    01/13/25 1400  Nurse and RT to Assess Patient for Readiness to Wean Criteria as Follows:  Every Hour      Comments: Patient is awake and following commands, Patient is spontaneously breathing, respiratory rate <25/min, Patient's hemodynamics are stable, Patient has no evidence of clinically significant bleeding, SpO2 >92% on <30% FiO2, PEEP < or = to 8.    01/13/25 1327    01/13/25 1400  acetaminophen (OFIRMEV) injection 1,000 mg  Every 8 Hours         01/13/25 1327    01/13/25 1346  Calcium, Ionized  PROCEDURE ONCE         01/13/25 1343    01/13/25 1345  pantoprazole (PROTONIX) injection 40 mg  Once        Placed in \"Followed by\" Linked Group    01/13/25 1328    01/13/25 1345  Blood Gas, Arterial -  PROCEDURE ONCE         01/13/25 1342    01/13/25 1329  Daily Weights  Daily       01/13/25 1327    01/13/25 1327  Code Status and Medical Interventions: CPR (Attempt to Resuscitate); Full Support  Continuous         01/13/25 1327    01/13/25 1327  Vital Signs & Post-Op Checks Every 15 Minutes x2, Every 30 Minutes x4  Per Hospital Policy        Comments: Perform Vitals Less Frequently Only if Patient Stable    01/13/25 1327    01/13/25 1327  Continuous Cardiac Monitoring  Continuous        Comments: Follow Standing Orders As Outlined in " "Process Instructions (Open Order Report to View Full Instructions)    01/13/25 1327    01/13/25 1327  Telemetry - Maintain IV Access  Continuous         01/13/25 1327    01/13/25 1327  Telemetry - Place Orders & Notify Provider of Results When Patient Experiences Acute Chest Pain, Dysrhythmia or Respiratory Distress  Until Discontinued         01/13/25 1327    01/13/25 1327  Continuous Pulse Oximetry  Continuous         01/13/25 1327    01/13/25 1327  Discontinue NG After Extubation  Once         01/13/25 1327    01/13/25 1327  Continue Indwelling Urinary Catheter  Once        Placed in \"And\" Linked Group    01/13/25 1327    01/13/25 1327  Assess Need for Indwelling Urinary Catheter - Follow Removal Protocol  Continuous        Comments: Indwelling Urinary Catheter Removal Criteria  Discontinue Indwelling Urinary Catheter Unless One of the Following is Present  Urinary Retention or Obstruction  Chronic Bueno Catheter Use  End of Life  Critical Illness with Strict I/O   Tract or Abdominal Surgery  Stage 3/4 Sacral / Perineal Wound  Required Activity Restriction: Trauma  Required Activity Restriction: Spine Surgery  If Patient is Being Followed by Urology Contact Them PRIOR to Removal  Do Not Remove Indwelling Urinary Catheter Order is Present with a CLINICAL REASON to Maintain the Catheter. Provider is Required to Include a Clinical Reason to Maintain a Urinary Catheter    Chronic Bueno Catheter Use (Present on Admission)  Assess for Continued Need & Document Medical Necessity  If Infection is Suspected, Contact the Provider       Placed in \"And\" Linked Group    01/13/25 1327    01/13/25 1327  Chest Tube Settings Chest Tube, Mediastinal Tube; -20 cm Suction  Continuous         01/13/25 1327    01/13/25 1327  Begin Rewarming with Thermal Unit As Needed For Temp Less Than 96F  Once         01/13/25 1327    01/13/25 1327  ACE Wraps to Vein [place] [facility] for 24 Hours, Then Apply INGRID Hose  Continuous         " 01/13/25 1327    01/13/25 1327  Apply Surgical Bra Per Order Details  Per Order Details        Comments: Apply Surgical Bra For Medial Sternotomy Incision Support    01/13/25 1327    01/13/25 1327  Monitor QTc  Every Shift       01/13/25 1327    01/13/25 1327  Notify Provider - QTc 500 milliseconds or Greater  Until Discontinued         01/13/25 1327    01/13/25 1327  Wound Dressing  Continuous         01/13/25 1327    01/13/25 1327  Wound Dressing  Continuous         01/13/25 1327    01/13/25 1327  Notify Surgeon if Drainage From Surgical Incision Site  Until Discontinued         01/13/25 1327    01/13/25 1327  ISOLATE PACER WIRES  Continuous         01/13/25 1327    01/13/25 1327  Turn Patient Every 2 Hours or Begin Bed Rotation Once Hemodynamically Stable  Now Then Every 2 Hours         01/13/25 1327    01/13/25 1327  Advance PROM to AROM  Now Then Every 4 Hours         01/13/25 1327    01/13/25 1327  Up in Chair for Meals  Until Discontinued         01/13/25 1327    01/13/25 1327  Notify Provider - Urine Output  Until Discontinued         01/13/25 1327    01/13/25 1327  Notify Provider - Chest Tube Output  Until Discontinued         01/13/25 1327    01/13/25 1327  Notify Provider (Specify)  Until Discontinued         01/13/25 1327    01/13/25 1327  Cardiac Sternal Precautions - Maintain at All Times & Teach Patient  Continuous        Comments: Maintain Sternal Precautions at All Times & Teach Patient    01/13/25 1327    01/13/25 1327  Fall Precautions  Continuous         01/13/25 1327    01/13/25 1327  Oxygen Therapy- Nasal Cannula; 2 LPM  Continuous         01/13/25 1327    01/13/25 1327  Ventilator - Vent Mode: Alternate (Custom) Mode: See Comments  Continuous,   Status:  Canceled        Comments: Initial Mechanical Ventilation Settings upon Arrival to the ICU: SIMV, Tidal Volume 5-7 mL/kg ideal body weight based on height formula, Respiratory Rate 14-16/min, PEEP 5 cmH2O if <90kg, PEEP 8 cmH2O if >90kg, FiO2  60%.    01/13/25 1327    01/13/25 1327  Ventilator - Vent Mode: Alternate (Custom) Mode: See Comments  Continuous        Comments: RT adjusts Mechanical Ventilation as needed to achieve: pH 7.35-7.45, pCO2 34-45 mmHg, PaO2 > 90 mmHg, SpO2 > 92%.    01/13/25 1327    01/13/25 1327  Extubate Patient If  All of the Following Criteria are Met:  Once        Comments: Patient tolerated Spontaneous Breathing Trial for 20 minutes, respiratory rate < 25/min, SpO2 >94%, patient remains hemodynamically stable, patient is awake and following commands, RSBI f/vt < 90, Elevate HOB > 35 degrees, Vital Capacity: 10-15 mL/kg, NIF >-25 cm H2O, minute ventilation <14 l/min, Obtain ABG: pH of 7.33 to 7.55, confirm with nurse if ok to extubate. Prior to extubation, Propofol must be off, ok to proceed with Precedex with provider order. If criteria NOT met: Wait 1 hour, reassess. If still does not meet criteria: call Physician.    01/13/25 1327    01/13/25 1327  Notify Physician if Vapotherm Mask Needed to Keep SpO2 Greater Than 90% or Continuing Respiratory Distress  Until Discontinued        Comments: Notify Physician if Vapotherm mask needed to keep SpO2 >90% or continuing respiratory distress.r    01/13/25 1327    01/13/25 1327  Call Physician if Racemic needed.  Until Discontinued        Comments: Call Physician if Racemic needed.    01/13/25 1327    01/13/25 1327  RT To Evaluate & Demonstrate Use of IS  Once        Comments: RT To Evaluate & Demonstrate Use of IS    01/13/25 1327    01/13/25 1327  XR Chest 1 View  1 Time Imaging         01/13/25 1327    01/13/25 1327  ECG 12 Lead Drug Monitoring  STAT         01/13/25 1327    01/13/25 1327  Protime-INR  STAT         01/13/25 1327    01/13/25 1327  aPTT  STAT         01/13/25 1327    01/13/25 1327  Calcium, Ionized  STAT         01/13/25 1327    01/13/25 1327  Blood Gas, Arterial -  STAT         01/13/25 1327    01/13/25 1327  CBC (No Diff)  Now Then Every 4 Hours       01/13/25 1327     01/13/25 1327  Renal Function Panel  Now Then Every 4 Hours       01/13/25 1327    01/13/25 1327  Draw Labs and Notify Provider if Chest Tube Output Greater Than 100mL/hr  Until Discontinued         01/13/25 1327    01/13/25 1327  Diet Instructions to Nursing  Until Discontinued        Comments: Advance diet on POD 1 if off insulin drip    01/13/25 1327    01/13/25 1327  Advance Diet As Tolerated -  Until Discontinued         01/13/25 1327    01/13/25 1327  Cardiac Rehab Evaluation  Once        Provider:  (Not yet assigned)    01/13/25 1327    01/13/25 1327  Consult to Case Management /   Once        Provider:  (Not yet assigned)    01/13/25 1327    01/13/25 1327  PT Consult: Eval & Treat  Once         01/13/25 1327 01/13/25 1327  Consult to Diabetes Educator  Once        Provider:  (Not yet assigned)    01/13/25 1327    01/13/25 1327  Inpatient Nutrition Consult  Once        Provider:  (Not yet assigned)    01/13/25 1327    01/13/25 1327  RN to Release PRN POC Glucose Orders Per Glucommander  Continuous         01/13/25 1327 01/13/25 1327  RN to Order STAT Glucose for BG Less Than 10 mg/dl or Greater Than 600 mg/dl  Continuous        Comments: Do not delay treatment of unstable patient in order to obtain glucose sample from Lab.   Inform provider of results.    01/13/25 1327    01/13/25 1327  Prior to Initiating Glucommander™, Ensure All Prior Insulin Orders are Discontinued  Once         01/13/25 1327    01/13/25 1327  PRIOR to START of Intravenous Insulin Infusion, Notify Provider if K+ is Less Than 3.3, for Extra Potassium Orders, if Indicated. Do Not Start Insulin Drip if K+ Less Than 3.3.  Per Order Details         01/13/25 1327    01/13/25 1327  Use a Dedicated Line for Insulin Infusion (If Possible).  May Use a Carrier Fluid of NS at KVO Rate if Insulin Rate is Insufficient to Maintain IV Patency.  Prime IV Line With Insulin Infusion  Continuous         01/13/25 1327    01/13/25 1327   "If Insulin Infusion is Discontinued, Glucommander Must Also be Discontinued. If Insulin Infusion is Re-Ordered Discontinue Previous Settings in Glucommander & Start New  Per Order Details         01/13/25 1327    01/13/25 1327  Patient is on Glucommander  Continuous         01/13/25 1327    01/13/25 1327  Notify Provider  Continuous        Comments: Open Order Report to View Parameters Requiring Provider Notification    01/13/25 1327    01/13/25 1327  If Patient Has Diet Order or Bolus Tube Feeds Utilize the Start Meal / Meal Bolus Feature in Glucommander  Continuous         01/13/25 1327    01/13/25 1327  Prevena Dressing to Remain in Place Until Removed By Provider  Until Discontinued         01/13/25 1328    01/13/25 1327  NPO Diet NPO Type: Strict NPO  Diet Effective Now         01/13/25 1328    01/13/25 1326  Urinary Catheter Care  Every Shift      Placed in \"And\" Linked Group    01/13/25 1327    01/13/25 1326  dextrose (GLUTOSE) oral gel 15 g  Every 15 Minutes PRN         01/13/25 1327    01/13/25 1326  dextrose (D50W) (25 g/50 mL) IV injection 10-50 mL  Every 15 Minutes PRN         01/13/25 1327    01/13/25 1326  glucagon (GLUCAGEN) injection 1 mg  Every 15 Minutes PRN         01/13/25 1327    01/13/25 1326  propofol (DIPRIVAN) infusion 10 mg/mL 100 mL  Continuous PRN         01/13/25 1327    01/13/25 1326  meperidine (DEMEROL) injection 25 mg  Every 1 Hour PRN         01/13/25 1327    01/13/25 1326  Potassium Replacement - Follow Nurse / BPA Driven Protocol  As Needed         01/13/25 1327    01/13/25 1326  Magnesium Cardiology Dose Replacement - Follow Nurse / BPA Driven Protocol  As Needed         01/13/25 1327    01/13/25 1326  Phosphorus Replacement - Follow Nurse / BPA Driven Protocol  As Needed         01/13/25 1327    01/13/25 1326  Calcium Replacement - Follow Nurse / BPA Driven Protocol  As Needed         01/13/25 1327    01/13/25 1326  ondansetron (ZOFRAN) injection 4 mg  Every 6 Hours PRN         " "01/13/25 1327    01/13/25 1326  oxyCODONE (ROXICODONE) immediate release tablet 5 mg  Every 3 Hours PRN         01/13/25 1327    01/13/25 1326  fentaNYL citrate (PF) (SUBLIMAZE) injection 25 mcg  Every 30 Minutes PRN        Placed in \"And\" Linked Group    01/13/25 1328    01/13/25 1326  naloxone (NARCAN) injection 0.4 mg  Every 5 Minutes PRN        Placed in \"And\" Linked Group    01/13/25 1328    01/13/25 1326  oxyCODONE (ROXICODONE) immediate release tablet 10 mg  Every 3 Hours PRN         01/13/25 1328    01/13/25 1326  fentaNYL citrate (PF) (SUBLIMAZE) injection 50 mcg  Every 30 Minutes PRN         01/13/25 1328    01/13/25 1326  vasopressin (PITRESSIN) 20 units in 100 mL NS infusion  As Needed         01/13/25 1328    01/13/25 1326  albuterol (PROVENTIL) nebulizer solution 0.083% 2.5 mg/3mL  Every 4 Hours PRN         01/13/25 1327    01/13/25 1326  Hold All immunizations  Continuous PRN         01/13/25 1327    01/13/25 1326  phenylephrine (MARK-SYNEPHRINE) 50 mg in 250 mL NS infusion  Continuous PRN         01/13/25 1327    01/13/25 1326  niCARdipine (CARDENE) 20 mg in 200 mL NS infusion  Continuous PRN         01/13/25 1327    01/13/25 1326  clevidipine (CLEVIPREX) infusion 0.5 mg/mL  Continuous PRN         01/13/25 1327    01/13/25 1319  Target Arousal Level RASS -1 to -2  Continuous         01/13/25 1319    01/13/25 1312  Inpatient Admission  Once         01/13/25 1319    01/13/25 1302  Arterial Blood Gas with Coox and Lactate  PROCEDURE ONCE         01/13/25 1258    01/13/25 1220  Arterial Blood Gas with Coox and Lactate  PROCEDURE ONCE         01/13/25 1218    01/13/25 1126  Arterial Blood Gas with Coox and Lactate  PROCEDURE ONCE         01/13/25 1125    01/13/25 1107  Arterial Blood Gas with Coox and Lactate  PROCEDURE ONCE         01/13/25 1105    01/13/25 1033  Arterial Blood Gas with Coox and Lactate  PROCEDURE ONCE         01/13/25 1031    01/13/25 0931  Arterial Blood Gas with Coox and Lactate  " PROCEDURE ONCE         01/13/25 0928    01/13/25 0900  sodium chloride 0.9 % flush 3 mL  Every 12 Hours Scheduled,   Status:  Discontinued         01/13/25 0541    01/13/25 0900  sodium chloride 0.9 % flush 10 mL  Every 12 Hours Scheduled,   Status:  Discontinued         01/13/25 0631    01/13/25 0827  sodium chloride 10 mL with thrombin 20,000 Units, vancomycin 1,000 mg mixture  As Needed,   Status:  Discontinued         01/13/25 0827    01/13/25 0826  sodium chloride 1,000 mL with heparin (porcine) 1,000 Units mixture  As Needed,   Status:  Discontinued         01/13/25 0826    01/13/25 0826  methylene blue 50 MG/10ML  As Needed,   Status:  Discontinued         01/13/25 0826 01/13/25 0826  sodium chloride (NS) irrigation solution  As Needed,   Status:  Discontinued         01/13/25 0826 01/13/25 0826  sterile water irrigation solution  As Needed,   Status:  Discontinued         01/13/25 0826 01/13/25 0826  sodium chloride 250 mL with papaverine 1 mL mixture  As Needed,   Status:  Discontinued         01/13/25 0827    01/13/25 0758  Arterial Blood Gas with Coox and Lactate  PROCEDURE ONCE         01/13/25 0756    01/13/25 0657  OR Blood Pickup  Once         01/13/25 0656    01/13/25 0633  lactated ringers infusion  Continuous         01/13/25 0631    01/13/25 0633  Midazolam HCl (PF) (VERSED) injection 2 mg  Once         01/13/25 0631    01/13/25 0632  Intra-Op TAVR Transesophageal Echo  Once         01/13/25 0632    01/13/25 0632  Oxygen Therapy- Nasal Cannula; Titrate 1-6 LPM Per SpO2; 90 - 95%  Continuous,   Status:  Canceled         01/13/25 0631    01/13/25 0632  Continuous Pulse Oximetry  Continuous,   Status:  Canceled         01/13/25 0631    01/13/25 0632  Insert Peripheral IV  Once,   Status:  Canceled         01/13/25 0631    01/13/25 0632  Saline Lock & Maintain IV Access  Continuous,   Status:  Canceled         01/13/25 0631    01/13/25 0631  Vital Signs - Per Anesthesia Protocol  As Needed,    Status:  Canceled       01/13/25 0631    01/13/25 0631  sodium chloride 0.9 % flush 10 mL  As Needed,   Status:  Discontinued         01/13/25 0631    01/13/25 0631  fentaNYL citrate (PF) (SUBLIMAZE) injection 25 mcg  Every 5 Minutes PRN,   Status:  Discontinued         01/13/25 0631    01/13/25 0631  Midazolam HCl (PF) (VERSED) injection 1 mg  Every 10 Minutes PRN         01/13/25 0631    01/13/25 0631  dextrose (D50W) (25 g/50 mL) IV injection 12.5 g  As Needed,   Status:  Discontinued         01/13/25 0631    01/13/25 0600  Instruct Patient on Coughing, Deep Breathing and Incentive Spirometry  Every 4 Hours While Awake,   Status:  Canceled       01/13/25 0541    01/13/25 0544  lactated ringers infusion 1,000 mL  Once         01/13/25 0542    01/13/25 0544  lactated ringers infusion 1,000 mL  Once         01/13/25 0542    01/13/25 0543  acetaminophen (TYLENOL) tablet 1,000 mg  Once         01/13/25 0541    01/13/25 0543  mupirocin (BACTROBAN) 2 % nasal ointment  Once         01/13/25 0541    01/13/25 0543  pregabalin (LYRICA) capsule 25 mg  Once         01/13/25 0541    01/13/25 0543  vancomycin 1250 mg/250 mL 0.9% NS IVPB ([facility])  Once         01/13/25 0541    01/13/25 0542  Follow Anesthesia Guidelines / Protocol  Once,   Status:  Canceled         01/13/25 0541    01/13/25 0542  STS Risk Score has been calculated and discussed with the patient and family  Continuous,   Status:  Canceled         01/13/25 0541    01/13/25 0542  Verify NPO Status  Continuous,   Status:  Canceled         01/13/25 0541    01/13/25 0542  Place Sequential Compression Device  Once,   Status:  Canceled         01/13/25 0541    01/13/25 0542  Clip Hair Chin to Ankles  Once,   Status:  Canceled         01/13/25 0541    01/13/25 0542  Notify Physician - Standard  Until Discontinued,   Status:  Canceled         01/13/25 0541    01/13/25 0542  Prepare RBC, 2 Units  Blood - Once         01/13/25 0541    01/13/25 0542  Prepare Platelet Pheresis,  1 Units  Blood - Once         01/13/25 0541    01/13/25 0542  Prepare Fresh Frozen Plasma, 2 Units  Blood - Once         01/13/25 0541    01/13/25 0542  Insert Peripheral IV x2  Once,   Status:  Canceled         01/13/25 0541    01/13/25 0542  Saline Lock & Maintain IV Access  Continuous,   Status:  Canceled         01/13/25 0541    01/13/25 0542  Follow Anesthesia Guidelines / Protocol  Once,   Status:  Canceled         01/13/25 0541    01/13/25 0542  Follow Anesthesia Guidelines / Protocol  Once,   Status:  Canceled         01/13/25 0542    01/13/25 0542  Please Insert a Second Peripheral IV If Indicated For Procedure (All DaVinci Cases, Gastric Bypass, Sleeve Surgery, AAA, Any Vascular Bypass, Carotid, Sigmoidectomy, Colectomy (Lap Assisted), Colon Resection, Nissen, Exploratory Laparotomy, Spinal...  Once,   Status:  Canceled        Comments: Please Insert a Second Peripheral IV If Indicated For Procedure (All DaVinci Cases, Gastric Bypass, Sleeve Surgery, AAA, Any Vascular Bypass, Carotid, Sigmoidectomy, Colectomy (Lap Assisted), Colon Resection, Nissen, Exploratory Laparotomy, Spinal Fusion, Craniotomy, Thoracotomy, CABG, TAVR, Valve Surgery or Mediastinoscopy, Femoral Endarterectomy, Carotid Endarterectomy, Anterior Lumbar Exposure, or all Aneurysm Repairs    01/13/25 0542 01/13/25 0542  Insert Peripheral IV  Once,   Status:  Canceled         01/13/25 0542    01/13/25 0542  Maintain IV Access  Continuous,   Status:  Canceled         01/13/25 0542    01/13/25 0542  Insert Peripheral IV  Once,   Status:  Canceled         01/13/25 0542 01/13/25 0542  Maintain IV Access  Continuous,   Status:  Canceled         01/13/25 0542 01/13/25 0542  POC Glucose STAT  STAT,   Status:  Canceled        Comments: Complete no more than 45 minutes prior to patient eating      01/13/25 0542 01/13/25 0542  Notify Anesthesia if Blood Sugar Greater Than 180  Once,   Status:  Canceled         01/13/25 0542    01/13/25 0541   sodium chloride 0.9 % flush 10 mL  As Needed,   Status:  Discontinued         01/13/25 0542 01/13/25 0541  lidocaine PF 1% (XYLOCAINE) injection 0.5 mL  Once As Needed,   Status:  Discontinued         01/13/25 0542 01/13/25 0541  sodium chloride 0.9 % flush 3 mL  As Needed,   Status:  Discontinued         01/13/25 0542 01/13/25 0541  sodium chloride 0.9 % flush 3-10 mL  As Needed,   Status:  Discontinued         01/13/25 0541 01/13/25 0541  sodium chloride 0.9 % flush 30 mL  Once As Needed,   Status:  Discontinued         01/13/25 0541 01/13/25 0541  sodium chloride 0.9 % infusion  Continuous PRN,   Status:  Discontinued         01/13/25 0541 01/13/25 0541  dextrose (GLUTOSE) oral gel 15 g  Every 15 Minutes PRN,   Status:  Discontinued         01/13/25 0541 01/13/25 0541  dextrose (D50W) (25 g/50 mL) IV injection 10-50 mL  Every 15 Minutes PRN,   Status:  Discontinued         01/13/25 0541 01/13/25 0541  glucagon (GLUCAGEN) injection 1 mg  Every 15 Minutes PRN,   Status:  Discontinued         01/13/25 0541 01/13/25 0541  Chlorhexidine Gluconate Cloth 2 % pads  Every 12 Hours PRN,   Status:  Discontinued         01/13/25 0541    Unscheduled  Check Peripheral Pulses As Needed  As Needed       01/13/25 1327    Unscheduled  Cardiac Output Parameters PRN Until 24 Hours Post-Op  As Needed       01/13/25 1327    Unscheduled  Pacemaker Settings - Initiate for Heart Rate Less Than 60 And / Or Hemodynamically Unstable  As Needed       01/13/25 1327    Unscheduled  Insert Nasogastric Tube If Indicated & Not Already in Place  As Needed      Comments: Indications: Nausea, Vomiting, Prolonged Intubation or to Administer Medications  Attach to Low Wall Suction if Any Residual    01/13/25 1327    Unscheduled  Wound Care  As Needed       01/13/25 1327    Unscheduled  Dangle at Bedside After Extubation  As Needed       01/13/25 1327    Unscheduled  Up in Chair As Tolerated After Extubation  As Needed        01/13/25 1327    Unscheduled  Blood Gas, Arterial -  As Needed      Comments: Obtain ABG as needed for ventilator changes      01/13/25 1327    Unscheduled  Begin Weaning When Criteria Met:  As Needed      Comments: If all of the weaning criteria are met, Respiratory Therapist is to initiate spontaneous breathing trial with PSV pressure support mode with PEEP 5 cmH2O if <90kg, PEEP 8 cmH2O if > or = 90kg. If criteria are not met, RT or nurse is to re-evaluate in 15-30 minutes.    01/13/25 1327    Unscheduled  Blood Gas, Arterial -  As Needed      Comments: Prior to Extubation      01/13/25 1327    Unscheduled  Oxygen Therapy- Nasal Cannula; Titrate 1-6 LPM Per SpO2; 90 - 95%  Continuous PRN       01/13/25 1327    Unscheduled  CBC & Differential  As Needed        Comments: Chest Tube Drainage Greater Than 100mL/hr      01/13/25 1327    Unscheduled  aPTT  As Needed        Comments: Chest Tube Drainage Greater Than 100mL/hr      01/13/25 1327    Unscheduled  Protime-INR  As Needed        Comments: Chest Tube Drainage Greater Than 100mL/hr      01/13/25 1327    Unscheduled  Fibrin Split Products  As Needed        Comments: Chest Tube Drainage Greater Than 100mL/hr      01/13/25 1327    Unscheduled  Fibrinogen  As Needed        Comments: Chest Tube Drainage Greater Than 100mL/hr      01/13/25 1327    Unscheduled  Treat Hypoglycemia As Recommended By Glucommander™ & Notify Provider of Treatment  As Needed      Comments: Follow Hypoglycemia Orders As Outlined in Process Instructions (Open Order Report to View Full Instructions)  Notify Provider Any Time Hypoglycemia Treatment is Administered    01/13/25 1327    Unscheduled  If Insulin Infusion is Paused - Follow Glucommander Instructions  As Needed       01/13/25 1327    Unscheduled  POC Glucose PRN  As Needed      Comments: [facility] recommended POC testing will vary between 15 minutes and 2 hours.      01/13/25 1327    Signed and Held  Insert Indwelling Urinary  Catheter  Once        Comments: Follow Protocol As Outlined in Process Instructions (Open Order Report to View Full Instructions)    Signed and Held    Signed and Held  Assess Need for Indwelling Urinary Catheter - Follow Removal Protocol  Continuous        Comments: Follow Protocol As Outlined in Process Instructions (Open Order Report to View Full Instructions)    Signed and Held    Signed and Held  Urinary Catheter Care  Every Shift       Signed and Held                  Ventilator/Non-Invasive Ventilation Settings (From admission, onward)       Start     Ordered    01/13/25 1327  Ventilator - Vent Mode: Alternate (Custom) Mode: See Comments  (Initial Vent Settings upon arrival to ICU - PAD)  Continuous,   Status:  Canceled        Comments: Initial Mechanical Ventilation Settings upon Arrival to the ICU: SIMV, Tidal Volume 5-7 mL/kg ideal body weight based on height formula, Respiratory Rate 14-16/min, PEEP 5 cmH2O if <90kg, PEEP 8 cmH2O if >90kg, FiO2 60%.   Question:  Vent Mode  Answer:  Alternate (Custom) Mode: See Comments    01/13/25 1327 01/13/25 1327  Ventilator - Vent Mode: Alternate (Custom) Mode: See Comments  (Within 30 Minutes Arrival to ICU - PAD)  Continuous        Comments: RT adjusts Mechanical Ventilation as needed to achieve: pH 7.35-7.45, pCO2 34-45 mmHg, PaO2 > 90 mmHg, SpO2 > 92%.   Question:  Vent Mode  Answer:  Alternate (Custom) Mode: See Comments    01/13/25 1327                     Operative/Procedure Notes (all)        Lam Melgar MD at 01/13/25 0819  Version 1 of 1         Patient Name:  Nemo Lazaro  YOB: 1960    Date of Procedure:  1/13/2025    Preoperative Diagnosis:   1.  Coronary artery disease of native artery of native heart with stable angina pectoris [I25.118]  2.  Diabetes mellitus, type 2- historically poorly controlled  3.  Obesity, class I  4.  Chronic tobacco use    Postoperative Diagnosis:    Same    Procedures Performed:  1. Coronary  artery bypass grafting-3 vessel (left internal mammary artery/left anterior descending, reverse saphenous vein graft/obtuse marginal, and reverse saphenous vein graft/posterior descending artery)  2. Right endoscopic vein harvest  3. Application of Prevena Incision Management    Surgeon:  Lam Melgar MD  Assistants:   Radha Sawyer MD;  Anesthesia Staff:  CRNA: Maxwell Flores CRNA; Sal Multani MD  Anesthesia Type:  General    Estimated Blood Loss:  Minimal (Cell Saver)  Drains:  1. 19 Greenlandic Dougie drain-left pleural space  2. 24 Green[place]ic Dougie drains-anterior and posterior mediastinum    Specimens:  None     Operative Findings:  Excellent arterial conduit. Good venous conduit. The LAD at site of grafting measured 1.3 mm in size and had moderate atherosclerotic disease burden. Post bypass grafting had excellent arterial runoff.  The OM artery measured 1.8 mm in size and had mild atherosclerotic disease burden.  Post bypass grafting had good arterial runoff. The posterior descending artery measured 1.5 mm in size and had excellent arterial runoff with mild-moderate atherosclerotic disease burden.  Transesophageal echocardiography salient findings include preserved left ventricular function with no significant valvular dysfunction.    \   Total aortic cross-clamp time: 79 minutes  Total cardiac bypass time: 116 minutes     Operative description in detail:  The patient was taken to the operative suite where she  was placed in a supine position.  Induction of general anesthesia and placement of a single-lumen endotracheal tube was performed without remark.  Appropriate arterial and venous access was established without remark.  Through the previously placed right internal jugular central venous line, a pulmonary artery catheter was floated into position.  The patient was then prepped and draped in the usual and sterile surgical fashion.  A timeout was performed.  Perioperative antibiotics were  administered. Beta blocker was given.    A two team approach was utilized to harvest both the left internal mammary artery and the right greater saphenous vein.   Briefly the right greater saphenous vein was taken at the level of the knee medially and taken in a prograde fashion for an anticipated length of vein to the fossa ovalis.  Blunt dissection was performed and each branch was taken using the Sentence [employer]View device.  A counter stab incision was made with both proximal and distal control obtained.  The vein was extracted from a hemostatic tunnel.  Additional vein was taken towards the ankle in a mirrored fashion. The leg was closed in a layered fashion with Vicryl suture.  The vein was prepared without remark.      While the vein was being harvested, median sternotomy was performed by me. Pericardial fat in midline from the level of the innominate vein to the level of the diaphragm was divided in midline.  A Rultract device was used to expose the posterior sternal table.  The left internal mammary artery was taken down using a standard pedicle technique with a combination of electrocautery and/or clips to control all side branches.  At that time, the patient was systemically anticoagulated with IV heparin.  A 19 Polish Dougie drain was placed in the left pleural space.  After suitable time of circulating heparin, clips were placed doubly onto the mammary artery distally and it was divided proximal to the previously placed clips.  It had excellent arterial inflow.  The mammary artery was controlled distally.  The mammary artery harvest bed was hemostatic.  The Rultract device was removed from the sterile field and a Harnett retractor was used for exposure.  The mammary artery was prepared for bypass grafting and deemed excellent.  A pericardial well was created. I elected to cannulate the heart centrally accessing distally the ascending aorta and the right atrial appendage.  Each cannula was placed in continuity with  the appropriate pump line. Retrograde autologous prime was completed as indicated.  A combined cardioplegia/aortic root vent set was secured with a horizontal mattress 4-0 Prolene suture.  With an appropriate ACT and all in readiness, cardiopulmonary bypass was commenced.  I dissected out the second obtuse marginal, PDA, and the LAD for suitable sites for bypass grafting.  The right coronary artery was severely calcified.  A first diagonal was selected with the intent to graft; however, with marked vasoactive agent requirements, decision making was made to defer grafting.  With that, I proceeded to apply the aortic cross-clamp and administered cardioplegia utilizing a warm induction strategy.  We are unable to achieve electrical mechanical arrest.  Fairly vigorous ventricular fibrillation continued to persist.  Decision making made to discontinue high potassium cardioplegia administration and released the cross-clamp per routine.  Several efforts of cardioversion directly applied as well as intravenous lidocaine x 2 doses of the 100 mg each was administered.  Bradycardia was then noted.  With all in readiness,  once again aortic cross-clamp was applied and cardioplegia was administered.  Upon achieving electrical-mechanical arrest, cold blood potassium cardioplegia was administered to a total standard dose.  We did implement systemic hypothermia mild and applied topical hypothermia to the ventricle.  At appropriate intervals, doses of cardioplegia were administered throughout the conduct of bypass grafting.  She had marked vasoplegia as well noted upon initiation of cardiopulmonary bypass requiring several pushes of Cortes-Synephrine and therefore decision making was made to start and titrate a vasopressin drip to effect.    I directed my attention towards the PDA.  A coronary arteriotomy was made and augmented to size with Torres scissors.  It is as per operative findings.  The anastomosis was constructed in an  end-to-side orientation with running 7-0 Prolene suture.  With that its proximal anastomosis was  constructed following aortotomy with 11 blade and augmented size with 4 mm arterial punch subsequently.  This was constructed in a side aorta end vein graft fashion with running 6-0 Prolene suture. An AC  was placed.  The graft was assessed for lay which was excellent. It is without tension or torsion.     To that end I directed my attention towards the second obtuse marginal.  A coronary arteriotomy was made and augmented to size with Torres scissors.  It is as per operative findings.  The anastomosis was constructed in an end-to-side orientation with running 7-0 Prolene suture.  With that its proximal anastomosis was constructed following aortotomy with 11 blade and augmented size with 4 mm arterial punch subsequently.  This was constructed in a side aorta end vein graft fashion with running 6-0 Prolene suture. An AC  was placed.  The graft was assessed for lay which was excellent.  It is without tension or torsion.     During a dose of cardioplegia, a pericardial slit left lateral was made while dividing associate pericardiophrenic fat and vasculature while being mindful of the lay of the phrenic nerve.  As such I proceeded to obtain control proximally onto the mammary artery and spatulated it distally.  I grafted this onto the LAD following coronary arteriotomy using previous described techniques.  The anastomosis was constructed in an end-to-side orientation with running 8-0 Prolene suture.  It is as per operative findings and the anastomosis was hemostatic.  I did tack the mammary artery pedicle to the anterior aspect heart with 6-0 Prolene sutures.    With that being accomplished, a terminal hotshot was administered.  The patient was placed in Trendelenburg position.  Upon completion of terminal hotshot and placement of temporary epicardial pacing wires, with the aortic vent on high and pump flows  diminished, the aortic cross-clamp was released.  With that, full support was implemented.  A perfusable rhythm spontaneously returned.  A nonworking beating phase was implemented.  Ventilation restored.  I surveyed each graft and each anastomosis was hemostatic and had excellent lay.  With all in readiness, the heart was allowed to fill.  De-airing maneuvers were performed as guided by transesophageal echocardiography.  With that the heart was decompressed and we removed the aortic root vent/cardioplegia cannula set.  Its associated pursestring suture was tied securely and this was reinforced as per matter of routine. The table was now placed in neutral position.  With all in readiness, we proceeded to wean from and separate from cardiopulmonary bypass.  I did decannulate the venous line and snared down its associated pursestring suture.  Systemic intravenous protamine was administered.  All associated blood volume was returned to the patient. With continued good hemodynamics, I decannulated the arterial line and tied down its associated pursestring sutures.  At this time I tied down the previously snared pursestring suture.  The mediastinum was drained with 24 Moldovan Dougie drains placed anteriorly and posteriorly.  I surveyed the chest and hemostasis was pristine.  With that I impregnated the sternal edges with vancomycin, thrombin, and Gelfoam paste.  The sternum was reapproximated with stainless sterile wires placed in an interrupted fashion.  In layers anatomically the soft tissue planes were reapproximated. Instruments, sharps, and sponge counts were reported as correct.  The Prevena incision management system was applied to the sternotomy incision.        Complications: None     Disposition: Transferred to ICU in stable and guarded condition.    Lam Melgar MD    Electronically signed by Lam Melgar MD at 01/13/25 5309

## 2025-01-13 NOTE — ANESTHESIA PROCEDURE NOTES
Airway  Urgency: elective    Date/Time: 1/13/2025 7:33 AM  Airway not difficult    General Information and Staff    Patient location during procedure: OR  CRNA/CAA: Maxwell Flores CRNA    Indications and Patient Condition  Indications for airway management: airway protection    Preoxygenated: yes  MILS maintained throughout  Mask difficulty assessment: 1 - vent by mask    Final Airway Details  Final airway type: endotracheal airway      Successful airway: ETT  Cuffed: yes   Successful intubation technique: video laryngoscopy  Facilitating devices/methods: intubating stylet  Endotracheal tube insertion site: oral  Blade: Kerrie  Blade size: 3  ETT size (mm): 7.0  Cormack-Lehane Classification: grade I - full view of glottis  Placement verified by: chest auscultation and capnometry   Cuff volume (mL): 8  Measured from: lips  ETT/EBT  to lips (cm): 20  Number of attempts at approach: 1

## 2025-01-13 NOTE — OP NOTE
Patient Name:  Nemo Lazaro  YOB: 1960    Date of Procedure:  1/13/2025    Preoperative Diagnosis:   1.  Coronary artery disease of native artery of native heart with stable angina pectoris [I25.118]  2.  Diabetes mellitus, type 2- historically poorly controlled  3.  Obesity, class I  4.  Chronic tobacco use    Postoperative Diagnosis:    Same    Procedures Performed:  1. Coronary artery bypass grafting-3 vessel (left internal mammary artery/left anterior descending, reverse saphenous vein graft/obtuse marginal, and reverse saphenous vein graft/posterior descending artery)  2. Right endoscopic vein harvest  3. Application of Prevena Incision Management    Surgeon:  Lam Melgar MD  Assistants:   Radha Sawyer MD;  Anesthesia Staff:  CRNA: Maxwell Flores CRNA; Sal Multani MD  Anesthesia Type:  General    Estimated Blood Loss:  Minimal (Cell Saver)  Drains:  1. 19 [place]an Dougie drain-left pleural space  2. 24 [place]an Dougie drains-anterior and posterior mediastinum    Specimens:  None     Operative Findings:  Excellent arterial conduit. Good venous conduit. The LAD at site of grafting measured 1.3 mm in size and had moderate atherosclerotic disease burden. Post bypass grafting had excellent arterial runoff.  The OM artery measured 1.8 mm in size and had mild atherosclerotic disease burden.  Post bypass grafting had good arterial runoff. The posterior descending artery measured 1.5 mm in size and had excellent arterial runoff with mild-moderate atherosclerotic disease burden.  Transesophageal echocardiography salient findings include preserved left ventricular function with no significant valvular dysfunction.    \   Total aortic cross-clamp time: 79 minutes  Total cardiac bypass time: 116 minutes     Operative description in detail:  The patient was taken to the operative suite where she  was placed in a supine position.  Induction of general anesthesia and placement of a  single-lumen endotracheal tube was performed without remark.  Appropriate arterial and venous access was established without remark.  Through the previously placed right internal jugular central venous line, a pulmonary artery catheter was floated into position.  The patient was then prepped and draped in the usual and sterile surgical fashion.  A timeout was performed.  Perioperative antibiotics were administered. Beta blocker was given.    A two team approach was utilized to harvest both the left internal mammary artery and the right greater saphenous vein.   Briefly the right greater saphenous vein was taken at the level of the knee medially and taken in a prograde fashion for an anticipated length of vein to the fossa ovalis.  Blunt dissection was performed and each branch was taken using the Treatful device.  A counter stab incision was made with both proximal and distal control obtained.  The vein was extracted from a hemostatic tunnel.  Additional vein was taken towards the ankle in a mirrored fashion. The leg was closed in a layered fashion with Vicryl suture.  The vein was prepared without remark.      While the vein was being harvested, median sternotomy was performed by me. Pericardial fat in midline from the level of the innominate vein to the level of the diaphragm was divided in midline.  A Rultract device was used to expose the posterior sternal table.  The left internal mammary artery was taken down using a standard pedicle technique with a combination of electrocautery and/or clips to control all side branches.  At that time, the patient was systemically anticoagulated with IV heparin.  A 19 Frisian Dougie drain was placed in the left pleural space.  After suitable time of circulating heparin, clips were placed doubly onto the mammary artery distally and it was divided proximal to the previously placed clips.  It had excellent arterial inflow.  The mammary artery was controlled distally.  The mammary  artery harvest bed was hemostatic.  The Rultract device was removed from the sterile field and a Hand retractor was used for exposure.  The mammary artery was prepared for bypass grafting and deemed excellent.  A pericardial well was created. I elected to cannulate the heart centrally accessing distally the ascending aorta and the right atrial appendage.  Each cannula was placed in continuity with the appropriate pump line. Retrograde autologous prime was completed as indicated.  A combined cardioplegia/aortic root vent set was secured with a horizontal mattress 4-0 Prolene suture.  With an appropriate ACT and all in readiness, cardiopulmonary bypass was commenced.  I dissected out the second obtuse marginal, PDA, and the LAD for suitable sites for bypass grafting.  The right coronary artery was severely calcified.  A first diagonal was selected with the intent to graft; however, with marked vasoactive agent requirements, decision making was made to defer grafting.  With that, I proceeded to apply the aortic cross-clamp and administered cardioplegia utilizing a warm induction strategy.  We are unable to achieve electrical mechanical arrest.  Fairly vigorous ventricular fibrillation continued to persist.  Decision making made to discontinue high potassium cardioplegia administration and released the cross-clamp per routine.  Several efforts of cardioversion directly applied as well as intravenous lidocaine x 2 doses of the 100 mg each was administered.  Bradycardia was then noted.  With all in readiness,  once again aortic cross-clamp was applied and cardioplegia was administered.  Upon achieving electrical-mechanical arrest, cold blood potassium cardioplegia was administered to a total standard dose.  We did implement systemic hypothermia mild and applied topical hypothermia to the ventricle.  At appropriate intervals, doses of cardioplegia were administered throughout the conduct of bypass grafting.  She had  marked vasoplegia as well noted upon initiation of cardiopulmonary bypass requiring several pushes of Corets-Synephrine and therefore decision making was made to start and titrate a vasopressin drip to effect.    I directed my attention towards the PDA.  A coronary arteriotomy was made and augmented to size with Torres scissors.  It is as per operative findings.  The anastomosis was constructed in an end-to-side orientation with running 7-0 Prolene suture.  With that its proximal anastomosis was  constructed following aortotomy with 11 blade and augmented size with 4 mm arterial punch subsequently.  This was constructed in a side aorta end vein graft fashion with running 6-0 Prolene suture. An AC  was placed.  The graft was assessed for lay which was excellent. It is without tension or torsion.     To that end I directed my attention towards the second obtuse marginal.  A coronary arteriotomy was made and augmented to size with Torres scissors.  It is as per operative findings.  The anastomosis was constructed in an end-to-side orientation with running 7-0 Prolene suture.  With that its proximal anastomosis was constructed following aortotomy with 11 blade and augmented size with 4 mm arterial punch subsequently.  This was constructed in a side aorta end vein graft fashion with running 6-0 Prolene suture. An AC  was placed.  The graft was assessed for lay which was excellent.  It is without tension or torsion.     During a dose of cardioplegia, a pericardial slit left lateral was made while dividing associate pericardiophrenic fat and vasculature while being mindful of the lay of the phrenic nerve.  As such I proceeded to obtain control proximally onto the mammary artery and spatulated it distally.  I grafted this onto the LAD following coronary arteriotomy using previous described techniques.  The anastomosis was constructed in an end-to-side orientation with running 8-0 Prolene suture.  It is as per  operative findings and the anastomosis was hemostatic.  I did tack the mammary artery pedicle to the anterior aspect heart with 6-0 Prolene sutures.    With that being accomplished, a terminal hotshot was administered.  The patient was placed in Trendelenburg position.  Upon completion of terminal hotshot and placement of temporary epicardial pacing wires, with the aortic vent on high and pump flows diminished, the aortic cross-clamp was released.  With that, full support was implemented.  A perfusable rhythm spontaneously returned.  A nonworking beating phase was implemented.  Ventilation restored.  I surveyed each graft and each anastomosis was hemostatic and had excellent lay.  With all in readiness, the heart was allowed to fill.  De-airing maneuvers were performed as guided by transesophageal echocardiography.  With that the heart was decompressed and we removed the aortic root vent/cardioplegia cannula set.  Its associated pursestring suture was tied securely and this was reinforced as per matter of routine. The table was now placed in neutral position.  With all in readiness, we proceeded to wean from and separate from cardiopulmonary bypass.  I did decannulate the venous line and snared down its associated pursestring suture.  Systemic intravenous protamine was administered.  All associated blood volume was returned to the patient. With continued good hemodynamics, I decannulated the arterial line and tied down its associated pursestring sutures.  At this time I tied down the previously snared pursestring suture.  The mediastinum was drained with 24 Maori Dougie drains placed anteriorly and posteriorly.  I surveyed the chest and hemostasis was pristine.  With that I impregnated the sternal edges with vancomycin, thrombin, and Gelfoam paste.  The sternum was reapproximated with stainless sterile wires placed in an interrupted fashion.  In layers anatomically the soft tissue planes were reapproximated.  Instruments, sharps, and sponge counts were reported as correct.  The Prevena incision management system was applied to the sternotomy incision.        Complications: None     Disposition: Transferred to ICU in stable and guarded condition.    Lam Melgar MD

## 2025-01-13 NOTE — ANESTHESIA PROCEDURE NOTES
Arterial Line    Pre-sedation assessment completed: 1/13/2025 6:40 AM    Patient reassessed immediately prior to procedure    Patient location during procedure: holding area  Start time: 1/13/2025 6:41 AM  Stop Time:1/13/2025 6:42 AM       Line placed for hemodynamic monitoring and ABGs/Labs/ISTAT.  Performed By   CRNA/CAA: Maxwell Flores, CRNA   Preanesthetic Checklist  Completed: patient identified, IV checked, site marked, risks and benefits discussed, surgical consent, monitors and equipment checked, pre-op evaluation and timeout performed  Arterial Line Prep    Sterile Tech: cap, gloves, sterile barriers and mask  Prep: ChloraPrep  Patient monitoring: EKG, continuous pulse oximetry and blood pressure monitoring  Arterial Line Procedure   Laterality:left  Location:  radial artery  Catheter size: 20 G   Guidance: ultrasound guided  PROCEDURE NOTE/ULTRASOUND INTERPRETATION.  Using ultrasound guidance the potential vascular sites for insertion of the catheter were visualized to determine the patency of the vessel to be used for vascular access.  After selecting the appropriate site for insertion, the needle was visualized under ultrasound being inserted into the radial artery, followed by ultrasound confirmation of wire and catheter placement. There were no abnormalities seen on ultrasound; an image was taken; and the patient tolerated the procedure with no complications.   Number of attempts: 1  Successful placement: yes   Post Assessment   Dressing Type: wrist guard applied, secured with tape and occlusive dressing applied.   Complications no  Circ/Move/Sens Assessment: normal and unchanged.   Patient Tolerance: patient tolerated the procedure well with no apparent complications

## 2025-01-13 NOTE — H&P
Chief Complaint   Patient presents with    Coronary Artery Disease       New patient from Dr Wang               Subjective  History of Present Illness  The patient is a 64-year-old female with a history of diabetes mellitus, hypertension, hyperlipidemia, anxiety, chronic tobacco use, obesity, and a sedentary lifestyle. She is here today for the consideration of coronary artery bypass grafting. She is under the care of Dr. Buenrostro and Dr. Wang. She is accompanied by her .     She has a history of dyspnea and has been compliant with her CPAP use for obstructive sleep apnea. A work-up was performed, revealing multivessel coronary artery disease. She experiences shortness of breath and occasional chest tightness, both at rest and during physical activity. Her exercise routine has decreased. She recalls an incident 3 or 4 years ago when she blacked out completely, resulting in a broken foot. This incident discouraged her from exercising. She underwent a heart catheterization 4 years ago.     She expresses fear about undergoing open heart surgery. Despite her strong family history of heart disease, she continues to smoke, finding it difficult to quit. She has been losing weight but acknowledges that she is still overweight for her age and bone size. She reports feeling sleepy and lacking energy. She also notes that her right leg swells more than her left.     She has severe sinusitis and has not felt well for the past 3 or 4 years.     She is on Zyrtec for allergies.     She was delayed due to hyponatremia.  This was corrected and due to medicines.      No  change other wise.      FAMILY HISTORY  Her father had open heart surgery in 07/2024. Her mother had a triple bypass at the age of 82.     Review of Systems         Medical History        Past Medical History:   Diagnosis Date    Anxiety      Arthritis       HIPS, KNEES    Bowel incontinence       pt states sometimes    Cataracts, bilateral      Coronary artery  disease      Depression      Diabetes mellitus       type 2    Diverticulosis      GERD (gastroesophageal reflux disease)      Hyperlipidemia      Hypertension      Kidney stones      Shortness of breath      Sleep apnea       PT USES A CPAP MACHINE    Urinary frequency       AND URGENCY         Surgical History         Past Surgical History:   Procedure Laterality Date    APPENDECTOMY        CARDIAC CATHETERIZATION N/A 10/23/2020     Procedure: Cardiac Catheterization/Vascular Study;  Surgeon: Devante Wang MD;  Location:  [facility];  Service: Cardiology;  Laterality: N/A;    CARDIAC CATHETERIZATION Left 10/23/2024     Procedure: Cardiac Catheterization/Vascular Study;  Surgeon: Devante Wang MD;  Location:  [facility];  Service: Cardiology;  Laterality: Left;     SECTION         x2    CHOLECYSTECTOMY        HYSTERECTOMY             History reviewed. No pertinent family history.  Social History   Social History            Tobacco Use    Smoking status: Former       Current packs/day: 0.00       Average packs/day: 1 pack/day for 57.8 years (57.8 ttl pk-yrs)       Types: Cigarettes       Start date:        Quit date: 2024       Years since quittin.0    Smokeless tobacco: Never   Vaping Use    Vaping status: Never Used   Substance Use Topics    Alcohol use: Not Currently    Drug use: Never         Current Medications          Current Outpatient Medications   Medication Sig Dispense Refill    Alogliptin-metFORMIN HCl (Kazano) 12.5-500 MG tablet Take 1 tablet by mouth 2 (two) times a day.        aspirin 81 MG chewable tablet Chew 1 tablet Daily.        busPIRone (BUSPAR) 5 MG tablet Take 1 tablet by mouth Every Night.        citalopram (CeleXA) 40 MG tablet Take 1 tablet by mouth Daily.        isosorbide mononitrate (IMDUR) 30 MG 24 hr tablet Take 1 tablet by mouth Daily.        Levemir FlexPen 100 UNIT/ML injection Inject 65 Units under the skin into the  "appropriate area as directed 2 (Two) Times a Day.        levocetirizine (XYZAL) 5 MG tablet Take 1 tablet by mouth Daily.        lisinopril-hydrochlorothiazide (PRINZIDE,ZESTORETIC) 10-12.5 MG per tablet Take 1 tablet by mouth Daily.        metFORMIN (GLUCOPHAGE) 500 MG tablet Take 1 tablet by mouth Every 12 (Twelve) Hours.        omeprazole (priLOSEC) 40 MG capsule Take 1 capsule by mouth Daily.        Ozempic, 0.25 or 0.5 MG/DOSE, 2 MG/3ML solution pen-injector INJECT 0.25MG SUBCUTANEOUSLY ONCE A WEEK (0.25 MG)        pravastatin (PRAVACHOL) 40 MG tablet Take 1 tablet by mouth Daily.        mupirocin (BACTROBAN) 2 % nasal ointment Administer 1 Application into the nostril(s) as directed by provider See Admin Instructions. 1 each 0      No current facility-administered medications for this visit.         Allergies:  Penicillins        Objective  Visit Vitals  /80 (BP Location: Left arm, Patient Position: Sitting)   Pulse 66   Temp 97 °F (36.1 °C) (Temporal)   Resp 18   Ht 163.4 cm (64.33\")   Wt 82.5 kg (181 lb 14.1 oz)   SpO2 96%   BMI 30.90 kg/m²          Physical Exam  Physical Exam  Patient is in no acute distress, appropriate.  Lungs are clear to auscultation bilaterally without wheezing, rubs, or rales.  Heart has a regular rate and rhythm without murmurs, rubs, or gallops.  Abdomen is soft, nondistended, nontender.  No clubbing or cyanosis in extremities, but there is right greater than left edema. Notable swelling in the right leg.     Results Review:   CT Angiogram Chest        Results  Laboratory Studies  A1c is 7.9.     Imaging  Multivessel coronary artery disease was found in the patient's work-up.  I reviewed the patient's new clinical results.  Discussed with patient               Assessment & Plan  Diagnoses and all orders for this visit:     1. Coronary artery disease of native artery of native heart with stable angina pectoris (Primary)  -     US Carotid Bilateral; Future  -     CT angiogram " chest w contrast; Future  -     Complete PFT - Pre & Post Bronchodilator; Future  -     Blood Gas, Arterial -; Future  -     Adult Transthoracic Echo Complete W/ Cont if Necessary Per Protocol; Future  -     US Vein Mapping Bilateral; Future  -     Hemoglobin A1c; Future     2. Type 2 diabetes mellitus with hyperglycemia, unspecified whether long term insulin use  -     Hemoglobin A1c; Future        Assessment & Plan  1. Multivessel Coronary Artery Disease.  She was informed about her multivessel coronary artery disease. Her angiography was reviewed, and treatment options including medical management, PCI, and surgical revascularization were discussed. The pros and cons of each option were considered, and it was agreed that surgical revascularization via coronary artery bypass grafting would be the best course of action. The operative context, expected postoperative course, and operative risks were discussed, including bleeding, infection, stroke, heart attack, need for additional procedures, anesthesia risk, organ dysfunction and/or failure, prolonged ICU stay, prolonged mechanical ventilation, chronic pain syndromes, sternal nonunion, and/or death. Should she experience any increase in dyspnea or chest tightness, she is to contact the office for possible expedited inpatient workup. Her workup will include bilateral carotid ultrasound, CTA chest, 2D complete echocardiography, pre and post bronchodilator PFTs with DLCO, and bilateral vein mapping. Given their distance from the hospital and limited resources, all testing will be attempted on Monday, with any remaining tests to be conducted the day before surgery.  STS risk analysis was used as the basis of risk discussion.       2. Diabetes Mellitus.  Her most recent A1c was 7.9, a significant improvement from previous levels of 10 to 11 percent. The importance of tight glycemic control was emphasized. Weight loss and other lifestyle risk factor modifications were  also discussed.     3. Chronic Tobacco Use.  The importance of smoking cessation for overall health and graft patency was emphasized. She expressed high motivation to quit smoking immediately and was encouraged to succeed in this endeavor.     4. Hypertension.  Her hypertension was noted as part of her medical history. The importance of managing blood pressure was discussed.     5. Hyperlipidemia.  Her hyperlipidemia was noted as part of her medical history. The importance of managing cholesterol levels was discussed.     6. Obesity Class I.  Her weight loss efforts were acknowledged, and she was congratulated on losing 38 pounds. Despite an ankle foot fracture, she is able to mobilize without difficulty and stood up without using her arms during the office visit. The importance of continued weight loss and lifestyle modifications was discussed.           Many thanks for the opportunity to care for your patient.     I will continue to keep you apprised of provided care as it ensues.        No change in history other than as detailed above.

## 2025-01-14 ENCOUNTER — APPOINTMENT (OUTPATIENT)
Dept: GENERAL RADIOLOGY | Facility: HOSPITAL | Age: 65
DRG: 236 | End: 2025-01-14
Payer: COMMERCIAL

## 2025-01-14 ENCOUNTER — APPOINTMENT (OUTPATIENT)
Dept: CT IMAGING | Facility: HOSPITAL | Age: 65
DRG: 236 | End: 2025-01-14
Payer: COMMERCIAL

## 2025-01-14 LAB
ALBUMIN SERPL-MCNC: 4 G/DL (ref 3.5–5.2)
ANION GAP SERPL CALCULATED.3IONS-SCNC: 10 MMOL/L (ref 5–15)
ANION GAP SERPL CALCULATED.3IONS-SCNC: 12 MMOL/L (ref 5–15)
BASOPHILS # BLD AUTO: 0.02 10*3/MM3 (ref 0–0.2)
BASOPHILS NFR BLD AUTO: 0.2 % (ref 0–1.5)
BH BB BLOOD EXPIRATION DATE: NORMAL
BH BB BLOOD EXPIRATION DATE: NORMAL
BH BB BLOOD TYPE BARCODE: 5100
BH BB BLOOD TYPE BARCODE: 5100
BH BB DISPENSE STATUS: NORMAL
BH BB DISPENSE STATUS: NORMAL
BH BB PRODUCT CODE: NORMAL
BH BB PRODUCT CODE: NORMAL
BH BB UNIT NUMBER: NORMAL
BH BB UNIT NUMBER: NORMAL
BUN SERPL-MCNC: 16 MG/DL (ref 8–23)
BUN SERPL-MCNC: 17 MG/DL (ref 8–23)
BUN/CREAT SERPL: 18.4 (ref 7–25)
BUN/CREAT SERPL: 18.9 (ref 7–25)
CALCIUM SPEC-SCNC: 8.7 MG/DL (ref 8.6–10.5)
CALCIUM SPEC-SCNC: 8.8 MG/DL (ref 8.6–10.5)
CHLORIDE SERPL-SCNC: 102 MMOL/L (ref 98–107)
CHLORIDE SERPL-SCNC: 103 MMOL/L (ref 98–107)
CO2 SERPL-SCNC: 19 MMOL/L (ref 22–29)
CO2 SERPL-SCNC: 20 MMOL/L (ref 22–29)
CREAT SERPL-MCNC: 0.87 MG/DL (ref 0.57–1)
CREAT SERPL-MCNC: 0.9 MG/DL (ref 0.57–1)
CROSSMATCH INTERPRETATION: NORMAL
CROSSMATCH INTERPRETATION: NORMAL
D-LACTATE SERPL-SCNC: 1.1 MMOL/L (ref 0.5–2)
D-LACTATE SERPL-SCNC: 1.7 MMOL/L (ref 0.5–2)
D-LACTATE SERPL-SCNC: 2.1 MMOL/L (ref 0.5–2)
DEPRECATED RDW RBC AUTO: 46.4 FL (ref 37–54)
DEPRECATED RDW RBC AUTO: 47 FL (ref 37–54)
EGFRCR SERPLBLD CKD-EPI 2021: 71.5 ML/MIN/1.73
EGFRCR SERPLBLD CKD-EPI 2021: 74.5 ML/MIN/1.73
EOSINOPHIL # BLD AUTO: 0.01 10*3/MM3 (ref 0–0.4)
EOSINOPHIL NFR BLD AUTO: 0.1 % (ref 0.3–6.2)
ERYTHROCYTE [DISTWIDTH] IN BLOOD BY AUTOMATED COUNT: 13.5 % (ref 12.3–15.4)
ERYTHROCYTE [DISTWIDTH] IN BLOOD BY AUTOMATED COUNT: 13.8 % (ref 12.3–15.4)
GLUCOSE BLDC GLUCOMTR-MCNC: 111 MG/DL (ref 70–130)
GLUCOSE BLDC GLUCOMTR-MCNC: 116 MG/DL (ref 70–130)
GLUCOSE BLDC GLUCOMTR-MCNC: 117 MG/DL (ref 70–130)
GLUCOSE BLDC GLUCOMTR-MCNC: 122 MG/DL (ref 70–130)
GLUCOSE BLDC GLUCOMTR-MCNC: 126 MG/DL (ref 70–130)
GLUCOSE BLDC GLUCOMTR-MCNC: 129 MG/DL (ref 70–130)
GLUCOSE BLDC GLUCOMTR-MCNC: 146 MG/DL (ref 70–130)
GLUCOSE BLDC GLUCOMTR-MCNC: 146 MG/DL (ref 70–130)
GLUCOSE BLDC GLUCOMTR-MCNC: 148 MG/DL (ref 70–130)
GLUCOSE BLDC GLUCOMTR-MCNC: 157 MG/DL (ref 70–130)
GLUCOSE BLDC GLUCOMTR-MCNC: 176 MG/DL (ref 70–130)
GLUCOSE BLDC GLUCOMTR-MCNC: 176 MG/DL (ref 70–130)
GLUCOSE BLDC GLUCOMTR-MCNC: 185 MG/DL (ref 70–130)
GLUCOSE BLDC GLUCOMTR-MCNC: 218 MG/DL (ref 70–130)
GLUCOSE BLDC GLUCOMTR-MCNC: 230 MG/DL (ref 70–130)
GLUCOSE BLDC GLUCOMTR-MCNC: 232 MG/DL (ref 70–130)
GLUCOSE BLDC GLUCOMTR-MCNC: 233 MG/DL (ref 70–130)
GLUCOSE BLDC GLUCOMTR-MCNC: 242 MG/DL (ref 70–130)
GLUCOSE SERPL-MCNC: 103 MG/DL (ref 65–99)
GLUCOSE SERPL-MCNC: 159 MG/DL (ref 65–99)
HCT VFR BLD AUTO: 26 % (ref 34–46.6)
HCT VFR BLD AUTO: 26.8 % (ref 34–46.6)
HGB BLD-MCNC: 8.7 G/DL (ref 12–15.9)
HGB BLD-MCNC: 9 G/DL (ref 12–15.9)
IMM GRANULOCYTES # BLD AUTO: 0.08 10*3/MM3 (ref 0–0.05)
IMM GRANULOCYTES NFR BLD AUTO: 0.6 % (ref 0–0.5)
INR PPP: 1.17 (ref 0.91–1.09)
LYMPHOCYTES # BLD AUTO: 0.77 10*3/MM3 (ref 0.7–3.1)
LYMPHOCYTES NFR BLD AUTO: 6 % (ref 19.6–45.3)
MAGNESIUM SERPL-MCNC: 1.8 MG/DL (ref 1.6–2.4)
MAGNESIUM SERPL-MCNC: 2.5 MG/DL (ref 1.6–2.4)
MCH RBC QN AUTO: 31 PG (ref 26.6–33)
MCH RBC QN AUTO: 31.1 PG (ref 26.6–33)
MCHC RBC AUTO-ENTMCNC: 33.5 G/DL (ref 31.5–35.7)
MCHC RBC AUTO-ENTMCNC: 33.6 G/DL (ref 31.5–35.7)
MCV RBC AUTO: 92.5 FL (ref 79–97)
MCV RBC AUTO: 92.7 FL (ref 79–97)
MONOCYTES # BLD AUTO: 0.47 10*3/MM3 (ref 0.1–0.9)
MONOCYTES NFR BLD AUTO: 3.7 % (ref 5–12)
NEUTROPHILS NFR BLD AUTO: 11.47 10*3/MM3 (ref 1.7–7)
NEUTROPHILS NFR BLD AUTO: 89.4 % (ref 42.7–76)
PHOSPHATE SERPL-MCNC: 3.4 MG/DL (ref 2.5–4.5)
PHOSPHATE SERPL-MCNC: 3.9 MG/DL (ref 2.5–4.5)
PLATELET # BLD AUTO: 106 10*3/MM3 (ref 140–450)
PLATELET # BLD AUTO: 107 10*3/MM3 (ref 140–450)
PMV BLD AUTO: 11.4 FL (ref 6–12)
PMV BLD AUTO: 11.6 FL (ref 6–12)
POTASSIUM SERPL-SCNC: 4.2 MMOL/L (ref 3.5–5.2)
POTASSIUM SERPL-SCNC: 4.3 MMOL/L (ref 3.5–5.2)
PROTHROMBIN TIME: 15.5 SECONDS (ref 11.8–14.8)
QT INTERVAL: 438 MS
QT INTERVAL: 446 MS
QTC INTERVAL: 448 MS
QTC INTERVAL: 491 MS
RBC # BLD AUTO: 2.81 10*6/MM3 (ref 3.77–5.28)
RBC # BLD AUTO: 2.89 10*6/MM3 (ref 3.77–5.28)
SODIUM SERPL-SCNC: 133 MMOL/L (ref 136–145)
SODIUM SERPL-SCNC: 133 MMOL/L (ref 136–145)
UNIT  ABO: NORMAL
UNIT  ABO: NORMAL
UNIT  RH: NORMAL
UNIT  RH: NORMAL
WBC NRBC COR # BLD AUTO: 12.82 10*3/MM3 (ref 3.4–10.8)
WBC NRBC COR # BLD AUTO: 14.31 10*3/MM3 (ref 3.4–10.8)

## 2025-01-14 PROCEDURE — 93010 ELECTROCARDIOGRAM REPORT: CPT | Performed by: EMERGENCY MEDICINE

## 2025-01-14 PROCEDURE — 25010000002 CEFAZOLIN PER 500 MG: Performed by: THORACIC SURGERY (CARDIOTHORACIC VASCULAR SURGERY)

## 2025-01-14 PROCEDURE — 25010000002 NICARDIPINE HCL IN NACL 20-0.9 MG/200ML-% SOLUTION: Performed by: THORACIC SURGERY (CARDIOTHORACIC VASCULAR SURGERY)

## 2025-01-14 PROCEDURE — 74176 CT ABD & PELVIS W/O CONTRAST: CPT

## 2025-01-14 PROCEDURE — 97162 PT EVAL MOD COMPLEX 30 MIN: CPT

## 2025-01-14 PROCEDURE — 97116 GAIT TRAINING THERAPY: CPT

## 2025-01-14 PROCEDURE — 85610 PROTHROMBIN TIME: CPT | Performed by: THORACIC SURGERY (CARDIOTHORACIC VASCULAR SURGERY)

## 2025-01-14 PROCEDURE — 94799 UNLISTED PULMONARY SVC/PX: CPT

## 2025-01-14 PROCEDURE — 25010000002 FENTANYL CITRATE (PF) 50 MCG/ML SOLUTION: Performed by: THORACIC SURGERY (CARDIOTHORACIC VASCULAR SURGERY)

## 2025-01-14 PROCEDURE — 25010000002 MAGNESIUM SULFATE 4 GM/100ML SOLUTION: Performed by: THORACIC SURGERY (CARDIOTHORACIC VASCULAR SURGERY)

## 2025-01-14 PROCEDURE — 63710000001 INSULIN GLARGINE PER 5 UNITS: Performed by: THORACIC SURGERY (CARDIOTHORACIC VASCULAR SURGERY)

## 2025-01-14 PROCEDURE — 80048 BASIC METABOLIC PNL TOTAL CA: CPT | Performed by: NURSE PRACTITIONER

## 2025-01-14 PROCEDURE — 80069 RENAL FUNCTION PANEL: CPT | Performed by: THORACIC SURGERY (CARDIOTHORACIC VASCULAR SURGERY)

## 2025-01-14 PROCEDURE — 83605 ASSAY OF LACTIC ACID: CPT | Performed by: THORACIC SURGERY (CARDIOTHORACIC VASCULAR SURGERY)

## 2025-01-14 PROCEDURE — 85027 COMPLETE CBC AUTOMATED: CPT | Performed by: NURSE PRACTITIONER

## 2025-01-14 PROCEDURE — 25010000002 ACETAMINOPHEN 10 MG/ML SOLUTION: Performed by: THORACIC SURGERY (CARDIOTHORACIC VASCULAR SURGERY)

## 2025-01-14 PROCEDURE — 84100 ASSAY OF PHOSPHORUS: CPT | Performed by: THORACIC SURGERY (CARDIOTHORACIC VASCULAR SURGERY)

## 2025-01-14 PROCEDURE — 93005 ELECTROCARDIOGRAM TRACING: CPT | Performed by: THORACIC SURGERY (CARDIOTHORACIC VASCULAR SURGERY)

## 2025-01-14 PROCEDURE — 63710000001 INSULIN LISPRO (HUMAN) PER 5 UNITS: Performed by: THORACIC SURGERY (CARDIOTHORACIC VASCULAR SURGERY)

## 2025-01-14 PROCEDURE — 83735 ASSAY OF MAGNESIUM: CPT | Performed by: THORACIC SURGERY (CARDIOTHORACIC VASCULAR SURGERY)

## 2025-01-14 PROCEDURE — 85025 COMPLETE CBC W/AUTO DIFF WBC: CPT | Performed by: THORACIC SURGERY (CARDIOTHORACIC VASCULAR SURGERY)

## 2025-01-14 PROCEDURE — 25010000002 ENOXAPARIN PER 10 MG: Performed by: THORACIC SURGERY (CARDIOTHORACIC VASCULAR SURGERY)

## 2025-01-14 PROCEDURE — 71045 X-RAY EXAM CHEST 1 VIEW: CPT

## 2025-01-14 PROCEDURE — 82948 REAGENT STRIP/BLOOD GLUCOSE: CPT

## 2025-01-14 PROCEDURE — 83605 ASSAY OF LACTIC ACID: CPT | Performed by: NURSE PRACTITIONER

## 2025-01-14 RX ORDER — DEXTROSE MONOHYDRATE 25 G/50ML
25 INJECTION, SOLUTION INTRAVENOUS
Status: DISCONTINUED | OUTPATIENT
Start: 2025-01-14 | End: 2025-01-18 | Stop reason: HOSPADM

## 2025-01-14 RX ORDER — AMIODARONE HYDROCHLORIDE 200 MG/1
200 TABLET ORAL 2 TIMES DAILY WITH MEALS
Status: DISCONTINUED | OUTPATIENT
Start: 2025-01-15 | End: 2025-01-18 | Stop reason: HOSPADM

## 2025-01-14 RX ORDER — INSULIN LISPRO 100 [IU]/ML
2-9 INJECTION, SOLUTION INTRAVENOUS; SUBCUTANEOUS
Status: DISCONTINUED | OUTPATIENT
Start: 2025-01-14 | End: 2025-01-18 | Stop reason: HOSPADM

## 2025-01-14 RX ORDER — NICOTINE POLACRILEX 4 MG
15 LOZENGE BUCCAL
Status: DISCONTINUED | OUTPATIENT
Start: 2025-01-14 | End: 2025-01-18 | Stop reason: HOSPADM

## 2025-01-14 RX ORDER — INSULIN GLARGINE 100 [IU]/ML
65 INJECTION, SOLUTION SUBCUTANEOUS 2 TIMES DAILY
COMMUNITY

## 2025-01-14 RX ORDER — MAGNESIUM SULFATE HEPTAHYDRATE 40 MG/ML
4 INJECTION, SOLUTION INTRAVENOUS ONCE
Status: COMPLETED | OUTPATIENT
Start: 2025-01-14 | End: 2025-01-14

## 2025-01-14 RX ORDER — IBUPROFEN 600 MG/1
1 TABLET ORAL
Status: DISCONTINUED | OUTPATIENT
Start: 2025-01-14 | End: 2025-01-18 | Stop reason: HOSPADM

## 2025-01-14 RX ADMIN — PREGABALIN 25 MG: 25 CAPSULE ORAL at 20:11

## 2025-01-14 RX ADMIN — BISACODYL 10 MG: 5 TABLET, COATED ORAL at 20:11

## 2025-01-14 RX ADMIN — OXYCODONE HYDROCHLORIDE 5 MG: 5 TABLET ORAL at 13:27

## 2025-01-14 RX ADMIN — IPRATROPIUM BROMIDE AND ALBUTEROL SULFATE 1.5 ML: 2.5; .5 SOLUTION RESPIRATORY (INHALATION) at 19:55

## 2025-01-14 RX ADMIN — METOPROLOL TARTRATE 12.5 MG: 25 TABLET, FILM COATED ORAL at 09:45

## 2025-01-14 RX ADMIN — MAGNESIUM SULFATE HEPTAHYDRATE 4 G: 40 INJECTION, SOLUTION INTRAVENOUS at 03:40

## 2025-01-14 RX ADMIN — BISACODYL 10 MG: 5 TABLET, COATED ORAL at 09:42

## 2025-01-14 RX ADMIN — Medication 1 APPLICATION: at 17:01

## 2025-01-14 RX ADMIN — INSULIN GLARGINE 5 UNITS: 100 INJECTION, SOLUTION SUBCUTANEOUS at 19:58

## 2025-01-14 RX ADMIN — CLOPIDOGREL BISULFATE 75 MG: 75 TABLET ORAL at 17:01

## 2025-01-14 RX ADMIN — CEFAZOLIN 2 G: 2 INJECTION, POWDER, FOR SOLUTION INTRAMUSCULAR; INTRAVENOUS at 16:09

## 2025-01-14 RX ADMIN — OXYCODONE HYDROCHLORIDE 5 MG: 5 TABLET ORAL at 19:33

## 2025-01-14 RX ADMIN — CITALOPRAM HYDROBROMIDE 10 MG: 20 TABLET ORAL at 09:43

## 2025-01-14 RX ADMIN — OXYCODONE HYDROCHLORIDE 10 MG: 10 TABLET ORAL at 02:14

## 2025-01-14 RX ADMIN — CEFAZOLIN 2 G: 2 INJECTION, POWDER, FOR SOLUTION INTRAMUSCULAR; INTRAVENOUS at 09:54

## 2025-01-14 RX ADMIN — ACETAMINOPHEN 1000 MG: 10 INJECTION, SOLUTION INTRAVENOUS at 05:13

## 2025-01-14 RX ADMIN — Medication 1 APPLICATION: at 05:13

## 2025-01-14 RX ADMIN — PREGABALIN 25 MG: 25 CAPSULE ORAL at 09:45

## 2025-01-14 RX ADMIN — FENTANYL CITRATE 25 MCG: 50 INJECTION, SOLUTION INTRAMUSCULAR; INTRAVENOUS at 08:52

## 2025-01-14 RX ADMIN — IPRATROPIUM BROMIDE AND ALBUTEROL SULFATE 1.5 ML: 2.5; .5 SOLUTION RESPIRATORY (INHALATION) at 11:10

## 2025-01-14 RX ADMIN — NICARDIPINE HYDROCHLORIDE 5 MG/HR: 0.1 INJECTION INTRAVENOUS at 18:08

## 2025-01-14 RX ADMIN — PANTOPRAZOLE SODIUM 40 MG: 40 TABLET, DELAYED RELEASE ORAL at 05:13

## 2025-01-14 RX ADMIN — ENOXAPARIN SODIUM 40 MG: 100 INJECTION SUBCUTANEOUS at 10:48

## 2025-01-14 RX ADMIN — CHLORHEXIDINE GLUCONATE 1 APPLICATION: 500 CLOTH TOPICAL at 03:40

## 2025-01-14 RX ADMIN — IPRATROPIUM BROMIDE AND ALBUTEROL SULFATE 1.5 ML: 2.5; .5 SOLUTION RESPIRATORY (INHALATION) at 06:52

## 2025-01-14 RX ADMIN — ATORVASTATIN CALCIUM 20 MG: 10 TABLET, FILM COATED ORAL at 20:11

## 2025-01-14 RX ADMIN — CHLORHEXIDINE GLUCONATE 0.12% ORAL RINSE 15 ML: 1.2 LIQUID ORAL at 05:13

## 2025-01-14 RX ADMIN — AMIODARONE HYDROCHLORIDE 400 MG: 200 TABLET ORAL at 09:42

## 2025-01-14 RX ADMIN — CHLORHEXIDINE GLUCONATE 0.12% ORAL RINSE 15 ML: 1.2 LIQUID ORAL at 17:01

## 2025-01-14 RX ADMIN — INSULIN LISPRO 4 UNITS: 100 INJECTION, SOLUTION INTRAVENOUS; SUBCUTANEOUS at 20:16

## 2025-01-14 RX ADMIN — IPRATROPIUM BROMIDE AND ALBUTEROL SULFATE 1.5 ML: 2.5; .5 SOLUTION RESPIRATORY (INHALATION) at 15:05

## 2025-01-14 RX ADMIN — AMIODARONE HYDROCHLORIDE 400 MG: 200 TABLET ORAL at 17:01

## 2025-01-14 RX ADMIN — ASPIRIN 162 MG: 81 TABLET, CHEWABLE ORAL at 09:42

## 2025-01-14 RX ADMIN — ACETAMINOPHEN 1000 MG: 500 TABLET, FILM COATED ORAL at 13:27

## 2025-01-14 RX ADMIN — NICARDIPINE HYDROCHLORIDE 5 MG/HR: 0.1 INJECTION INTRAVENOUS at 09:31

## 2025-01-14 RX ADMIN — METOPROLOL TARTRATE 12.5 MG: 25 TABLET, FILM COATED ORAL at 20:17

## 2025-01-14 RX ADMIN — INSULIN HUMAN 16.4 UNITS/HR: 1 INJECTION, SOLUTION INTRAVENOUS at 01:52

## 2025-01-14 RX ADMIN — ACETAMINOPHEN 1000 MG: 500 TABLET, FILM COATED ORAL at 20:11

## 2025-01-14 RX ADMIN — LIDOCAINE 2 PATCH: 0.04 PATCH TOPICAL at 09:44

## 2025-01-14 NOTE — CONSULTS
Nutrition Services    Patient Name:  Nemo Lazaro  YOB: 1960  MRN: [id number]  Admit Date:  1/13/2025    Consult for CAD/DM and Obesity education. Pt extubated last pm. NPO diet at this time. Will defer education until post ICU.     Electronically signed by:  Perla Chandler RDN, LUIS A  01/14/25 10:59 CST

## 2025-01-14 NOTE — PLAN OF CARE
Problem: Adult Inpatient Plan of Care  Goal: Plan of Care Review  Outcome: Progressing  Goal: Patient-Specific Goal (Individualized)  Outcome: Progressing  Goal: Absence of Hospital-Acquired Illness or Injury  Outcome: Progressing  Intervention: Identify and Manage Fall Risk  Recent Flowsheet Documentation  Taken 1/14/2025 0600 by Virgil Tripp RN  Safety Promotion/Fall Prevention: safety round/check completed  Taken 1/14/2025 0500 by Virgil Tripp RN  Safety Promotion/Fall Prevention: safety round/check completed  Taken 1/14/2025 0400 by Virgil Tripp RN  Safety Promotion/Fall Prevention: safety round/check completed  Taken 1/14/2025 0300 by Virgil Tripp RN  Safety Promotion/Fall Prevention: safety round/check completed  Taken 1/14/2025 0200 by Virgil Tripp RN  Safety Promotion/Fall Prevention: safety round/check completed  Taken 1/14/2025 0100 by Virgil Tripp RN  Safety Promotion/Fall Prevention: safety round/check completed  Taken 1/14/2025 0000 by Virgil Tripp RN  Safety Promotion/Fall Prevention: safety round/check completed  Taken 1/13/2025 2300 by Virgil Tripp RN  Safety Promotion/Fall Prevention: safety round/check completed  Taken 1/13/2025 2200 by Virgil Tripp RN  Safety Promotion/Fall Prevention: safety round/check completed  Taken 1/13/2025 2100 by Virgil Tripp RN  Safety Promotion/Fall Prevention: safety round/check completed  Taken 1/13/2025 2000 by Virgil Tripp RN  Safety Promotion/Fall Prevention: safety round/check completed  Intervention: Prevent Skin Injury  Recent Flowsheet Documentation  Taken 1/14/2025 0500 by Virgil Tripp RN  Body Position:   turned   supine  Taken 1/14/2025 0400 by Virgil Tripp RN  Skin Protection:   transparent dressing maintained   drying agents applied   incontinence pads utilized  Taken 1/14/2025 0300 by Virgil Tripp RN  Body Position:   turned   left   tilted  Taken 1/14/2025 0100 by Virgil Tripp RN  Body Position:   turned   right   tilted  Taken 1/14/2025 0000 by Josee  NELIA Herman  Skin Protection:   transparent dressing maintained   drying agents applied   incontinence pads utilized  Taken 1/13/2025 2300 by Virgil Tripp RN  Body Position:   turned   left   tilted  Taken 1/13/2025 2100 by Virgil Tripp RN  Body Position:   turned   supine  Taken 1/13/2025 2000 by Virgil Tripp RN  Skin Protection:   transparent dressing maintained   drying agents applied   incontinence pads utilized  Taken 1/13/2025 1900 by Virgil Tripp RN  Body Position:   turned   left   tilted  Intervention: Prevent and Manage VTE (Venous Thromboembolism) Risk  Recent Flowsheet Documentation  Taken 1/14/2025 0400 by Virgil Tripp RN  VTE Prevention/Management:   left   SCDs (sequential compression devices) on  Taken 1/14/2025 0000 by Virgil Tripp RN  VTE Prevention/Management:   left   SCDs (sequential compression devices) on  Taken 1/13/2025 2000 by Virgil Tripp RN  VTE Prevention/Management:   left   SCDs (sequential compression devices) on  Goal: Optimal Comfort and Wellbeing  Outcome: Progressing  Intervention: Monitor Pain and Promote Comfort  Recent Flowsheet Documentation  Taken 1/14/2025 0214 by Virgil Tripp RN  Pain Management Interventions: pain medication given  Goal: Readiness for Transition of Care  Outcome: Progressing     Problem: Skin Injury Risk Increased  Goal: Skin Health and Integrity  Outcome: Progressing  Intervention: Optimize Skin Protection  Recent Flowsheet Documentation  Taken 1/14/2025 0500 by Virgil Tripp RN  Head of Bed (HOB) Positioning: HOB at 30-45 degrees  Taken 1/14/2025 0400 by Virgil Tripp RN  Activity Management:   ambulated in room   up in chair  Pressure Reduction Techniques:   weight shift assistance provided   frequent weight shift encouraged   positioned off wounds   heels elevated off bed   pressure points protected  Pressure Reduction Devices: pressure-redistributing mattress utilized  Skin Protection:   transparent dressing maintained   drying agents applied    incontinence pads utilized  Taken 1/14/2025 0300 by Virgil Tripp RN  Head of Bed ([facility]) Positioning: HOB at 30-45 degrees  Taken 1/14/2025 0100 by Virgil Tripp RN  Head of Bed ([facility]) Positioning: HOB at 30-45 degrees  Taken 1/14/2025 0000 by Virgil Tripp RN  Activity Management: bedrest  Pressure Reduction Techniques:   weight shift assistance provided   frequent weight shift encouraged   positioned off wounds   heels elevated off bed   pressure points protected  Pressure Reduction Devices: pressure-redistributing mattress utilized  Skin Protection:   transparent dressing maintained   drying agents applied   incontinence pads utilized  Taken 1/13/2025 2300 by Virgil Tripp RN  Head of Bed ([facility]) Positioning: HOB at 30-45 degrees  Taken 1/13/2025 2100 by Virgil Tripp RN  Head of Bed ([facility]) Positioning: HOB at 30-45 degrees  Taken 1/13/2025 2000 by Virgil Tripp RN  Activity Management: bedrest  Pressure Reduction Techniques:   weight shift assistance provided   frequent weight shift encouraged   positioned off wounds   heels elevated off bed   pressure points protected  Pressure Reduction Devices: pressure-redistributing mattress utilized  Skin Protection:   transparent dressing maintained   drying agents applied   incontinence pads utilized  Taken 1/13/2025 1900 by Virgil Tripp RN  Head of Bed ([facility]) Positioning: HOB at 30-45 degrees     Problem: Cardiovascular Surgery  Goal: Improved Activity Tolerance  Outcome: Progressing  Goal: Optimal Coping with Heart Surgery  Outcome: Progressing  Goal: Effective Bowel Elimination  Outcome: Progressing  Goal: Effective Cardiac Function  Outcome: Progressing  Goal: Optimal Cerebral Tissue Perfusion  Outcome: Progressing  Intervention: Protect and Optimize Cerebral Perfusion  Recent Flowsheet Documentation  Taken 1/14/2025 0500 by Virgil Tripp RN  Head of Bed ([facility]) Positioning: HOB at 30-45 degrees  Taken 1/14/2025 0300 by Virgil Tripp RN  Head of Bed ([facility]) Positioning: HOB at 30-45  degrees  Taken 1/14/2025 0100 by Virgil Tripp RN  Head of Bed (HOB) Positioning: HOB at 30-45 degrees  Taken 1/13/2025 2300 by Virgil Tripp RN  Head of Bed (HOB) Positioning: HOB at 30-45 degrees  Taken 1/13/2025 2100 by Virgil Tripp RN  Head of Bed (HOB) Positioning: HOB at 30-45 degrees  Taken 1/13/2025 1900 by Virgil Tripp RN  Head of Bed (HOB) Positioning: HOB at 30-45 degrees  Goal: Fluid and Electrolyte Balance  Outcome: Progressing  Goal: Blood Glucose Level Within Target Range  Outcome: Progressing  Goal: Absence of Infection Signs and Symptoms  Outcome: Progressing  Goal: Anesthesia/Sedation Recovery  Outcome: Progressing  Intervention: Optimize Anesthesia Recovery  Recent Flowsheet Documentation  Taken 1/14/2025 0600 by Virgil Tripp RN  Safety Promotion/Fall Prevention: safety round/check completed  Taken 1/14/2025 0500 by Virgil Tripp RN  Safety Promotion/Fall Prevention: safety round/check completed  Taken 1/14/2025 0400 by Virgil Tripp RN  Safety Promotion/Fall Prevention: safety round/check completed  Taken 1/14/2025 0300 by Virgil Tripp RN  Safety Promotion/Fall Prevention: safety round/check completed  Taken 1/14/2025 0200 by Virgil Tripp RN  Safety Promotion/Fall Prevention: safety round/check completed  Taken 1/14/2025 0100 by Virgil Tripp RN  Safety Promotion/Fall Prevention: safety round/check completed  Taken 1/14/2025 0000 by Virgil Tripp RN  Safety Promotion/Fall Prevention: safety round/check completed  Taken 1/13/2025 2300 by Virgil Tripp RN  Safety Promotion/Fall Prevention: safety round/check completed  Taken 1/13/2025 2200 by Virgil Tripp RN  Safety Promotion/Fall Prevention: safety round/check completed  Taken 1/13/2025 2100 by Virgil Tripp RN  Safety Promotion/Fall Prevention: safety round/check completed  Taken 1/13/2025 2000 by Virgil Tripp RN  Safety Promotion/Fall Prevention: safety round/check completed  Goal: Acceptable Pain Control  Outcome: Progressing  Intervention: Prevent or  Manage Pain  Recent Flowsheet Documentation  Taken 1/14/2025 0214 by Virgil Tripp RN  Pain Management Interventions: pain medication given  Goal: Nausea and Vomiting Relief  Outcome: Progressing  Goal: Effective Urinary Elimination  Outcome: Progressing  Goal: Effective Oxygenation and Ventilation  Outcome: Progressing     Problem: Mechanical Ventilation Invasive  Goal: Effective Communication  Outcome: Progressing  Goal: Optimal Device Function  Outcome: Progressing  Intervention: Optimize Device Care and Function  Recent Flowsheet Documentation  Taken 1/14/2025 0400 by Virgil Tripp RN  Oral Care: oral rinse provided  Taken 1/14/2025 0000 by Virgil Tripp RN  Oral Care: oral rinse provided  Goal: Mechanical Ventilation Liberation  Outcome: Progressing  Goal: Optimal Nutrition Delivery  Outcome: Progressing  Goal: Absence of Device-Related Skin and Tissue Injury  Outcome: Progressing  Intervention: Maintain Skin and Tissue Health  Recent Flowsheet Documentation  Taken 1/14/2025 0400 by Virgil Tripp RN  Device Skin Pressure Protection:   skin-to-skin areas padded   skin-to-device areas padded   tubing/devices free from skin contact   absorbent pad utilized/changed  Taken 1/14/2025 0000 by Virgil Tripp RN  Device Skin Pressure Protection:   skin-to-skin areas padded   skin-to-device areas padded   tubing/devices free from skin contact   absorbent pad utilized/changed  Taken 1/13/2025 2000 by Virgil Tripp RN  Device Skin Pressure Protection:   skin-to-skin areas padded   skin-to-device areas padded   tubing/devices free from skin contact   absorbent pad utilized/changed  Goal: Absence of Ventilator-Induced Lung Injury  Outcome: Progressing  Intervention: Prevent Ventilator-Associated Pneumonia  Recent Flowsheet Documentation  Taken 1/14/2025 0500 by Virgil Tripp RN  Head of Bed (HOB) Positioning: HOB at 30-45 degrees  Taken 1/14/2025 0400 by Virgil Tripp RN  Oral Care: oral rinse provided  Taken 1/14/2025 0300 by Josee  NELIA Herman  Head of Bed (HOB) Positioning: HOB at 30-45 degrees  Taken 1/14/2025 0100 by Virgil Tripp RN  Head of Bed (HOB) Positioning: HOB at 30-45 degrees  Taken 1/14/2025 0000 by Virgil Tripp RN  Oral Care: oral rinse provided  Taken 1/13/2025 2300 by Virgil Tripp RN  Head of Bed (HOB) Positioning: HOB at 30-45 degrees  Taken 1/13/2025 2100 by Virgil Tripp RN  Head of Bed (HOB) Positioning: HOB at 30-45 degrees  Taken 1/13/2025 1900 by Virgil Tripp RN  Head of Bed (HOB) Positioning: HOB at 30-45 degrees     Problem: Fall Injury Risk  Goal: Absence of Fall and Fall-Related Injury  Outcome: Progressing  Intervention: Promote Injury-Free Environment  Recent Flowsheet Documentation  Taken 1/14/2025 0600 by Virgil Tripp RN  Safety Promotion/Fall Prevention: safety round/check completed  Taken 1/14/2025 0500 by Virgil Tripp RN  Safety Promotion/Fall Prevention: safety round/check completed  Taken 1/14/2025 0400 by Virgil Tripp RN  Safety Promotion/Fall Prevention: safety round/check completed  Taken 1/14/2025 0300 by Virgil Tripp RN  Safety Promotion/Fall Prevention: safety round/check completed  Taken 1/14/2025 0200 by Virgil Tripp RN  Safety Promotion/Fall Prevention: safety round/check completed  Taken 1/14/2025 0100 by Virgil Tripp RN  Safety Promotion/Fall Prevention: safety round/check completed  Taken 1/14/2025 0000 by Virgil Tripp RN  Safety Promotion/Fall Prevention: safety round/check completed  Taken 1/13/2025 2300 by Virgil Tripp RN  Safety Promotion/Fall Prevention: safety round/check completed  Taken 1/13/2025 2200 by Virgil Tripp RN  Safety Promotion/Fall Prevention: safety round/check completed  Taken 1/13/2025 2100 by Virgil Tripp RN  Safety Promotion/Fall Prevention: safety round/check completed  Taken 1/13/2025 2000 by Virgil Tripp RN  Safety Promotion/Fall Prevention: safety round/check completed   Goal Outcome Evaluation:

## 2025-01-14 NOTE — THERAPY TREATMENT NOTE
Acute Care - Physical Therapy Treatment Note  [facility]     Patient Name: Nemo Lazaro  : 1960  MRN: [id number]  Today's Date: 2025      Visit Dx:     ICD-10-CM ICD-9-CM   1. Impaired mobility [Z74.09]  Z74.09 799.89   2. Coronary artery disease of native artery of native heart with stable angina pectoris  I25.118 414.01     413.9     Patient Active Problem List   Diagnosis    Precordial chest pain    Coronary artery disease of native artery of native heart with stable angina pectoris    CAD (coronary artery disease)    HTN (hypertension)    Mixed hyperlipidemia    Type 2 diabetes mellitus    Former smoker    LORENA (obstructive sleep apnea)     Past Medical History:   Diagnosis Date    Anxiety     Arthritis     HIPS, KNEES    Bowel incontinence     pt states sometimes    Cataracts, bilateral     Coronary artery disease     Depression     Diabetes mellitus     type 2    Diverticulosis     GERD (gastroesophageal reflux disease)     History of transfusion     Hyperlipidemia     Hypertension     Kidney stones     Shortness of breath     Sleep apnea     PT USES A CPAP MACHINE    Urinary frequency     AND URGENCY     Past Surgical History:   Procedure Laterality Date    APPENDECTOMY      CARDIAC CATHETERIZATION N/A 10/23/2020    Procedure: Cardiac Catheterization/Vascular Study;  Surgeon: Devante Wang MD;  Location: [facility] CATH INVASIVE LOCATION;  Service: Cardiology;  Laterality: N/A;    CARDIAC CATHETERIZATION Left 10/23/2024    Procedure: Cardiac Catheterization/Vascular Study;  Surgeon: Devante Wang MD;  Location:  [facility] CATH INVASIVE LOCATION;  Service: Cardiology;  Laterality: Left;     SECTION      x2    CHOLECYSTECTOMY      CORONARY ARTERY BYPASS GRAFT N/A 2025    Procedure: CORONARY ARTERY BYPASS GRAFTING X3, LEFT INTERNAL MAMMARY ARTERY GRAFT, RIGHT LEG ENDOSCOPIC VEIN HARVEST, TRANSESOPHAGEAL ECHOCARDIOGRAM, APPLICATION OF PREVENA;  Surgeon: Lam Melgar MD;  Location:  "[facility] OR;  Service: Cardiothoracic;  Laterality: N/A;    HYSTERECTOMY       PT Assessment (Last 12 Hours)       PT Evaluation and Treatment       Row Name 01/14/25 1429          Physical Therapy Time and Intention    Subjective Information complains of;fatigue  -TB     Document Type therapy note (daily note)  -TB     Mode of Treatment physical therapy  -TB       Row Name 01/14/25 1429          General Information    Existing Precautions/Restrictions fall;oxygen therapy device and L/min;sternal  prevena w/v, 2L, Chest tube  -TB       Row Name 01/14/25 1429          Pain    Pretreatment Pain Rating 2/10  -TB     Posttreatment Pain Rating 2/10  -TB     Pain Location chest  -TB       Row Name 01/14/25 1429          Cognition    Orientation Status (Cognition) oriented x 4  -TB       Row Name 01/14/25 1429          Bed Mobility    Comment, (Bed Mobility) Chair  -TB       Row Name 01/14/25 1429          Transfers    Transfers sit-stand transfer;stand-sit transfer  -TB       Row Name 01/14/25 1429          Sit-Stand Transfer    Sit-Stand [place] (Transfers) minimum assist (75% patient effort);verbal cues  -TB       Row Name 01/14/25 1429          Stand-Sit Transfer    Stand-Sit [place] (Transfers) minimum assist (75% patient effort);verbal cues  -TB       Row Name 01/14/25 1429          Gait/Stairs (Locomotion)    [place] Level (Gait) minimum assist (75% patient effort);1 person assist;2 person assist  Equitment, chair follow  -TB     Distance in Feet (Gait) 50  50'x4 with 2 standing and 2 sitting rest breaks  -TB     Deviations/Abnormal Patterns (Gait) jennifer decreased;gait speed decreased  -TB     Bilateral Gait Deviations forward flexed posture  -TB     Comment, (Gait/Stairs) Increased c/o feeling \"hot\", pt started dry heaving/vomiting, kept feeling light headed. BP and O2 were both fine. Pt just felt very drowsy and having trouble staying awake.  -TB       Row Name 01/14/25 1429          Motor Skills    " Therapeutic Exercise --  Warm ups x15  -TB       Row Name             Wound 01/13/25 0819 Right medial leg    Wound - Properties Group Placement Date: 01/13/25  -TJ Placement Time: 0819  -TJ Side: Right  -TJ Orientation: medial  -TJ Location: leg  -TJ Primary Wound Type: Incision  -TJ    Retired Wound - Properties Group Placement Date: 01/13/25  -TJ Placement Time: 0819  -TJ Side: Right  -TJ Orientation: medial  -TJ Location: leg  -TJ Primary Wound Type: Incision  -TJ    Retired Wound - Properties Group Placement Date: 01/13/25  -TJ Placement Time: 0819  -TJ Side: Right  -TJ Orientation: medial  -TJ Location: leg  -TJ Primary Wound Type: Incision  -TJ    Retired Wound - Properties Group Date first assessed: 01/13/25  -TJ Time first assessed: 0819  -TJ Side: Right  -TJ Location: leg  -TJ Primary Wound Type: Incision  -TJ      Row Name             Wound 01/13/25 0819 Right groin    Wound - Properties Group Placement Date: 01/13/25  -TJ Placement Time: 0819  -TJ Side: Right  -TJ Location: groin  -TJ Primary Wound Type: Puncture  -TJ    Retired Wound - Properties Group Placement Date: 01/13/25  -TJ Placement Time: 0819  -TJ Side: Right  -TJ Location: groin  -TJ Primary Wound Type: Puncture  -TJ    Retired Wound - Properties Group Placement Date: 01/13/25  -TJ Placement Time: 0819  -TJ Side: Right  -TJ Location: groin  -TJ Primary Wound Type: Puncture  -TJ    Retired Wound - Properties Group Date first assessed: 01/13/25  -TJ Time first assessed: 0819  -TJ Side: Right  -TJ Location: groin  -TJ Primary Wound Type: Puncture  -TJ      Row Name             Wound 01/13/25 0858 midline sternal    Wound - Properties Group Placement Date: 01/13/25  -TJ Placement Time: 0858  -TJ Orientation: midline  -TJ Location: sternal  -TJ Primary Wound Type: Incision  -TJ    Retired Wound - Properties Group Placement Date: 01/13/25  -TJ Placement Time: 0858  -TJ Orientation: midline  -TJ Location: sternal  -TJ Primary Wound Type: Incision   -TJ    Retired Wound - Properties Group Placement Date: 01/13/25  -TJ Placement Time: 0858  -TJ Orientation: midline  -TJ Location: sternal  -TJ Primary Wound Type: Incision  -TJ    Retired Wound - Properties Group Date first assessed: 01/13/25  -TJ Time first assessed: 0858  -TJ Location: sternal  -TJ Primary Wound Type: Incision  -TJ      Row Name             Wound 01/13/25 0819 Right lower leg    Wound - Properties Group Placement Date: 01/13/25  -TJ Placement Time: 0819  -TJ Side: Right  -TJ Orientation: lower  -TJ Location: leg  -TJ Primary Wound Type: Puncture  -TJ    Retired Wound - Properties Group Placement Date: 01/13/25  -TJ Placement Time: 0819  -TJ Side: Right  -TJ Orientation: lower  -TJ Location: leg  -TJ Primary Wound Type: Puncture  -TJ    Retired Wound - Properties Group Placement Date: 01/13/25  -TJ Placement Time: 0819  -TJ Side: Right  -TJ Orientation: lower  -TJ Location: leg  -TJ Primary Wound Type: Puncture  -TJ    Retired Wound - Properties Group Date first assessed: 01/13/25  -TJ Time first assessed: 0819  -TJ Side: Right  -TJ Location: leg  -TJ Primary Wound Type: Puncture  -TJ      Row Name             NPWT (Negative Pressure Wound Therapy) 01/13/25 1250 sternum    NPWT (Negative Pressure Wound Therapy) - Properties Group Placement Date: 01/13/25  -TJ Placement Time: 1250  -TJ Location: sternum  -TJ Additional Comments: prevena  -TJ    Retired NPWT (Negative Pressure Wound Therapy) - Properties Group Placement Date: 01/13/25  -TJ Placement Time: 1250  -TJ Location: sternum  -TJ Additional Comments: prevena  -TJ    Retired NPWT (Negative Pressure Wound Therapy) - Properties Group Placement Date: 01/13/25  -TJ Placement Time: 1250  -TJ Location: sternum  -TJ Additional Comments: prevena  -TJ    Retired NPWT (Negative Pressure Wound Therapy) - Properties Group Placement Date: 01/13/25  -TJ Placement Time: 1250  -TJ Location: sternum  -TJ Additional Comments: prevena  -TJ      Row Name  01/14/25 1429          Positioning and Restraints    Pre-Treatment Position sitting in chair/recliner  -TB     Post Treatment Position chair  -TB     In Chair notified nsg;reclined;sitting;call light within reach;encouraged to call for assist;with nsg;legs elevated  -TB               User Key  (r) = Recorded By, (t) = Taken By, (c) = Cosigned By      Initials Name Provider Type    TB Georgia Jacobsen, PTA Physical Therapist Assistant    Valerie Adam RN Registered Nurse                    Physical Therapy Education       Title: PT OT SLP Therapies (In Progress)       Topic: Physical Therapy (In Progress)       Point: Mobility training (Done)       Learning Progress Summary            Patient Acceptance, E, VU,NR by RAF at 1/14/2025 0945    Comment: Benefits of activity, progression of PT, sternal prec                      Point: Home exercise program (Not Started)       Learner Progress:  Not documented in this visit.              Point: Body mechanics (Not Started)       Learner Progress:  Not documented in this visit.              Point: Precautions (Done)       Learning Progress Summary            Patient Acceptance, E, VU,NR by RAF at 1/14/2025 0945    Comment: Benefits of activity, progression of PT, sternal prec                                      User Key       Initials Effective Dates Name Provider Type Discipline    RAF 02/03/23 -  Joaquín Shaw, PT DPT Physical Therapist PT                  PT Recommendation and Plan             Time Calculation:    PT Charges       Row Name 01/14/25 1524 01/14/25 1113          Time Calculation    Start Time 1429  -TB 0945  -RAF     Stop Time 1452  -TB 1048  -RAF     Time Calculation (min) 23 min  -TB 63 min  -RAF     PT Received On 01/14/25  -TB 01/14/25  -RAF     PT Goal Re-Cert Due Date -- 01/24/25  -RAF        Time Calculation- PT    Total Timed Code Minutes- PT 23 minute(s)  -TB --               User Key  (r) = Recorded By, (t) = Taken By, (c) = Cosigned By       Initials Name Provider Type    Joaquín Tijerina, PT DPT Physical Therapist    TB Georgia Jacobsen, JEAN-PIERRE Physical Therapist Assistant                  Therapy Charges for Today       Code Description Service Date Service Provider Modifiers Qty    [id number] HC GAIT TRAINING EA 15 MIN 1/14/2025 Georgia Jacobsen PTA GP 2            PT G-Codes  Outcome Measure Options: AM-PAC 6 Clicks Basic Mobility (PT)  AM-PAC 6 Clicks Score (PT): 15    Georgia Jacobsen PTA  1/14/2025

## 2025-01-14 NOTE — PROGRESS NOTES
"Patient name: Nemo Lazaro  Patient : 1960  VISIT # [id number]  MR #[id number]    Procedure:Procedure(s):  Coronary artery bypass grafting-3 vessel (left internal mammary artery/left anterior descending, reverse saphenous vein graft/obtuse marginal, and reverse saphenous vein graft/posterior descending artery)  2. Right endoscopic vein harvest  3. Application of Prevena Incision Management  Procedure Date:2025  POD:1 Day Post-Op    Subjective   Extubated overnight.  Currently on room air and up in the chair.  No complaints of pain this morning.  Chest x-ray showed right pneumoperitoneum and Dr. Melgar was contacted earlier.  She reports history of GERD with ulcers and diverticulosis.  Says if she misses a dose of omeprazole then she usually ends up in the hospital.  Reports she stopped taking omeprazole for a few days as she ran out of the medication and could not afford to buy anymore.  Additional overnight events include facial swelling believed to be from vancomycin allergy.  Was placed on epinephrine infusion but this has since been discontinued.  Due to walk with PT.  No family present.    IV drips: Insulin, nitroglycerin, amiodarone  Telemetry: Sinus 60s  Hemodynamics reviewed, stable.  Cardiac output 5, cardiac index 2.7     Objective     Visit Vitals  /52   Pulse 61   Temp 99.8 °F (37.7 °C) (Bladder)   Resp 18   Ht 163.4 cm (64.33\")   Wt 89.8 kg (198 lb)   SpO2 92%   BMI 33.64 kg/m²   Baseline weight: 181 pounds    Intake/Output Summary (Last 24 hours) at 2025 0843  Last data filed at 2025 0600  Gross per 24 hour   Intake 4171.58 ml   Output 1850 ml   Net 2321.58 ml     Mediastinal chest tube output: 370 mL / 24 hours  Left pleural Dougie drain output: 70 mL / 24 hours  Serosanguineous fluid, no airleak    Lab:     CBC:  Results from last 7 days   Lab Units 25  0558 25  1709 25  1338   WBC 10*3/mm3 12.82* 12.82* 9.60   HEMATOCRIT % 26.8* 28.4* 27.1* "   PLATELETS 10*3/mm3 107* 127* 121*          BMP:  Results from last 7 days   Lab Units 01/14/25  0558 01/13/25  1709 01/13/25  1338   SODIUM mmol/L 133* 138 138   POTASSIUM mmol/L 4.3 4.3 5.2   CHLORIDE mmol/L 102 108* 108*   CO2 mmol/L 19.0* 21.0* 22.0   GLUCOSE mg/dL 103* 152* 161*   BUN mg/dL 17 13 11   CREATININE mg/dL 0.90 0.85 0.86          COAG:  Results from last 7 days   Lab Units 01/14/25  0145 01/13/25  1338   INR  1.17* 1.27*   APTT seconds  --  30.6       IMAGES:       Imaging Results (Last 24 Hours)       Procedure Component Value Units Date/Time    CT Abdomen Pelvis Without Contrast [[id number]] Resulted: 01/14/25 0833     Updated: 01/14/25 0842    XR Chest 1 View [[id number]] Collected: 01/14/25 0702     Updated: 01/14/25 0712    Narrative:      EXAMINATION: XR CHEST 1 VW-     1/14/2025 2:10 AM     HISTORY: Post-Op Heart Surgery; I25.118-Atherosclerotic heart disease of  native coronary artery with other forms of angina pectoris     A frontal projection of the chest is compared with the previous study  dated 1/13/2025.     The lungs are poorly expanded.     Endotracheal tube has been removed since the previous study. The  left-sided chest tube, right internal jugular venous access catheter and  mediastinal drain are in place.     There is blunting of the left lateral costophrenic sulcus which may  suggest a small pleural effusion. This was not seen in the previous  study.     There is no pulmonary congestion or pneumothorax.     The heart size is not optimally visualized due to the AP projection.  There is evidence of previous cardiac surgery.     A small amount of pneumoperitoneum is seen under the right diaphragm  which was not seen in the previous study.     No acute bony abnormality.       Impression:      1. A small pneumoperitoneum under the right diaphragm which was not  noted in the previous study obtained 1 day ago. The etiology is not  certain. This may be clinically correlated.  2. Poor  lung expansion. Probable small pleural effusion at the left  base. The tubes and lines in place.     The above report was immediately called to ICU and given to the nurse in  charge of the patient at 7:08 a.m.        This report was signed and finalized on 1/14/2025 7:09 AM by Dr. Brigido Mcpherson MD.       XR Chest 1 View [[id number]] Collected: 01/13/25 1436     Updated: 01/13/25 1441    Narrative:      EXAMINATION:  XR CHEST 1 VW-  1/13/2025 1:08 PM     HISTORY: Post-Op Check Line & Tube Placement; I25.118-Atherosclerotic  heart disease of native coronary artery with other forms of angina  pectoris.     COMPARISON: 1/10/2025.     TECHNIQUE: Single view AP image.     FINDINGS: The patient is intubated with the endotracheal tube tip at the  T3-4 level. There is a right IJ central venous catheter and sheath.  There are 2 mediastinal drains and a left chest tube. There is no  evidence of pneumothorax. There is hypoventilation with vascular  crowding. There are opacities in both lung bases. Heart size is  prominent.          Impression:      1. Status post heart surgery with placement of various lines and tubes,  as described.  2. Hypoventilation with vascular crowding.  3. Opacities in both lung bases likely due to atelectasis.  4. Heart size is prominent.           This report was signed and finalized on 1/13/2025 2:38 PM by Dr. Jose Hanna MD.           Independent review of chest x-ray: Low lung volumes, no pneumothorax, right pneumoperitoneum under right diaphragm-new finding      Physical Exam:  General: Alert, oriented. No apparent distress. Up in the chair.  Facial swelling improved.  Cardiovascular: Regular rate and rhythm without murmur, rubs, or gallops.    Pulmonary: Clear to auscultation bilaterally without wheezing, rubs, or rales.  Chest: Sternotomy incision clean, dry, and intact with prevena. Sternum stable. No clicks.  Mediastinal chest tubes and left pleural drain in place.  Abdomen: Soft, non  distended, and non tender.  Extremities: Warm, moves all extremities. Saphenectomy site clean, dry, and intact.   Neurologic:  Grossly intact with no focal deficits.            Impression:  Coronary artery disease  Stable angina pectoris  Type 2 diabetes mellitus with history of poorly controlled diabetes  Class I obesity  Chronic tobacco use  GERD  Right diaphragm pneumoperitoneum      Plan:  N.p.o., stat CT scan of the abdomen and pelvis without contrast for right diaphragm pneumoperitoneum  Lactate now  Repeat labs this afternoon  Vancomycin has been added as an allergy, Dr. Melgar discussed with pharmacy staff.  Okay to continue Ancef.  Encourage pulmonary toilet and ambulation  Routine postcardiac surgery regimen  Further recommendations forthcoming after review of CT scan of the abdomen, discussed with patient and nursing      Gabby Slaughter, APRTIM  01/14/25  08:43 CST

## 2025-01-14 NOTE — THERAPY EVALUATION
Patient Name: Nemo Lazaro  : 1960    MRN: [id number]                              Today's Date: 2025       Admit Date: 2025    Visit Dx:     ICD-10-CM ICD-9-CM   1. Impaired mobility [Z74.09]  Z74.09 799.89   2. Coronary artery disease of native artery of native heart with stable angina pectoris  I25.118 414.01     413.9     Patient Active Problem List   Diagnosis    Precordial chest pain    Coronary artery disease of native artery of native heart with stable angina pectoris    CAD (coronary artery disease)    HTN (hypertension)    Mixed hyperlipidemia    Type 2 diabetes mellitus    Former smoker    LORENA (obstructive sleep apnea)     Past Medical History:   Diagnosis Date    Anxiety     Arthritis     HIPS, KNEES    Bowel incontinence     pt states sometimes    Cataracts, bilateral     Coronary artery disease     Depression     Diabetes mellitus     type 2    Diverticulosis     GERD (gastroesophageal reflux disease)     History of transfusion     Hyperlipidemia     Hypertension     Kidney stones     Shortness of breath     Sleep apnea     PT USES A CPAP MACHINE    Urinary frequency     AND URGENCY     Past Surgical History:   Procedure Laterality Date    APPENDECTOMY      CARDIAC CATHETERIZATION N/A 10/23/2020    Procedure: Cardiac Catheterization/Vascular Study;  Surgeon: Devante Wang MD;  Location:  [facility];  Service: Cardiology;  Laterality: N/A;    CARDIAC CATHETERIZATION Left 10/23/2024    Procedure: Cardiac Catheterization/Vascular Study;  Surgeon: Devante Wang MD;  Location:  [facility]ATH [facility];  Service: Cardiology;  Laterality: Left;     SECTION      x2    CHOLECYSTECTOMY      CORONARY ARTERY BYPASS GRAFT N/A 2025    Procedure: CORONARY ARTERY BYPASS GRAFTING X3, LEFT INTERNAL MAMMARY ARTERY GRAFT, RIGHT LEG ENDOSCOPIC VEIN HARVEST, TRANSESOPHAGEAL ECHOCARDIOGRAM, APPLICATION OF PREVENA;  Surgeon: Lam Melgar MD;  Location:   [facility] OR;  Service: Cardiothoracic;  Laterality: N/A;    HYSTERECTOMY        General Information       Row Name 01/14/25 0945          Physical Therapy Time and Intention    Document Type evaluation  s/p 1/13 CABG x 3, R EVH, prevena w/v. Post op R pneumoperitoneum  -RAF     Mode of Treatment physical therapy  -RAF       Row Name 01/14/25 0945          General Information    Patient Profile Reviewed yes  -RAF     Prior Level of Function independent:;all household mobility;ADL's  -RAF     Existing Precautions/Restrictions fall;oxygen therapy device and L/min;sternal  prevena w/v  -RAF     Barriers to Rehab medically complex  -RAF       Row Name 01/14/25 0945          Living Environment    People in Home spouse  -RAF       Row Name 01/14/25 0945          Cognition    Orientation Status (Cognition) oriented x 4  -RAF       Row Name 01/14/25 0945          Safety Issues/Impairments Affecting Functional Mobility    Impairments Affecting Function (Mobility) balance;endurance/activity tolerance;strength;shortness of breath;pain  -RAF               User Key  (r) = Recorded By, (t) = Taken By, (c) = Cosigned By      Initials Name Provider Type    Joaquín Tijerina, PT DPT Physical Therapist                   Mobility       Row Name 01/14/25 0945          Bed Mobility    Comment, (Bed Mobility) in chair  -RAF       Row Name 01/14/25 0945          Sit-Stand Transfer    Sit-Stand [place] (Transfers) minimum assist (75% patient effort);1 person assist;2 person assist  -RAF       Row Name 01/14/25 0945          Gait/Stairs (Locomotion)    [place] Level (Gait) minimum assist (75% patient effort);1 person assist;2 person assist  -RAF     Distance in Feet (Gait) 200  200 ft total w 2 sitting rest breaks due to dizziness and B LE weakness  -RAF     Deviations/Abnormal Patterns (Gait) jennifer decreased;gait speed decreased  -RAF               User Key  (r) = Recorded By, (t) = Taken By, (c) = Cosigned By      Initials Name Provider Type     Joaquín Tijerina, PT DPT Physical Therapist                   Obj/Interventions       Row Name 01/14/25 0945          Range of Motion Comprehensive    General Range of Motion bilateral lower extremity ROM WFL  -RAF       Row Name 01/14/25 0945          Strength Comprehensive (MMT)    Comment, General Manual Muscle Testing (MMT) Assessment B LE grossly 4-/5  -RAF       Row Name 01/14/25 0945          Balance    Balance Assessment sitting dynamic balance;standing dynamic balance  -RAF     Dynamic Sitting Balance standby assist  -RAF     Position, Sitting Balance unsupported;sitting in chair  -RAF     Dynamic Standing Balance minimal assist;1-person assist;2-person assist  -RAF     Position/Device Used, Standing Balance supported  -RAF       Row Name 01/14/25 0945          Sensory Assessment (Somatosensory)    Sensory Assessment (Somatosensory) LE sensation intact  -RAF               User Key  (r) = Recorded By, (t) = Taken By, (c) = Cosigned By      Initials Name Provider Type    Joaquín Tijerina, PT DPT Physical Therapist                   Goals/Plan       [facility] Name 01/14/25 0945          Bed Mobility Goal 1 (PT)    Activity/Assistive Device (Bed Mobility Goal 1, PT) sit to supine/supine to sit  -RAF     [place] Level/Cues Needed (Bed Mobility Goal 1, PT) supervision required  -RAF     Time Frame (Bed Mobility Goal 1, PT) long term goal (LTG);10 days  -RAF     Progress/Outcomes (Bed Mobility Goal 1, PT) new goal  -[facility] Name 01/14/25 0945          Transfer Goal 1 (PT)    Activity/Assistive Device (Transfer Goal 1, PT) sit-to-stand/stand-to-sit;bed-to-chair/chair-to-bed  -RAF     [place] Level/Cues Needed (Transfer Goal 1, PT) supervision required  -RAF     Time Frame (Transfer Goal 1, PT) long term goal (LTG);10 days  -RAF     Progress/Outcome (Transfer Goal 1, PT) new goal  -RAF       Row Name 01/14/25 0945          Gait Training Goal 1 (PT)    Activity/Assistive Device (Gait Training Goal 1, PT) gait  (walking locomotion);decrease fall risk;improve balance and speed;increase endurance/gait distance  -RAF     [place] Level (Gait Training Goal 1, PT) supervision required  -RAF     Distance (Gait Training Goal 1, PT) 250 ft  -RAF     Time Frame (Gait Training Goal 1, PT) long term goal (LTG);10 days  -RAF     Progress/Outcome (Gait Training Goal 1, PT) new goal  -RAF       Row Name 01/14/25 0945          Therapy Assessment/Plan (PT)    Planned Therapy Interventions (PT) bed mobility training;transfer training;gait training;balance training;home exercise program;patient/family education;postural re-education;stair training;strengthening  -RAF               User Key  (r) = Recorded By, (t) = Taken By, (c) = Cosigned By      Initials Name Provider Type    Joaquín Tijerina, PT DPT Physical Therapist                   Clinical Impression       Row Name 01/14/25 0945          Pain    Pain Location chest  -RAF     Pain Side/Orientation anterior;medial  -RAF     Pain Management Interventions exercise or physical activity utilized  -RAF     Additional Documentation Pain Scale: FACES Pre/Post-Treatment (Group)  -RAF       Row Name 01/14/25 0945          Pain Scale: FACES Pre/Post-Treatment    Pain: FACES Scale, Pretreatment 4-->hurts little more  -RAF       Row Name 01/14/25 0916          Plan of Care Review    Plan of Care Reviewed With patient  -RAF     Outcome Evaluation PT reginald complete. She is alert and oriented. She is  and reports being independent in her mobility and ADL's prior to admit. She was educated on sternal precautions and will need continued education. Today she stands and ambulates with min assist x 1-2. Was able to ambulate 200 ft total needing 2 sitting rest breaks during the first 50 ft of gait. Reports dizziness and B LE weakness. She then stood and ambulated 150 ft of continous gait. PT will cont with mobility training and strengthening. I am hopeful she will recover and d.c home w her spouse.  -RAF        Row Name 01/14/25 0945          Therapy Assessment/Plan (PT)    Patient/Family Therapy Goals Statement (PT) decrease pain  -RAF     Rehab Potential (PT) good  -RAF     Criteria for Skilled Interventions Met (PT) yes;meets criteria;skilled treatment is necessary  -RAF     Therapy Frequency (PT) 2 times/day  -RAF     Predicted Duration of Therapy Intervention (PT) until d/c  -RAF       Row Name 01/14/25 0945          Vital Signs    Pre Systolic BP Rehab 128  -RAF     Pre Treatment Diastolic BP 49  -RAF     Post Systolic BP Rehab 122  -RAF     Post Treatment Diastolic BP 45  -RAF     Pretreatment Heart Rate (beats/min) 63  -RAF     Posttreatment Heart Rate (beats/min) 62  -RAF     Pre SpO2 (%) 91  -RAF     O2 Delivery Pre Treatment nasal cannula  -RAF     Post SpO2 (%) 92  -RAF     O2 Delivery Post Treatment nasal cannula  -RAF     Pre Patient Position Sitting  -RAF     Intra Patient Position Standing  -RAF     Post Patient Position Sitting  -RAF       Row Name 01/14/25 0945          Positioning and Restraints    Pre-Treatment Position sitting in chair/recliner  -RAF     Post Treatment Position chair  -RAF     In Chair notified nsg;reclined;call light within reach;encouraged to call for assist;RUE elevated;LUE elevated;with nsg;patient within staff view  -RAF               User Key  (r) = Recorded By, (t) = Taken By, (c) = Cosigned By      Initials Name Provider Type    Joaquín Tijerina, PT DPT Physical Therapist                   Outcome Measures       Row Name 01/14/25 0945          How much help from another person do you currently need...    Turning from your back to your side while in flat bed without using bedrails? 2  -RAF     Moving from lying on back to sitting on the side of a flat bed without bedrails? 2  -RAF     Moving to and from a bed to a chair (including a wheelchair)? 3  -RAF     Standing up from a chair using your arms (e.g., wheelchair, bedside chair)? 3  -RAF     Climbing 3-5 steps with a railing? 2  -RAF     To  walk in hospital room? 3  -RAF     AM-PAC 6 Clicks Score (PT) 15  -RAF     Highest Level of Mobility Goal 4 --> Transfer to chair/commode  -RAF       Row Name 01/14/25 0945          Functional Assessment    Outcome Measure Options AM-PAC 6 Clicks Basic Mobility (PT)  -RAF               User Key  (r) = Recorded By, (t) = Taken By, (c) = Cosigned By      Initials Name Provider Type    Joaquín Tijerina PT DPT Physical Therapist                                 Physical Therapy Education       Title: PT OT SLP Therapies (In Progress)       Topic: Physical Therapy (In Progress)       Point: Mobility training (Done)       Learning Progress Summary            Patient Acceptance, E, VU,NR by RAF at 1/14/2025 0945    Comment: Benefits of activity, progression of PT, sternal prec                      Point: Home exercise program (Not Started)       Learner Progress:  Not documented in this visit.              Point: Body mechanics (Not Started)       Learner Progress:  Not documented in this visit.              Point: Precautions (Done)       Learning Progress Summary            Patient Acceptance, E, VU,NR by RAF at 1/14/2025 0945    Comment: Benefits of activity, progression of PT, sternal prec                                      User Key       Initials Effective Dates Name Provider Type Discipline    RAF 02/03/23 -  Joaquín Shaw PT DPT Physical Therapist PT                  PT Recommendation and Plan  Planned Therapy Interventions (PT): bed mobility training, transfer training, gait training, balance training, home exercise program, patient/family education, postural re-education, stair training, strengthening  Outcome Evaluation: PT eval complete. She is alert and oriented. She is  and reports being independent in her mobility and ADL's prior to admit. She was educated on sternal precautions and will need continued education. Today she stands and ambulates with min assist x 1-2. Was able to ambulate 200 ft  total needing 2 sitting rest breaks during the first 50 ft of gait. Reports dizziness and B LE weakness. She then stood and ambulated 150 ft of continous gait. PT will cont with mobility training and strengthening. I am hopeful she will recover and d.c home w her spouse.     Time Calculation:         PT Charges       Row Name 01/14/25 1113             Time Calculation    Start Time 0945  -RAF      Stop Time 1048  -RAF      Time Calculation (min) 63 min  -RAF      PT Received On 01/14/25  -RAF      PT Goal Re-Cert Due Date 01/24/25  -RAF                User Key  (r) = Recorded By, (t) = Taken By, (c) = Cosigned By      Initials Name Provider Type    Joaquín Tijerina, PT DPT Physical Therapist                  Therapy Charges for Today       Code Description Service Date Service Provider Modifiers Qty    [id number] HC PT EVAL MOD COMPLEXITY 4 1/14/2025 Joaquín Shaw PT DPT GP 1            PT G-Codes  Outcome Measure Options: AM-PAC 6 Clicks Basic Mobility (PT)  AM-PAC 6 Clicks Score (PT): 15  PT Discharge Summary  Anticipated Discharge Disposition (PT): home with assist    Joaquín Shaw, PT DPT  1/14/2025

## 2025-01-14 NOTE — PLAN OF CARE
Goal Outcome Evaluation:  Plan of Care Reviewed With: patient           Outcome Evaluation: PT eval complete. She is alert and oriented. She is  and reports being independent in her mobility and ADL's prior to admit. She was educated on sternal precautions and will need continued education. Today she stands and ambulates with min assist x 1-2. Was able to ambulate 200 ft total needing 2 sitting rest breaks during the first 50 ft of gait. Reports dizziness and B LE weakness. She then stood and ambulated 150 ft of continous gait. PT will cont with mobility training and strengthening. I am hopeful she will recover and d.c home w her spouse.    Anticipated Discharge Disposition (PT): home with assist

## 2025-01-14 NOTE — CASE MANAGEMENT/SOCIAL WORK
Discharge Planning Assessment  [facility]     Patient Name: Nemo Lazaro  MRN: [id number]  Today's Date: 1/14/2025    Admit Date: 1/13/2025        Discharge Needs Assessment       Row Name 01/14/25 1056       Living Environment    People in Home spouse    Name(s) of People in Home SOLITARIO LAZARO (Spouse)  [phone] (Home Phone)    Current Living Arrangements home    Potentially Unsafe Housing Conditions none    In the past 12 months has the electric, gas, oil, or water company threatened to shut off services in your home? No    Primary Care Provided by self    Provides Primary Care For no one    Family Caregiver if Needed spouse    Family Caregiver Names SOLITARIO LAZARO (Spouse)  [phone] (Home Phone)    Quality of Family Relationships helpful;involved;supportive    Able to Return to Prior Arrangements yes       Resource/Environmental Concerns    Resource/Environmental Concerns none    Transportation Concerns none       Transportation Needs    In the past 12 months, has lack of transportation kept you from medical appointments or from getting medications? no    In the past 12 months, has lack of transportation kept you from meetings, work, or from getting things needed for daily living? No       Food Insecurity    Within the past 12 months, you worried that your food would run out before you got the money to buy more. Never true    Within the past 12 months, the food you bought just didn't last and you didn't have money to get more. Never true       Transition Planning    Patient/Family Anticipates Transition to home with family    Patient/Family Anticipated Services at Transition none    Transportation Anticipated family or friend will provide       Discharge Needs Assessment    Equipment Currently Used at Home cpap    Concerns to be Addressed discharge planning    Do you want help finding or keeping work or a job? I do not need or want help    Do you want help with school or training? For example,  starting or completing job training or getting a high school diploma, GED or equivalent No    Anticipated Changes Related to Illness none    Equipment Needed After Discharge none    Discharge Coordination/Progress Patient lives at home with .  CPAP is only DME. Has established primary care and Medicaid coverage. No immediate needs. She does anticipate returning home with  at discharge. CM/SS will follow during recovery and be available to assist with any needs.                   Discharge Plan    No documentation.                 Continued Care and Services - Admitted Since 1/13/2025    No active coordination exists for this encounter.          Demographic Summary    No documentation.                  Functional Status    No documentation.                  Psychosocial    No documentation.                  Abuse/Neglect    No documentation.                  Legal    No documentation.                  Substance Abuse    No documentation.                  Patient Forms    No documentation.                     Talya Altman RN

## 2025-01-14 NOTE — PLAN OF CARE
Problem: Adult Inpatient Plan of Care  Goal: Plan of Care Review  Outcome: Progressing  Flowsheets (Taken 1/13/2025 1841)  Progress: improving  Goal: Patient-Specific Goal (Individualized)  Outcome: Progressing  Goal: Absence of Hospital-Acquired Illness or Injury  Outcome: Progressing  Intervention: Identify and Manage Fall Risk  Recent Flowsheet Documentation  Taken 1/13/2025 1800 by Donna Green RN  Safety Promotion/Fall Prevention: safety round/check completed  Taken 1/13/2025 1700 by Donna Green RN  Safety Promotion/Fall Prevention: safety round/check completed  Taken 1/13/2025 1600 by Donna Green RN  Safety Promotion/Fall Prevention: safety round/check completed  Taken 1/13/2025 1500 by Donna Green RN  Safety Promotion/Fall Prevention: safety round/check completed  Taken 1/13/2025 1400 by Donna Green RN  Safety Promotion/Fall Prevention: safety round/check completed  Taken 1/13/2025 1330 by Donna Green RN  Safety Promotion/Fall Prevention: safety round/check completed  Intervention: Prevent Skin Injury  Recent Flowsheet Documentation  Taken 1/13/2025 1700 by Donna Green RN  Body Position:   turned   right  Taken 1/13/2025 1500 by Donna Green RN  Body Position:   supine   unstable with turn  Taken 1/13/2025 1330 by Donna Green RN  Body Position: supine  Intervention: Prevent and Manage VTE (Venous Thromboembolism) Risk  Recent Flowsheet Documentation  Taken 1/13/2025 1330 by Donna Green RN  VTE Prevention/Management: (see mar)   left   SCDs (sequential compression devices) on   other (see comments)  Goal: Optimal Comfort and Wellbeing  Outcome: Progressing  Intervention: Provide Person-Centered Care  Recent Flowsheet Documentation  Taken 1/13/2025 1330 by Donna Green RN  Trust Relationship/Rapport:   choices provided   care explained  Goal: Readiness for Transition of Care  Outcome: Progressing     Problem: Skin Injury Risk Increased  Goal: Skin Health and Integrity  Outcome:  Progressing  Intervention: Optimize Skin Protection  Recent Flowsheet Documentation  Taken 1/13/2025 1800 by Donna Green RN  Activity Management: bedrest  Taken 1/13/2025 1700 by Donna Green RN  Activity Management: bedrest  Head of Bed (HOB) Positioning: HOB at 30-45 degrees  Taken 1/13/2025 1500 by Donna Green RN  Activity Management: bedrest  Taken 1/13/2025 1330 by Donna Green RN  Activity Management: bedrest     Problem: Cardiovascular Surgery  Goal: Improved Activity Tolerance  Outcome: Progressing  Goal: Optimal Coping with Heart Surgery  Outcome: Progressing  Goal: Effective Bowel Elimination  Outcome: Progressing  Goal: Effective Cardiac Function  Outcome: Progressing  Goal: Optimal Cerebral Tissue Perfusion  Outcome: Progressing  Intervention: Protect and Optimize Cerebral Perfusion  Recent Flowsheet Documentation  Taken 1/13/2025 1700 by Donna Green RN  Head of Bed (HOB) Positioning: HOB at 30-45 degrees  Goal: Fluid and Electrolyte Balance  Outcome: Progressing  Goal: Blood Glucose Level Within Target Range  Outcome: Progressing  Goal: Absence of Infection Signs and Symptoms  Outcome: Progressing  Goal: Anesthesia/Sedation Recovery  Outcome: Progressing  Intervention: Optimize Anesthesia Recovery  Recent Flowsheet Documentation  Taken 1/13/2025 1800 by Donna Green RN  Safety Promotion/Fall Prevention: safety round/check completed  Taken 1/13/2025 1700 by Donna Green RN  Safety Promotion/Fall Prevention: safety round/check completed  Taken 1/13/2025 1600 by Donna Green RN  Safety Promotion/Fall Prevention: safety round/check completed  Taken 1/13/2025 1500 by Donna Green RN  Safety Promotion/Fall Prevention: safety round/check completed  Taken 1/13/2025 1400 by Donna Green RN  Safety Promotion/Fall Prevention: safety round/check completed  Taken 1/13/2025 1330 by Donna Green RN  Safety Promotion/Fall Prevention: safety round/check completed  Goal: Acceptable Pain  Control  Outcome: Progressing  Goal: Nausea and Vomiting Relief  Outcome: Progressing  Goal: Effective Urinary Elimination  Outcome: Progressing  Goal: Effective Oxygenation and Ventilation  Outcome: Progressing     Problem: Mechanical Ventilation Invasive  Goal: Effective Communication  Outcome: Progressing  Intervention: Ensure Effective Communication  Recent Flowsheet Documentation  Taken 1/13/2025 1330 by Donna Green, RN  Trust Relationship/Rapport:   choices provided   care explained  Goal: Optimal Device Function  Outcome: Progressing  Intervention: Optimize Device Care and Function  Recent Flowsheet Documentation  Taken 1/13/2025 1330 by Donna Green, RN  Airway Safety Measures: mask valve resuscitator at bedside  Goal: Mechanical Ventilation Liberation  Outcome: Progressing  Goal: Optimal Nutrition Delivery  Outcome: Progressing  Goal: Absence of Device-Related Skin and Tissue Injury  Outcome: Progressing  Goal: Absence of Ventilator-Induced Lung Injury  Outcome: Progressing  Intervention: Prevent Ventilator-Associated Pneumonia  Recent Flowsheet Documentation  Taken 1/13/2025 1700 by oDnna Green, RN  Head of Bed (HOB) Positioning: HOB at 30-45 degrees   Goal Outcome Evaluation:           Progress: improving

## 2025-01-15 ENCOUNTER — APPOINTMENT (OUTPATIENT)
Dept: CT IMAGING | Facility: HOSPITAL | Age: 65
DRG: 236 | End: 2025-01-15
Payer: COMMERCIAL

## 2025-01-15 ENCOUNTER — APPOINTMENT (OUTPATIENT)
Dept: GENERAL RADIOLOGY | Facility: HOSPITAL | Age: 65
DRG: 236 | End: 2025-01-15
Payer: COMMERCIAL

## 2025-01-15 LAB
ALBUMIN SERPL-MCNC: 3.7 G/DL (ref 3.5–5.2)
ANION GAP SERPL CALCULATED.3IONS-SCNC: 9 MMOL/L (ref 5–15)
BASOPHILS # BLD AUTO: 0.02 10*3/MM3 (ref 0–0.2)
BASOPHILS NFR BLD AUTO: 0.2 % (ref 0–1.5)
BUN SERPL-MCNC: 18 MG/DL (ref 8–23)
BUN/CREAT SERPL: 20.2 (ref 7–25)
CALCIUM SPEC-SCNC: 8.5 MG/DL (ref 8.6–10.5)
CHLORIDE SERPL-SCNC: 103 MMOL/L (ref 98–107)
CO2 SERPL-SCNC: 20 MMOL/L (ref 22–29)
CREAT SERPL-MCNC: 0.89 MG/DL (ref 0.57–1)
D-LACTATE SERPL-SCNC: 1.1 MMOL/L (ref 0.5–2)
DEPRECATED RDW RBC AUTO: 46.8 FL (ref 37–54)
EGFRCR SERPLBLD CKD-EPI 2021: 72.5 ML/MIN/1.73
EOSINOPHIL # BLD AUTO: 0.14 10*3/MM3 (ref 0–0.4)
EOSINOPHIL NFR BLD AUTO: 1.1 % (ref 0.3–6.2)
ERYTHROCYTE [DISTWIDTH] IN BLOOD BY AUTOMATED COUNT: 13.9 % (ref 12.3–15.4)
GLUCOSE BLDC GLUCOMTR-MCNC: 218 MG/DL (ref 70–130)
GLUCOSE BLDC GLUCOMTR-MCNC: 226 MG/DL (ref 70–130)
GLUCOSE BLDC GLUCOMTR-MCNC: 245 MG/DL (ref 70–130)
GLUCOSE BLDC GLUCOMTR-MCNC: 268 MG/DL (ref 70–130)
GLUCOSE BLDC GLUCOMTR-MCNC: 285 MG/DL (ref 70–130)
GLUCOSE SERPL-MCNC: 237 MG/DL (ref 65–99)
HCT VFR BLD AUTO: 25 % (ref 34–46.6)
HGB BLD-MCNC: 8.4 G/DL (ref 12–15.9)
IMM GRANULOCYTES # BLD AUTO: 0.07 10*3/MM3 (ref 0–0.05)
IMM GRANULOCYTES NFR BLD AUTO: 0.5 % (ref 0–0.5)
LYMPHOCYTES # BLD AUTO: 1.25 10*3/MM3 (ref 0.7–3.1)
LYMPHOCYTES NFR BLD AUTO: 9.5 % (ref 19.6–45.3)
MCH RBC QN AUTO: 31 PG (ref 26.6–33)
MCHC RBC AUTO-ENTMCNC: 33.6 G/DL (ref 31.5–35.7)
MCV RBC AUTO: 92.3 FL (ref 79–97)
MONOCYTES # BLD AUTO: 0.64 10*3/MM3 (ref 0.1–0.9)
MONOCYTES NFR BLD AUTO: 4.9 % (ref 5–12)
NEUTROPHILS NFR BLD AUTO: 11.06 10*3/MM3 (ref 1.7–7)
NEUTROPHILS NFR BLD AUTO: 83.8 % (ref 42.7–76)
PHOSPHATE SERPL-MCNC: 4.1 MG/DL (ref 2.5–4.5)
PLATELET # BLD AUTO: 111 10*3/MM3 (ref 140–450)
PMV BLD AUTO: 11.4 FL (ref 6–12)
POTASSIUM SERPL-SCNC: 4.6 MMOL/L (ref 3.5–5.2)
QT INTERVAL: 444 MS
QTC INTERVAL: 432 MS
RBC # BLD AUTO: 2.71 10*6/MM3 (ref 3.77–5.28)
SODIUM SERPL-SCNC: 132 MMOL/L (ref 136–145)
WBC NRBC COR # BLD AUTO: 13.18 10*3/MM3 (ref 3.4–10.8)

## 2025-01-15 PROCEDURE — 93010 ELECTROCARDIOGRAM REPORT: CPT | Performed by: HOSPITALIST

## 2025-01-15 PROCEDURE — 97116 GAIT TRAINING THERAPY: CPT

## 2025-01-15 PROCEDURE — 94799 UNLISTED PULMONARY SVC/PX: CPT

## 2025-01-15 PROCEDURE — 25510000001 IOPAMIDOL 61 % SOLUTION: Performed by: THORACIC SURGERY (CARDIOTHORACIC VASCULAR SURGERY)

## 2025-01-15 PROCEDURE — 63710000001 INSULIN LISPRO (HUMAN) PER 5 UNITS: Performed by: THORACIC SURGERY (CARDIOTHORACIC VASCULAR SURGERY)

## 2025-01-15 PROCEDURE — 80069 RENAL FUNCTION PANEL: CPT | Performed by: THORACIC SURGERY (CARDIOTHORACIC VASCULAR SURGERY)

## 2025-01-15 PROCEDURE — 82948 REAGENT STRIP/BLOOD GLUCOSE: CPT

## 2025-01-15 PROCEDURE — 25010000002 FUROSEMIDE PER 20 MG: Performed by: NURSE PRACTITIONER

## 2025-01-15 PROCEDURE — 25010000002 CEFAZOLIN PER 500 MG: Performed by: THORACIC SURGERY (CARDIOTHORACIC VASCULAR SURGERY)

## 2025-01-15 PROCEDURE — 83605 ASSAY OF LACTIC ACID: CPT | Performed by: THORACIC SURGERY (CARDIOTHORACIC VASCULAR SURGERY)

## 2025-01-15 PROCEDURE — 25010000002 ENOXAPARIN PER 10 MG: Performed by: THORACIC SURGERY (CARDIOTHORACIC VASCULAR SURGERY)

## 2025-01-15 PROCEDURE — 71045 X-RAY EXAM CHEST 1 VIEW: CPT

## 2025-01-15 PROCEDURE — 93005 ELECTROCARDIOGRAM TRACING: CPT | Performed by: THORACIC SURGERY (CARDIOTHORACIC VASCULAR SURGERY)

## 2025-01-15 PROCEDURE — 63710000001 INSULIN GLARGINE PER 5 UNITS: Performed by: NURSE PRACTITIONER

## 2025-01-15 PROCEDURE — 74177 CT ABD & PELVIS W/CONTRAST: CPT

## 2025-01-15 PROCEDURE — 85025 COMPLETE CBC W/AUTO DIFF WBC: CPT | Performed by: THORACIC SURGERY (CARDIOTHORACIC VASCULAR SURGERY)

## 2025-01-15 RX ORDER — FUROSEMIDE 10 MG/ML
20 INJECTION INTRAMUSCULAR; INTRAVENOUS
Status: DISCONTINUED | OUTPATIENT
Start: 2025-01-15 | End: 2025-01-18 | Stop reason: HOSPADM

## 2025-01-15 RX ORDER — LISINOPRIL 5 MG/1
5 TABLET ORAL
Status: DISCONTINUED | OUTPATIENT
Start: 2025-01-15 | End: 2025-01-18 | Stop reason: HOSPADM

## 2025-01-15 RX ORDER — PANTOPRAZOLE SODIUM 40 MG/1
40 TABLET, DELAYED RELEASE ORAL
Status: DISCONTINUED | OUTPATIENT
Start: 2025-01-16 | End: 2025-01-18 | Stop reason: HOSPADM

## 2025-01-15 RX ORDER — IOPAMIDOL 612 MG/ML
100 INJECTION, SOLUTION INTRAVASCULAR
Status: COMPLETED | OUTPATIENT
Start: 2025-01-15 | End: 2025-01-15

## 2025-01-15 RX ORDER — POTASSIUM CHLORIDE 750 MG/1
20 CAPSULE, EXTENDED RELEASE ORAL 2 TIMES DAILY WITH MEALS
Status: DISCONTINUED | OUTPATIENT
Start: 2025-01-15 | End: 2025-01-18 | Stop reason: HOSPADM

## 2025-01-15 RX ORDER — OXYCODONE HYDROCHLORIDE 5 MG/1
5 TABLET ORAL EVERY 4 HOURS PRN
Status: DISCONTINUED | OUTPATIENT
Start: 2025-01-15 | End: 2025-01-18 | Stop reason: HOSPADM

## 2025-01-15 RX ORDER — OXYCODONE HYDROCHLORIDE 10 MG/1
10 TABLET ORAL EVERY 6 HOURS PRN
Status: DISCONTINUED | OUTPATIENT
Start: 2025-01-15 | End: 2025-01-18 | Stop reason: HOSPADM

## 2025-01-15 RX ADMIN — POTASSIUM CHLORIDE 20 MEQ: 750 CAPSULE, EXTENDED RELEASE ORAL at 17:40

## 2025-01-15 RX ADMIN — INSULIN LISPRO 6 UNITS: 100 INJECTION, SOLUTION INTRAVENOUS; SUBCUTANEOUS at 08:50

## 2025-01-15 RX ADMIN — AMIODARONE HYDROCHLORIDE 200 MG: 200 TABLET ORAL at 08:50

## 2025-01-15 RX ADMIN — FUROSEMIDE 20 MG: 10 INJECTION, SOLUTION INTRAMUSCULAR; INTRAVENOUS at 11:03

## 2025-01-15 RX ADMIN — CITALOPRAM HYDROBROMIDE 10 MG: 20 TABLET ORAL at 08:49

## 2025-01-15 RX ADMIN — LISINOPRIL 5 MG: 5 TABLET ORAL at 09:02

## 2025-01-15 RX ADMIN — POTASSIUM CHLORIDE 20 MEQ: 750 CAPSULE, EXTENDED RELEASE ORAL at 11:03

## 2025-01-15 RX ADMIN — LIDOCAINE 2 PATCH: 0.04 PATCH TOPICAL at 08:51

## 2025-01-15 RX ADMIN — AMIODARONE HYDROCHLORIDE 200 MG: 200 TABLET ORAL at 17:39

## 2025-01-15 RX ADMIN — OXYCODONE HYDROCHLORIDE 10 MG: 10 TABLET ORAL at 09:02

## 2025-01-15 RX ADMIN — CLOPIDOGREL BISULFATE 75 MG: 75 TABLET ORAL at 17:39

## 2025-01-15 RX ADMIN — ENOXAPARIN SODIUM 40 MG: 100 INJECTION SUBCUTANEOUS at 08:49

## 2025-01-15 RX ADMIN — METOPROLOL TARTRATE 12.5 MG: 25 TABLET, FILM COATED ORAL at 20:28

## 2025-01-15 RX ADMIN — CHLORHEXIDINE GLUCONATE 0.12% ORAL RINSE 15 ML: 1.2 LIQUID ORAL at 05:06

## 2025-01-15 RX ADMIN — ACETAMINOPHEN 1000 MG: 500 TABLET, FILM COATED ORAL at 13:49

## 2025-01-15 RX ADMIN — PREGABALIN 25 MG: 25 CAPSULE ORAL at 08:50

## 2025-01-15 RX ADMIN — ACETAMINOPHEN 1000 MG: 500 TABLET, FILM COATED ORAL at 08:50

## 2025-01-15 RX ADMIN — PREGABALIN 25 MG: 25 CAPSULE ORAL at 20:28

## 2025-01-15 RX ADMIN — BISACODYL 10 MG: 5 TABLET, COATED ORAL at 20:28

## 2025-01-15 RX ADMIN — ACETAMINOPHEN 1000 MG: 500 TABLET, FILM COATED ORAL at 20:28

## 2025-01-15 RX ADMIN — BISACODYL 10 MG: 5 TABLET, COATED ORAL at 08:50

## 2025-01-15 RX ADMIN — ATORVASTATIN CALCIUM 20 MG: 10 TABLET, FILM COATED ORAL at 20:28

## 2025-01-15 RX ADMIN — INSULIN GLARGINE 10 UNITS: 100 INJECTION, SOLUTION SUBCUTANEOUS at 20:28

## 2025-01-15 RX ADMIN — OXYCODONE HYDROCHLORIDE 5 MG: 5 TABLET ORAL at 20:32

## 2025-01-15 RX ADMIN — ASPIRIN 81 MG: 81 TABLET, COATED ORAL at 08:49

## 2025-01-15 RX ADMIN — IPRATROPIUM BROMIDE AND ALBUTEROL SULFATE 1.5 ML: 2.5; .5 SOLUTION RESPIRATORY (INHALATION) at 06:52

## 2025-01-15 RX ADMIN — CEFAZOLIN 2 G: 2 INJECTION, POWDER, FOR SOLUTION INTRAMUSCULAR; INTRAVENOUS at 00:23

## 2025-01-15 RX ADMIN — CHLORHEXIDINE GLUCONATE 1 APPLICATION: 500 CLOTH TOPICAL at 04:29

## 2025-01-15 RX ADMIN — ACETAMINOPHEN 1000 MG: 500 TABLET, FILM COATED ORAL at 02:37

## 2025-01-15 RX ADMIN — POLYETHYLENE GLYCOL 3350 17 G: 17 POWDER, FOR SOLUTION ORAL at 08:50

## 2025-01-15 RX ADMIN — PANTOPRAZOLE SODIUM 40 MG: 40 TABLET, DELAYED RELEASE ORAL at 05:06

## 2025-01-15 RX ADMIN — INSULIN LISPRO 4 UNITS: 100 INJECTION, SOLUTION INTRAVENOUS; SUBCUTANEOUS at 20:28

## 2025-01-15 RX ADMIN — METOPROLOL TARTRATE 12.5 MG: 25 TABLET, FILM COATED ORAL at 08:50

## 2025-01-15 RX ADMIN — INSULIN LISPRO 4 UNITS: 100 INJECTION, SOLUTION INTRAVENOUS; SUBCUTANEOUS at 17:40

## 2025-01-15 RX ADMIN — INSULIN GLARGINE 10 UNITS: 100 INJECTION, SOLUTION SUBCUTANEOUS at 09:02

## 2025-01-15 RX ADMIN — Medication 1 APPLICATION: at 05:06

## 2025-01-15 RX ADMIN — INSULIN LISPRO 6 UNITS: 100 INJECTION, SOLUTION INTRAVENOUS; SUBCUTANEOUS at 11:45

## 2025-01-15 RX ADMIN — FUROSEMIDE 20 MG: 10 INJECTION, SOLUTION INTRAMUSCULAR; INTRAVENOUS at 17:39

## 2025-01-15 RX ADMIN — IOPAMIDOL 100 ML: 612 INJECTION, SOLUTION INTRAVENOUS at 13:13

## 2025-01-15 RX ADMIN — Medication 1 APPLICATION: at 17:40

## 2025-01-15 NOTE — THERAPY TREATMENT NOTE
Acute Care - Physical Therapy Treatment Note  [facility]     Patient Name: Nemo Lazaro  : 1960  MRN: [id number]  Today's Date: 1/15/2025      Visit Dx:     ICD-10-CM ICD-9-CM   1. Impaired mobility [Z74.09]  Z74.09 799.89   2. Coronary artery disease of native artery of native heart with stable angina pectoris  I25.118 414.01     413.9     Patient Active Problem List   Diagnosis    Precordial chest pain    Coronary artery disease of native artery of native heart with stable angina pectoris    CAD (coronary artery disease)    HTN (hypertension)    Mixed hyperlipidemia    Type 2 diabetes mellitus    Former smoker    LORENA (obstructive sleep apnea)     Past Medical History:   Diagnosis Date    Anxiety     Arthritis     HIPS, KNEES    Bowel incontinence     pt states sometimes    Cataracts, bilateral     Coronary artery disease     Depression     Diabetes mellitus     type 2    Diverticulosis     GERD (gastroesophageal reflux disease)     History of transfusion     Hyperlipidemia     Hypertension     Kidney stones     Shortness of breath     Sleep apnea     PT USES A CPAP MACHINE    Urinary frequency     AND URGENCY     Past Surgical History:   Procedure Laterality Date    APPENDECTOMY      CARDIAC CATHETERIZATION N/A 10/23/2020    Procedure: Cardiac Catheterization/Vascular Study;  Surgeon: Devante Wang MD;  Location: [facility] CATH INVASIVE LOCATION;  Service: Cardiology;  Laterality: N/A;    CARDIAC CATHETERIZATION Left 10/23/2024    Procedure: Cardiac Catheterization/Vascular Study;  Surgeon: Devante Wang MD;  Location:  [facility] CATH INVASIVE LOCATION;  Service: Cardiology;  Laterality: Left;     SECTION      x2    CHOLECYSTECTOMY      CORONARY ARTERY BYPASS GRAFT N/A 2025    Procedure: CORONARY ARTERY BYPASS GRAFTING X3, LEFT INTERNAL MAMMARY ARTERY GRAFT, RIGHT LEG ENDOSCOPIC VEIN HARVEST, TRANSESOPHAGEAL ECHOCARDIOGRAM, APPLICATION OF PREVENA;  Surgeon: Lam Melgar MD;  Location:   [facility] OR;  Service: Cardiothoracic;  Laterality: N/A;    HYSTERECTOMY       PT Assessment (Last 12 Hours)       PT Evaluation and Treatment       Row Name 01/15/25 0903          Physical Therapy Time and Intention    Subjective Information complains of;pain  -     Document Type therapy note (daily note)  -     Mode of Treatment physical therapy  -       Row Name 01/15/25 0903          General Information    Existing Precautions/Restrictions fall;oxygen therapy device and L/min  chest tube , prevena  -       Row Name 01/15/25 0903          Pain    Pretreatment Pain Rating 8/10  -KELSEY     Posttreatment Pain Rating 8/10  -KELSEY     Pain Location chest  -     Pain Management Interventions exercise or physical activity utilized;premedicated for activity  -       Row Name 01/15/25 0903          Sit-Stand Transfer    Sit-Stand Edgar (Transfers) verbal cues;contact guard  -[facility] Name 01/15/25 0903          Stand-Sit Transfer    Stand-Sit Edgar (Transfers) verbal cues;contact guard  -[facility] Name 01/15/25 0903          Gait/Stairs (Locomotion)    Edgar Level (Gait) contact guard  -     Distance in Feet (Gait) 200  1 standing rest  -     Deviations/Abnormal Patterns (Gait) gait speed decreased;stride length decreased  -       Row Name 01/15/25 0903          Motor Skills    Comments, Therapeutic Exercise protocol x15  -       Row Name             Wound 01/13/25 0819 Right medial leg    Wound - Properties Group Placement Date: 01/13/25  -TJ Placement Time: 0819  -TJ Side: Right  -TJ Orientation: medial  -TJ Location: leg  -TJ Primary Wound Type: Incision  -TJ    Retired Wound - Properties Group Placement Date: 01/13/25  -TJ Placement Time: 0819  -TJ Side: Right  -TJ Orientation: medial  -TJ Location: leg  -TJ Primary Wound Type: Incision  -TJ    Retired Wound - Properties Group Placement Date: 01/13/25  -TJ Placement Time: 0819  -TJ Side: Right  -TJ Orientation: medial  -TJ  Location: leg  -TJ Primary Wound Type: Incision  -TJ    Retired Wound - Properties Group Date first assessed: 01/13/25  -TJ Time first assessed: 0819  -TJ Side: Right  -TJ Location: leg  -TJ Primary Wound Type: Incision  -TJ      Row Name             Wound 01/13/25 0819 Right groin    Wound - Properties Group Placement Date: 01/13/25  -TJ Placement Time: 0819  -TJ Side: Right  -TJ Location: groin  -TJ Primary Wound Type: Puncture  -TJ    Retired Wound - Properties Group Placement Date: 01/13/25  -TJ Placement Time: 0819  -TJ Side: Right  -TJ Location: groin  -TJ Primary Wound Type: Puncture  -TJ    Retired Wound - Properties Group Placement Date: 01/13/25  -TJ Placement Time: 0819  -TJ Side: Right  -TJ Location: groin  -TJ Primary Wound Type: Puncture  -TJ    Retired Wound - Properties Group Date first assessed: 01/13/25  -TJ Time first assessed: 0819  -TJ Side: Right  -TJ Location: groin  -TJ Primary Wound Type: Puncture  -TJ      Row Name             Wound 01/13/25 0858 midline sternal    Wound - Properties Group Placement Date: 01/13/25  -TJ Placement Time: 0858  -TJ Orientation: midline  -TJ Location: sternal  -TJ Primary Wound Type: Incision  -TJ    Retired Wound - Properties Group Placement Date: 01/13/25  -TJ Placement Time: 0858  -TJ Orientation: midline  -TJ Location: sternal  -TJ Primary Wound Type: Incision  -TJ    Retired Wound - Properties Group Placement Date: 01/13/25  -TJ Placement Time: 0858  -TJ Orientation: midline  -TJ Location: sternal  -TJ Primary Wound Type: Incision  -TJ    Retired Wound - Properties Group Date first assessed: 01/13/25  -TJ Time first assessed: 0858  -TJ Location: sternal  -TJ Primary Wound Type: Incision  -TJ      Row Name             Wound 01/13/25 0819 Right lower leg    Wound - Properties Group Placement Date: 01/13/25  -TJ Placement Time: 0819  -TJ Side: Right  -TJ Orientation: lower  -TJ Location: leg  -TJ Primary Wound Type: Puncture  -TJ    Retired Wound - Properties  Group Placement Date: 01/13/25  -TJ Placement Time: 0819 -TJ Side: Right  -TJ Orientation: lower  -TJ Location: leg  -TJ Primary Wound Type: Puncture  -TJ    Retired Wound - Properties Group Placement Date: 01/13/25  -TJ Placement Time: 0819  -TJ Side: Right  -TJ Orientation: lower  -TJ Location: leg  -TJ Primary Wound Type: Puncture  -TJ    Retired Wound - Properties Group Date first assessed: 01/13/25  -TJ Time first assessed: 0819 -TJ Side: Right  -TJ Location: leg  -TJ Primary Wound Type: Puncture  -TJ      Row Name             NPWT (Negative Pressure Wound Therapy) 01/13/25 1250 sternum    NPWT (Negative Pressure Wound Therapy) - Properties Group Placement Date: 01/13/25  -TJ Placement Time: 1250  -TJ Location: sternum  -TJ Additional Comments: prevena  -TJ    Retired NPWT (Negative Pressure Wound Therapy) - Properties Group Placement Date: 01/13/25  -TJ Placement Time: 1250  -TJ Location: sternum  -TJ Additional Comments: prevena  -TJ    Retired NPWT (Negative Pressure Wound Therapy) - Properties Group Placement Date: 01/13/25  -TJ Placement Time: 1250  -TJ Location: sternum  -TJ Additional Comments: prevena  -TJ    Retired NPWT (Negative Pressure Wound Therapy) - Properties Group Placement Date: 01/13/25  -TJ Placement Time: 1250  -TJ Location: sternum  -TJ Additional Comments: prevena  -TJ      Row Name 01/15/25 0903          Vital Signs    Pre Systolic BP Rehab 158  -KELSEY     Pre Treatment Diastolic BP 67  -KELSEY     Post Systolic BP Rehab 146  -KELSEY     Post Treatment Diastolic BP 65  -KELSEY     Pretreatment Heart Rate (beats/min) 70  -KELSEY     Posttreatment Heart Rate (beats/min) 69  -KELSEY     Pre SpO2 (%) 98  -KELSEY     O2 Delivery Pre Treatment --  3 lpm  -KELSEY     Post SpO2 (%) 97  -KELSEY       Row Name 01/15/25 0903          Positioning and Restraints    Pre-Treatment Position sitting in chair/recliner  -KELSEY     In Chair reclined;call light within reach;encouraged to call for assist;with nsg;JESUS elevated;CHRIS  elevated;compression device  -               User Key  (r) = Recorded By, (t) = Taken By, (c) = Cosigned By      Initials Name Provider Type    Anne Momin PTA Physical Therapist Assistant    Valerie Adam RN Registered Nurse                    Physical Therapy Education       Title: PT OT SLP Therapies (In Progress)       Topic: Physical Therapy (In Progress)       Point: Mobility training (Done)       Learning Progress Summary            Patient Acceptance, E, VU,NR by RAF at 1/14/2025 0945    Comment: Benefits of activity, progression of PT, sternal prec                      Point: Home exercise program (Not Started)       Learner Progress:  Not documented in this visit.              Point: Body mechanics (In Progress)       Learning Progress Summary            Patient Acceptance, E,D, NR by KELSEY at 1/15/2025 1027    Comment: purse lip breathing                      Point: Precautions (Done)       Learning Progress Summary            Patient Acceptance, E, VU,NR by RAF at 1/14/2025 0945    Comment: Benefits of activity, progression of PT, sternal prec                                      User Key       Initials Effective Dates Name Provider Type Discipline    RAF 02/03/23 -  Joaquín Shaw PT DPT Physical Therapist PT    KELSEY 02/03/23 -  Anne Patino PTA Physical Therapist Assistant PT                  PT Recommendation and Plan             Time Calculation:    PT Charges       Row Name 01/15/25 1029             Time Calculation    Start Time 0903  -KELSEY      Stop Time 0929  -KELSEY      Time Calculation (min) 26 min  -KELSEY         Timed Charges    69749 - Gait Training Minutes  26  -KELSEY         Total Minutes    Timed Charges Total Minutes 26  -KELSEY       Total Minutes 26  -KELSEY                User Key  (r) = Recorded By, (t) = Taken By, (c) = Cosigned By      Initials Name Provider Type    Anne Momin PTA Physical Therapist Assistant                  Therapy Charges for Today       Code  Description Service Date Service Provider Modifiers Qty    [id number] HC GAIT TRAINING EA 15 MIN 1/15/2025 Anne Patino, PTA GP 2            PT G-Codes  Outcome Measure Options: AM-PAC 6 Clicks Basic Mobility (PT)  AM-PAC 6 Clicks Score (PT): 15    Anne Patino PTA  1/15/2025

## 2025-01-15 NOTE — PROGRESS NOTES
"Patient name: Nemo Lazaro  Patient : 1960  VISIT # [id number]  MR #[id number]    Procedure:Procedure(s):  Coronary artery bypass grafting-3 vessel (left internal mammary artery/left anterior descending, reverse saphenous vein graft/obtuse marginal, and reverse saphenous vein graft/posterior descending artery)  2. Right endoscopic vein harvest  3. Application of Prevena Incision Management  Procedure Date:2025  POD:2 Days Post-Op    Subjective   Sitting up in the chair.  On 2 L/min nasal cannula.  Rates pain 5 out of 10, located to left side of her sternum.  She inadvertently pulled out Bueno catheter overnight and she has voided.  Tolerating clear liquid diet without remark.  No abdominal pain.  She is passing flatus.  Her bowels have not yet moved. FSBG 218-268.  Hypertensive this am.   PT notes reviewed.  Chest x-ray shows no pneumoperitoneum.    IV drips: None  Telemetry: Sinus bradycardia 50s to sinus rhythm in the 60s  Hemodynamics reviewed, stable.  Cardiac output 7, cardiac index 4     Objective     Visit Vitals  /60   Pulse 59   Temp 98 °F (36.7 °C) (Oral)   Resp 20   Ht 163.4 cm (64.33\")   Wt 91.2 kg (201 lb)   SpO2 95%   BMI 34.15 kg/m²   Baseline weight: 181 pounds    Intake/Output Summary (Last 24 hours) at 1/15/2025 0836  Last data filed at 1/15/2025 0636  Gross per 24 hour   Intake 2020.84 ml   Output 1540 ml   Net 480.84 ml     Mediastinal chest tube output: 270 mL / 24 hours  Left pleural Dougie drain output: 80 mL / 24 hours  Serosanguineous fluid, no airleak    Lab:     CBC:  Results from last 7 days   Lab Units 01/15/25  0424 25  1116 25  0558   WBC 10*3/mm3 13.18* 14.31* 12.82*   HEMATOCRIT % 25.0* 26.0* 26.8*   PLATELETS 10*3/mm3 111* 106* 107*          BMP:  Results from last 7 days   Lab Units 01/15/25  0424 25  0947 25  0558   SODIUM mmol/L 132* 133* 133*   POTASSIUM mmol/L 4.6 4.2 4.3   CHLORIDE mmol/L 103 103 102   CO2 mmol/L 20.0* 20.0* " 19.0*   GLUCOSE mg/dL 237* 159* 103*   BUN mg/dL 18 16 17   CREATININE mg/dL 0.89 0.87 0.90          COAG:  Results from last 7 days   Lab Units 01/14/25  0145 01/13/25  1338   INR  1.17* 1.27*   APTT seconds  --  30.6       IMAGES:       Imaging Results (Last 24 Hours)       Procedure Component Value Units Date/Time    XR Chest 1 View [[id number]] Collected: 01/15/25 0719     Updated: 01/15/25 0724    Narrative:      EXAM/TECHNIQUE: XR CHEST 1 VW-     INDICATION: Post-Op Heart Surgery; Z74.09-Other reduced mobility;  I25.118-Atherosclerotic heart disease of native coronary artery with  other forms of angina pectoris     COMPARISON: 1/14/2025     FINDINGS:     Right IJ CVL, mediastinal drains, and left-sided chest tube are stable  in position. Sternotomy wires are stable in alignment.     Cardiac silhouette is within normal limits and stable. No pleural  effusion or visible pneumothorax. Decreased mild left basilar  atelectasis. No new airspace opacity.       Impression:      1.  Support lines/tubes appear stable.  2.  Slight decrease in left basilar atelectasis.     This report was signed and finalized on 1/15/2025 7:21 AM by Dr. Waqas Christianson MD.       CT Abdomen Pelvis Without Contrast [[id number]] Collected: 01/14/25 1047     Updated: 01/14/25 1124    Narrative:      EXAMINATION: CT ABDOMEN PELVIS WO CONTRAST-      1/14/2025 7:33 AM     HISTORY: abnormal chest x ray; I25.118-Atherosclerotic heart disease of  native coronary artery with other forms of angina pectoris     In order to have a CT radiation dose as low as reasonably achievable  Automated Exposure Control was utilized for adjustment of the mA and/or  KV according to patient size.     Total DLP = 714.34 mGy.cm     The CT scan of the abdomen and pelvis should performed with delved  intravenous contrast enhancement.     Images were acquired in axial plane and subsequent reconstruction in  coronal and sagittal planes.     There is no previous similar  study for comparison.     The correlation made with chest radiograph obtained earlier today which  suggested pneumoperitoneum.     There are air locules scattered throughout the peritoneal and  extraperitoneal space under the diaphragm, between the diaphragm and  liver and spleen. These gas locules extend inferiorly along the anterior  abdominal wall which probably intraperitoneal. Air locules around the  the umbilicus and infraumbilical area. There is also soft tissue air  located deep to the subcutaneous space in the right lower abdomen and  right lower lateral gluteal region. 8 partly extends into the right  groin and into the deep intermuscular space in the right upper thigh.  There is also a small air/gas locules in the retroperitoneal space  around the right kidney and IVC.     There is also gas in the pericardial retrosternal space alongside the  mediastinal draining tubes.     There is a loculated gas/air adjacent and posterior to the lower right  rectus abdominal between the urinary bladder and the anterior abdominal  wall. This is in the extraperitoneal space.     Limited visualized lungs show small areas of consolidation in the lower  lungs and a small bibasilar pleural effusion.     Limited visualized cardiomediastinal structures show atheromatous change  of the coronary arteries. There is evidence of recent cardiac surgery.     Unenhanced liver and spleen are unremarkable.     The gallbladder is surgically absent.     Unenhanced pancreas is unremarkable.     There is a low-density mass in the right adrenal gland measuring 4.8 x  3.4 cm. Moderate lobulation left adrenal gland is seen and a smaller  nodule in the left adrenal gland measuring 1.6 cm.     Moderate lobulation renal contour bilaterally is seen. An isodense mass  or lesion may not be evaluated or excluded. No radiopaque calculi. No  hydronephrosis. The ureters are nondilated. Urinary bladder is  decompressed with a Bueno catheter. There is  moderate circumferential  thickening of the wall of the urinary bladder. There is gas fluid level  in the decompressed urinary bladder which may be due to the  catheterization.     Uterus is surgically absent. No adnexal masses.     Stomach and duodenum are normal. Small bowel is nondilated. Appendix is  surgically absent. A mobile cecum is displaced anteriorly posterior to  the anterior abdominal wall. Moderate gas and stool is seen in the  colon. No finding to suggest obstruction.     Severe atheromatous changes of the abdominal aorta and iliac arteries.  No aneurysmal dilatation.     There are small fat-containing inguinal hernias bilaterally.     There is small amount of free fluid in the pelvis.       Impression:      1. Multiple areas of mild-to-moderate intraperitoneal, extraperitoneal,  retroperitoneal and subcutaneous space in the abdomen and pelvis as  detailed above. The source and etiology is not certain. Another  follow-up examination after 24 hours may be obtained and preferably  after oral contrast administration, if the patient is able to tolerate.  2. The right adrenal mass and left adrenal nodule. These may represent  adenomas?.  3. Significant wall thickening of the urinary bladder may partly be due  to decompression. Possibility of chronic cystitis not excluded.  4. Small areas of consolidation/atelectasis in bilateral bases and a  small bibasilar pleural effusion.  5. Small amount of free fluid in the pelvis may be reactive fluid.     The above findings were communicated with Dr. Melgar.                 This report was signed and finalized on 1/14/2025 11:21 AM by Dr. Brigido Mcpherson MD.           Independent review of chest x-ray: No pneumoperitoneum, no pneumothorax, no pleural effusion, mild bibasilar atelectasis      Physical Exam:  General: Alert, oriented. No apparent distress. Up in the chair.  Facial swelling improved.  Cardiovascular: Regular rate and rhythm without murmur, rubs, or  gallops.    Pulmonary: Clear to auscultation bilaterally without wheezing, rubs, or rales.  Chest: Sternotomy incision clean, dry, and intact with prevena. Sternum stable. No clicks.  Mediastinal chest tubes and left pleural drain in place.  Abdomen: Soft, non distended, and non tender.  Extremities: Warm, moves all extremities. Saphenectomy site clean, dry, and intact.  Peripheral edema present.   Neurologic:  Grossly intact with no focal deficits.            Impression:  Coronary artery disease  Stable angina pectoris  Type 2 diabetes mellitus with history of poorly controlled diabetes  Class I obesity  Chronic tobacco use  GERD  Right diaphragm pneumoperitoneum-not visualized on chest x-ray this morning      Plan:  Continue clear liquid diet for now, repeat CT scan of the abdomen pelvis with oral contrast to reassess for worsening right diaphragm pneumoperitoneum.  If stable, will advance diet.  Start lisinopril, start diuresis  Keep mediastinal chest tubes and pleural drain for now  Increase Lantus to 10 units twice daily  Encourage pulmonary toilet and ambulation  Bowel regimen  Routine postcardiac surgery regimen  Discussed with patient and nursing, transfer to  if bed available      Gabby Slaughter, APRTIM  01/15/25  08:36 CST

## 2025-01-15 NOTE — PLAN OF CARE
Goal Outcome Evaluation:  Plan of Care Reviewed With: patient        Progress: no change  Outcome Evaluation: Patient transfered from ICU to [facility]. No c/o pain. Alert and oriented, appears sleepy. Prevena in place. Mediastinal CT and KELSEY drain. Sats WNL on room air.

## 2025-01-15 NOTE — PLAN OF CARE
Problem: Adult Inpatient Plan of Care  Goal: Plan of Care Review  Outcome: Progressing  Goal: Patient-Specific Goal (Individualized)  Outcome: Progressing  Goal: Absence of Hospital-Acquired Illness or Injury  Outcome: Progressing  Intervention: Identify and Manage Fall Risk  Recent Flowsheet Documentation  Taken 1/14/2025 1800 by Donna Green RN  Safety Promotion/Fall Prevention: safety round/check completed  Taken 1/14/2025 1700 by Donna Green RN  Safety Promotion/Fall Prevention: safety round/check completed  Taken 1/14/2025 1600 by Donna Green RN  Safety Promotion/Fall Prevention: safety round/check completed  Taken 1/14/2025 1505 by Donna Green RN  Safety Promotion/Fall Prevention: safety round/check completed  Taken 1/14/2025 1400 by Donna Green RN  Safety Promotion/Fall Prevention: safety round/check completed  Taken 1/14/2025 1300 by Donna Green RN  Safety Promotion/Fall Prevention: safety round/check completed  Taken 1/14/2025 1200 by Donna Green RN  Safety Promotion/Fall Prevention: safety round/check completed  Taken 1/14/2025 1100 by Donna Green RN  Safety Promotion/Fall Prevention: safety round/check completed  Taken 1/14/2025 1000 by Donna Green RN  Safety Promotion/Fall Prevention: safety round/check completed  Taken 1/14/2025 0900 by Donna Green RN  Safety Promotion/Fall Prevention: safety round/check completed  Taken 1/14/2025 0800 by Donna Green RN  Safety Promotion/Fall Prevention: safety round/check completed  Taken 1/14/2025 0700 by Donna Green RN  Safety Promotion/Fall Prevention: safety round/check completed  Intervention: Prevent Skin Injury  Recent Flowsheet Documentation  Taken 1/14/2025 1700 by Donna Green RN  Body Position:   right   tilted  Taken 1/14/2025 1505 by Donna Green RN  Body Position:   supine   turned  Taken 1/14/2025 1300 by Donna Green RN  Body Position:   tilted   right  Taken 1/14/2025 1100 by Donna Green RN  Body  Position:   weight shifting   left  Taken 1/14/2025 0900 by Donna Green RN  Body Position: supine  Taken 1/14/2025 0700 by Donna Green RN  Body Position:   weight shifting   right  Goal: Optimal Comfort and Wellbeing  Outcome: Progressing  Intervention: Provide Person-Centered Care  Recent Flowsheet Documentation  Taken 1/14/2025 1200 by Donna Green RN  Trust Relationship/Rapport:   care explained   choices provided  Goal: Readiness for Transition of Care  Outcome: Progressing     Problem: Skin Injury Risk Increased  Goal: Skin Health and Integrity  Outcome: Progressing  Intervention: Optimize Skin Protection  Recent Flowsheet Documentation  Taken 1/14/2025 1700 by Donna Green RN  Activity Management: up in chair  Head of Bed (HOB) Positioning: HOB at 60 degrees  Taken 1/14/2025 1505 by Donna Green RN  Activity Management:   ambulated outside room   up in chair  Taken 1/14/2025 1300 by Donna Green RN  Activity Management: up in chair  Taken 1/14/2025 1200 by Donna Green RN  Activity Management: up in chair  Taken 1/14/2025 1100 by Donna Green RN  Activity Management: up in chair  Taken 1/14/2025 0900 by Donna Green RN  Activity Management: up in chair  Taken 1/14/2025 0800 by Donna Green RN  Activity Management:   up in chair   ambulated outside room  Taken 1/14/2025 0700 by Donna Green RN  Activity Management: up in chair     Problem: Cardiovascular Surgery  Goal: Improved Activity Tolerance  Outcome: Progressing  Goal: Optimal Coping with Heart Surgery  Outcome: Progressing  Goal: Effective Bowel Elimination  Outcome: Progressing  Goal: Effective Cardiac Function  Outcome: Progressing  Goal: Optimal Cerebral Tissue Perfusion  Outcome: Progressing  Intervention: Protect and Optimize Cerebral Perfusion  Recent Flowsheet Documentation  Taken 1/14/2025 1700 by Donna Green RN  Head of Bed (HOB) Positioning: HOB at 60 degrees  Goal: Fluid and Electrolyte Balance  Outcome:  Progressing  Goal: Blood Glucose Level Within Target Range  Outcome: Progressing  Goal: Absence of Infection Signs and Symptoms  Outcome: Progressing  Goal: Anesthesia/Sedation Recovery  Outcome: Progressing  Intervention: Optimize Anesthesia Recovery  Recent Flowsheet Documentation  Taken 1/14/2025 1800 by Donna Green RN  Safety Promotion/Fall Prevention: safety round/check completed  Taken 1/14/2025 1700 by Donna Green RN  Safety Promotion/Fall Prevention: safety round/check completed  Taken 1/14/2025 1600 by Donna Green RN  Safety Promotion/Fall Prevention: safety round/check completed  Taken 1/14/2025 1505 by Donna Green RN  Safety Promotion/Fall Prevention: safety round/check completed  Taken 1/14/2025 1400 by Donna Green RN  Safety Promotion/Fall Prevention: safety round/check completed  Taken 1/14/2025 1300 by Donna Green RN  Safety Promotion/Fall Prevention: safety round/check completed  Taken 1/14/2025 1200 by Donna Green RN  Safety Promotion/Fall Prevention: safety round/check completed  Taken 1/14/2025 1100 by Donna Green RN  Safety Promotion/Fall Prevention: safety round/check completed  Taken 1/14/2025 1000 by Donna Green RN  Safety Promotion/Fall Prevention: safety round/check completed  Taken 1/14/2025 0900 by Donna Green RN  Safety Promotion/Fall Prevention: safety round/check completed  Taken 1/14/2025 0800 by Donna Green RN  Safety Promotion/Fall Prevention: safety round/check completed  Taken 1/14/2025 0700 by Donna Green RN  Safety Promotion/Fall Prevention: safety round/check completed  Goal: Acceptable Pain Control  Outcome: Progressing  Goal: Nausea and Vomiting Relief  Outcome: Progressing  Goal: Effective Urinary Elimination  Outcome: Progressing  Goal: Effective Oxygenation and Ventilation  Outcome: Progressing  Intervention: Optimize Oxygenation and Ventilation  Recent Flowsheet Documentation  Taken 1/14/2025 1200 by Donna Green RN  Chest Tube  Safety:   all connections secured   suction checked     Problem: Mechanical Ventilation Invasive  Goal: Effective Communication  Outcome: Progressing  Intervention: Ensure Effective Communication  Recent Flowsheet Documentation  Taken 1/14/2025 1200 by Donna Green RN  Trust Relationship/Rapport:   care explained   choices provided  Goal: Optimal Device Function  Outcome: Progressing  Goal: Mechanical Ventilation Liberation  Outcome: Progressing  Goal: Optimal Nutrition Delivery  Outcome: Progressing  Goal: Absence of Device-Related Skin and Tissue Injury  Outcome: Progressing  Goal: Absence of Ventilator-Induced Lung Injury  Outcome: Progressing  Intervention: Prevent Ventilator-Associated Pneumonia  Recent Flowsheet Documentation  Taken 1/14/2025 1700 by Donna Green RN  Head of Bed (HOB) Positioning: HOB at 60 degrees     Problem: Fall Injury Risk  Goal: Absence of Fall and Fall-Related Injury  Outcome: Progressing  Intervention: Promote Injury-Free Environment  Recent Flowsheet Documentation  Taken 1/14/2025 1800 by Donna Green RN  Safety Promotion/Fall Prevention: safety round/check completed  Taken 1/14/2025 1700 by Donna Green RN  Safety Promotion/Fall Prevention: safety round/check completed  Taken 1/14/2025 1600 by Donna Green RN  Safety Promotion/Fall Prevention: safety round/check completed  Taken 1/14/2025 1505 by Donna Green RN  Safety Promotion/Fall Prevention: safety round/check completed  Taken 1/14/2025 1400 by Donna Green RN  Safety Promotion/Fall Prevention: safety round/check completed  Taken 1/14/2025 1300 by Donna Green RN  Safety Promotion/Fall Prevention: safety round/check completed  Taken 1/14/2025 1200 by Donna Green RN  Safety Promotion/Fall Prevention: safety round/check completed  Taken 1/14/2025 1100 by Donna Green RN  Safety Promotion/Fall Prevention: safety round/check completed  Taken 1/14/2025 1000 by Donna Green RN  Safety Promotion/Fall Prevention:  safety round/check completed  Taken 1/14/2025 0900 by Donna Green, NELIA  Safety Promotion/Fall Prevention: safety round/check completed  Taken 1/14/2025 0800 by Donna Green, NELIA  Safety Promotion/Fall Prevention: safety round/check completed  Taken 1/14/2025 0700 by Donna Green RN  Safety Promotion/Fall Prevention: safety round/check completed   Goal Outcome Evaluation:           Progress: improving

## 2025-01-15 NOTE — PAYOR COMM NOTE
"Praveena Lazaro (64 y.o. Female)  [id number]   Continued stay, Clinical update, [facility] [phone]      Date of Birth   1960    Social Security Number       Address   [address] [id number]    Home Phone   [phone]    MRN   [id number]       Adventism   Mandaen    Marital Status                               Admission Date   1/13/25    Admission Type   Elective    Admitting Provider   Lam Melgar MD    Attending Provider   Lam Melgar MD    Department, Room/Bed   [facility], I011/1       Discharge Date       Discharge Disposition       Discharge Destination                                 Attending Provider: Lam Melgar MD    Allergies: Vancomycin, Penicillins    Isolation: None   Infection: None   Code Status: CPR    Ht: 163.4 cm (64.33\")   Wt: 91.2 kg (201 lb)    Admission Cmt: None   Principal Problem: Coronary artery disease of native artery of native heart with stable angina pectoris [I25.118]                   Active Insurance as of 1/13/2025       Primary Coverage       Payor Plan Insurance Group Employer/Plan Group    [facility] MEDICAID [id number]       Payor Plan Address Payor Plan Phone Number Payor Plan Fax Number Effective Dates    [address] [address] [phone]  10/19/2020 - None Entered    [facility] [id number]         Subscriber Name Subscriber Birth Date Member ID       PRAVEENA LAZARO 1960 [id number]                     Emergency Contacts        (Rel.) Home Phone Work Phone Mobile Phone    SOLITARIO LAZRAO (Spouse) [phone] -- --    Sean De -- -- [phone]    Laura Lazaro -- -- [phone]    Oneil Lazaro -- -- [phone]              Vital Signs (last day)       Date/Time Temp Temp src Pulse Resp BP Patient Position SpO2    01/15/25 0657 -- -- 59 -- -- -- --    01/15/25 0652 -- -- 58 -- -- -- 95    01/15/25 0645 " -- -- 59 -- 141/60 -- 96    01/15/25 0615 -- -- 57 -- 147/69 -- 97    01/15/25 0600 -- -- 60 -- 143/62 -- 96    01/15/25 0530 -- -- 56 -- 141/59 -- 97    01/15/25 0515 -- -- 56 -- 146/57 -- 97    01/15/25 0500 -- -- 57 -- 139/64 -- 95    01/15/25 0415 -- -- 55 -- 125/54 -- 96    01/15/25 0400 -- -- 52 -- 140/58 -- 96    01/15/25 0345 -- -- 53 -- 128/58 -- 96    01/15/25 0330 -- -- 53 -- 135/59 -- 96    01/15/25 0315 -- -- 55 -- 130/58 -- 95    01/15/25 0300 -- -- 54 -- 131/54 -- 95    01/15/25 0245 -- -- 56 -- 143/56 -- 95    01/15/25 0230 -- -- 54 -- 129/61 -- 96    01/15/25 0215 -- -- 54 -- 131/55 -- 96    01/15/25 0200 -- -- 54 -- 120/55 -- 91    01/15/25 0145 -- -- 54 -- 136/50 -- 95    01/15/25 0130 -- -- 53 -- 133/55 -- 96    01/15/25 0115 -- -- 53 -- 110/53 -- 96    01/15/25 0100 -- -- 54 -- 113/60 -- 96    01/15/25 0045 -- -- 52 -- 131/55 -- 96    01/15/25 0030 -- -- 52 -- 124/55 -- 96    01/15/25 0015 -- -- 52 -- 121/55 -- 95    01/15/25 0000 98 (36.7) Oral 53 20 113/55 Lying 95    01/14/25 2345 -- -- 53 -- 125/61 -- 95 01/14/25 2330 -- -- 54 -- 124/53 -- 97    01/14/25 2315 -- -- 54 -- 126/53 -- 95    01/14/25 2300 -- -- 53 -- 114/53 -- 96    01/14/25 2245 -- -- 52 -- 119/54 -- 98    01/14/25 2230 -- -- 53 -- 114/57 -- 96    01/14/25 2215 -- -- 52 -- -- -- 95    01/14/25 2200 -- -- 50 -- 111/48 -- 95    01/14/25 2145 -- -- 51 -- 113/55 -- 95    01/14/25 2130 -- -- 51 -- 111/51 -- 94    01/14/25 2127 -- -- 51 -- 130/58 -- 92    01/14/25 2124 -- -- 57 -- 119/49 -- --    01/14/25 2100 -- -- 52 -- 122/56 -- 97    01/14/25 2045 -- -- 54 -- 119/48 -- 96    01/14/25 2030 -- -- 59 -- 133/59 -- 96    01/14/25 2015 -- -- 58 -- 121/55 -- 95    01/14/25 2001 -- -- 58 19 -- -- 100    01/14/25 1955 -- -- 58 20 -- -- 98    01/14/25 1949 99.6 (37.6) Bladder 59 19 -- -- 94    01/14/25 1945 -- -- 58 -- 125/49 -- 95    01/14/25 1930 -- -- 59 -- 116/60 -- 95    01/14/25 1915 -- -- 58 -- 132/57 -- 96    01/14/25 1900 -- --  57 -- 124/54 -- 94    01/14/25 1701 -- -- 57 -- 137/41 -- 90    01/14/25 1700 -- -- 56 -- 117/53 -- 91    01/14/25 1600 98.2 (36.8) Oral 54 -- 124/60 -- 96    01/14/25 1508 -- -- 54 16 -- -- 98    01/14/25 1505 -- -- 54 16 -- -- 94    01/14/25 1300 -- -- 59 -- 109/56 -- 93    01/14/25 1200 97.7 (36.5) Oral 54 -- 107/57 -- 92    01/14/25 1130 -- -- 58 -- 114/50 -- 93    01/14/25 1115 -- -- 59 -- 121/48 -- 97    01/14/25 1114 -- -- -- 19 -- -- --    01/14/25 1110 -- -- -- 17 -- -- --    01/14/25 1100 -- -- 61 -- 120/47 -- 92    01/14/25 1000 -- -- 65 -- 131/52 -- 91    01/14/25 0945 -- -- 65 -- 133/54 -- 92    01/14/25 0930 -- -- 64 -- 133/55 -- 95    01/14/25 0915 -- -- 64 -- 115/58 -- 95    01/14/25 0900 -- -- 64 -- 129/56 -- 91    01/14/25 0845 -- -- 67 -- 156/63 -- 90    01/14/25 0815 -- -- 66 -- 134/60 -- 91    01/14/25 0800 98.2 (36.8) Oral 63 -- 124/64 -- 95    01/14/25 0700 -- -- -- -- -- -- 99    01/14/25 0652 -- -- -- 18 -- -- 95    01/14/25 0615 -- -- 61 -- 113/52 -- 92    01/14/25 0600 99.8 (37.7) Bladder 60 18 124/55 -- 90    01/14/25 0545 -- -- 61 -- 116/54 -- 91    01/14/25 0530 -- -- 63 -- 121/55 -- 95    01/14/25 0515 -- -- 63 -- 125/58 -- 92    01/14/25 0500 -- -- 64 17 127/61 -- --    01/14/25 0445 -- -- 63 -- 120/54 -- --    01/14/25 0430 -- -- 65 -- -- -- --    01/14/25 0415 -- -- 71 -- -- -- --    01/14/25 0400 100.1 (37.8) Bladder 65 17 130/55 -- 94    01/14/25 0345 -- -- 64 -- 122/61 -- 95    01/14/25 0330 -- -- 65 -- 136/55 -- 96    01/14/25 0315 -- -- 68 -- 136/64 -- 96    01/14/25 0300 100.3 (37.9) -- 70 16 133/63 -- 96    01/14/25 0245 -- -- 72 -- 146/58 -- 97    01/14/25 0230 -- -- 71 -- 139/60 -- 96    01/14/25 0215 -- -- 77 -- 133/48 -- 94    01/14/25 0200 100.5 (38.1) Bladder 76 18 133/56 -- 96    01/14/25 0145 -- -- 77 -- 134/56 -- 96    01/14/25 0130 -- -- 77 -- -- -- 96    01/14/25 0115 -- -- 74 -- 123/49 -- 96    01/14/25 0100 100.3 (37.9) Bladder 75 15 119/51 -- 96    01/14/25 0045  -- -- 73 -- 113/58 -- 96    01/14/25 0030 -- -- 75 -- 110/57 -- 96    01/14/25 0015 -- -- 75 -- 99/72 -- 95    01/14/25 0000 100.2 (37.9) Bladder 69 17 112/49 -- 96          Current Facility-Administered Medications   Medication Dose Route Frequency Provider Last Rate Last Admin    acetaminophen (TYLENOL) tablet 1,000 mg  1,000 mg Oral Q6H Lam Melgar MD   1,000 mg at 01/15/25 0850    Or    acetaminophen (TYLENOL) 160 MG/5ML oral solution 1,000 mg  1,000 mg Oral Q6H Lam Melgar MD        Or    acetaminophen (TYLENOL) suppository 650 mg  650 mg Rectal Q6H Lam Melgar MD        amiodarone (PACERONE) tablet 200 mg  200 mg Oral BID With Meals Lam Melgar MD   200 mg at 01/15/25 0850    aspirin EC tablet 81 mg  81 mg Oral Daily Lam Melgar MD   81 mg at 01/15/25 0849    atorvastatin (LIPITOR) tablet 20 mg  20 mg Oral Nightly Lam Melgar MD   20 mg at 01/14/25 2011    bisacodyl (DULCOLAX) EC tablet 10 mg  10 mg Oral BID Lam Melgar MD   10 mg at 01/15/25 0850    Chlorhexidine Gluconate Cloth 2 % pads 1 Application  1 Application Topical Q24H Lam Melgar MD   1 Application at 01/15/25 0429    citalopram (CeleXA) tablet 10 mg  10 mg Oral Daily Lam Melgar MD   10 mg at 01/15/25 0849    clopidogrel (PLAVIX) tablet 75 mg  75 mg Oral Daily Lam Melgar MD   75 mg at 01/14/25 1701    dextrose (D50W) (25 g/50 mL) IV injection 25 g  25 g Intravenous Q15 Min PRN Lam Melgar MD        dextrose (GLUTOSE) oral gel 15 g  15 g Oral Q15 Min PRN Lam Melgar MD        Enoxaparin Sodium (LOVENOX) syringe 40 mg  40 mg Subcutaneous Daily Lam Melgar MD   40 mg at 01/15/25 0849    furosemide (LASIX) injection 20 mg  20 mg Intravenous BID Gabby Patel APRN        glucagon (GLUCAGEN) injection 1 mg  1 mg Intramuscular Q15 Min PRN Lam Melgar MD        Hold All immunizations   Not Applicable Continuous PRLam Akers MD         insulin glargine (LANTUS, SEMGLEE) injection 10 Units  10 Units Subcutaneous Q12H Gabby Slaughter APRN        Insulin Lispro (humaLOG) injection 2-9 Units  2-9 Units Subcutaneous 4x Daily AC & at Bedtime Lam Melgar MD   6 Units at 01/15/25 0850    Lidocaine 4 % 2 patch  2 patch Transdermal Q24H Lam Melgar MD   2 patch at 01/15/25 0851    lisinopril (PRINIVIL,ZESTRIL) tablet 5 mg  5 mg Oral Q24H Gabby Slaughter APRN        metoprolol tartrate (LOPRESSOR) tablet 12.5 mg  12.5 mg Oral Q12H Lam Melgar MD   12.5 mg at 01/15/25 0850    mupirocin (BACTROBAN) 2 % nasal ointment 1 Application  1 Application Each Nare BID Lam Melgar MD   1 Application at 01/15/25 0506    ondansetron (ZOFRAN) injection 4 mg  4 mg Intravenous Q6H PRN Lam Melgar MD   4 mg at 25    oxyCODONE (ROXICODONE) immediate release tablet 10 mg  10 mg Oral Q6H PRN Gabby Slaughter APRN        oxyCODONE (ROXICODONE) immediate release tablet 5 mg  5 mg Oral Q4H PRN Gabby Slaughter APRN        [START ON 2025] pantoprazole (PROTONIX) EC tablet 40 mg  40 mg Oral Q AM Gabby Slaughter APRN        polyethylene glycol (MIRALAX) packet 17 g  17 g Oral Daily Lam Melgar MD   17 g at 01/15/25 0850    potassium chloride (MICRO-K/KLOR-CON) CR capsule  20 mEq Oral BID With Meals Gabby Slaughter APRN        pregabalin (LYRICA) capsule 25 mg  25 mg Oral Q12H Lam Melgar MD   25 mg at 01/15/25 0850        Physician Progress Notes (last 48 hours)        Gabby Slaughter APRN at 01/15/25 0836          Patient name: Nemo Lazaro  Patient : 1960  VISIT # [id number]  MR #[id number]    Procedure:Procedure(s):  Coronary artery bypass grafting-3 vessel (left internal mammary artery/left anterior descending, reverse saphenous vein graft/obtuse marginal, and reverse saphenous vein graft/posterior descending artery)  2. Right endoscopic vein harvest  3. Application of  "Prevena Incision Management  Procedure Date:1/13/2025  POD:2 Days Post-Op    Subjective   Sitting up in the chair.  On 2 L/min nasal cannula.  Rates pain 5 out of 10, located to left side of her sternum.  She inadvertently pulled out Bueno catheter overnight and she has voided.  Tolerating clear liquid diet without remark.  No abdominal pain.  She is passing flatus.  Her bowels have not yet moved. FSBG 218-268.  Hypertensive this am.   PT notes reviewed.  Chest x-ray shows no pneumoperitoneum.    IV drips: None  Telemetry: Sinus bradycardia 50s to sinus rhythm in the 60s  Hemodynamics reviewed, stable.  Cardiac output 7, cardiac index 4    Objective     Visit Vitals  /60   Pulse 59   Temp 98 °F (36.7 °C) (Oral)   Resp 20   Ht 163.4 cm (64.33\")   Wt 91.2 kg (201 lb)   SpO2 95%   BMI 34.15 kg/m²   Baseline weight: 181 pounds    Intake/Output Summary (Last 24 hours) at 1/15/2025 0836  Last data filed at 1/15/2025 0636  Gross per 24 hour   Intake 2020.84 ml   Output 1540 ml   Net 480.84 ml     Mediastinal chest tube output: 270 mL / 24 hours  Left pleural Dougie drain output: 80 mL / 24 hours  Serosanguineous fluid, no airleak    Lab:     CBC:  Results from last 7 days   Lab Units 01/15/25  0424 01/14/25  1116 01/14/25  0558   WBC 10*3/mm3 13.18* 14.31* 12.82*   HEMATOCRIT % 25.0* 26.0* 26.8*   PLATELETS 10*3/mm3 111* 106* 107*          BMP:  Results from last 7 days   Lab Units 01/15/25  0424 01/14/25  0947 01/14/25  0558   SODIUM mmol/L 132* 133* 133*   POTASSIUM mmol/L 4.6 4.2 4.3   CHLORIDE mmol/L 103 103 102   CO2 mmol/L 20.0* 20.0* 19.0*   GLUCOSE mg/dL 237* 159* 103*   BUN mg/dL 18 16 17   CREATININE mg/dL 0.89 0.87 0.90          COAG:  Results from last 7 days   Lab Units 01/14/25  0145 01/13/25  1338   INR  1.17* 1.27*   APTT seconds  --  30.6       IMAGES:       Imaging Results (Last 24 Hours)       Procedure Component Value Units Date/Time    XR Chest 1 View [[id number]] Collected: 01/15/25 0719     " Updated: 01/15/25 0724    Narrative:      EXAM/TECHNIQUE: XR CHEST 1 VW-     INDICATION: Post-Op Heart Surgery; Z74.09-Other reduced mobility;  I25.118-Atherosclerotic heart disease of native coronary artery with  other forms of angina pectoris     COMPARISON: 1/14/2025     FINDINGS:     Right IJ CVL, mediastinal drains, and left-sided chest tube are stable  in position. Sternotomy wires are stable in alignment.     Cardiac silhouette is within normal limits and stable. No pleural  effusion or visible pneumothorax. Decreased mild left basilar  atelectasis. No new airspace opacity.       Impression:      1.  Support lines/tubes appear stable.  2.  Slight decrease in left basilar atelectasis.     This report was signed and finalized on 1/15/2025 7:21 AM by Dr. Waqas Christianson MD.       CT Abdomen Pelvis Without Contrast [[id number]] Collected: 01/14/25 1047     Updated: 01/14/25 1124    Narrative:      EXAMINATION: CT ABDOMEN PELVIS WO CONTRAST-      1/14/2025 7:33 AM     HISTORY: abnormal chest x ray; I25.118-Atherosclerotic heart disease of  native coronary artery with other forms of angina pectoris     In order to have a CT radiation dose as low as reasonably achievable  Automated Exposure Control was utilized for adjustment of the mA and/or  KV according to patient size.     Total DLP = 714.34 mGy.cm     The CT scan of the abdomen and pelvis should performed with delved  intravenous contrast enhancement.     Images were acquired in axial plane and subsequent reconstruction in  coronal and sagittal planes.     There is no previous similar study for comparison.     The correlation made with chest radiograph obtained earlier today which  suggested pneumoperitoneum.     There are air locules scattered throughout the peritoneal and  extraperitoneal space under the diaphragm, between the diaphragm and  liver and spleen. These gas locules extend inferiorly along the anterior  abdominal wall which probably  intraperitoneal. Air locules around the  the umbilicus and infraumbilical area. There is also soft tissue air  located deep to the subcutaneous space in the right lower abdomen and  right lower lateral gluteal region. 8 partly extends into the right  groin and into the deep intermuscular space in the right upper thigh.  There is also a small air/gas locules in the retroperitoneal space  around the right kidney and IVC.     There is also gas in the pericardial retrosternal space alongside the  mediastinal draining tubes.     There is a loculated gas/air adjacent and posterior to the lower right  rectus abdominal between the urinary bladder and the anterior abdominal  wall. This is in the extraperitoneal space.     Limited visualized lungs show small areas of consolidation in the lower  lungs and a small bibasilar pleural effusion.     Limited visualized cardiomediastinal structures show atheromatous change  of the coronary arteries. There is evidence of recent cardiac surgery.     Unenhanced liver and spleen are unremarkable.     The gallbladder is surgically absent.     Unenhanced pancreas is unremarkable.     There is a low-density mass in the right adrenal gland measuring 4.8 x  3.4 cm. Moderate lobulation left adrenal gland is seen and a smaller  nodule in the left adrenal gland measuring 1.6 cm.     Moderate lobulation renal contour bilaterally is seen. An isodense mass  or lesion may not be evaluated or excluded. No radiopaque calculi. No  hydronephrosis. The ureters are nondilated. Urinary bladder is  decompressed with a Bueno catheter. There is moderate circumferential  thickening of the wall of the urinary bladder. There is gas fluid level  in the decompressed urinary bladder which may be due to the  catheterization.     Uterus is surgically absent. No adnexal masses.     Stomach and duodenum are normal. Small bowel is nondilated. Appendix is  surgically absent. A mobile cecum is displaced anteriorly  posterior to  the anterior abdominal wall. Moderate gas and stool is seen in the  colon. No finding to suggest obstruction.     Severe atheromatous changes of the abdominal aorta and iliac arteries.  No aneurysmal dilatation.     There are small fat-containing inguinal hernias bilaterally.     There is small amount of free fluid in the pelvis.       Impression:      1. Multiple areas of mild-to-moderate intraperitoneal, extraperitoneal,  retroperitoneal and subcutaneous space in the abdomen and pelvis as  detailed above. The source and etiology is not certain. Another  follow-up examination after 24 hours may be obtained and preferably  after oral contrast administration, if the patient is able to tolerate.  2. The right adrenal mass and left adrenal nodule. These may represent  adenomas?.  3. Significant wall thickening of the urinary bladder may partly be due  to decompression. Possibility of chronic cystitis not excluded.  4. Small areas of consolidation/atelectasis in bilateral bases and a  small bibasilar pleural effusion.  5. Small amount of free fluid in the pelvis may be reactive fluid.     The above findings were communicated with Dr. Melgar.                 This report was signed and finalized on 1/14/2025 11:21 AM by Dr. Brigido Mcpherson MD.           Independent review of chest x-ray: No pneumoperitoneum, no pneumothorax, no pleural effusion, mild bibasilar atelectasis      Physical Exam:  General: Alert, oriented. No apparent distress. Up in the chair.  Facial swelling improved.  Cardiovascular: Regular rate and rhythm without murmur, rubs, or gallops.    Pulmonary: Clear to auscultation bilaterally without wheezing, rubs, or rales.  Chest: Sternotomy incision clean, dry, and intact with prevena. Sternum stable. No clicks.  Mediastinal chest tubes and left pleural drain in place.  Abdomen: Soft, non distended, and non tender.  Extremities: Warm, moves all extremities. Saphenectomy site clean, dry, and  intact.  Peripheral edema present.   Neurologic:  Grossly intact with no focal deficits.           Impression:  Coronary artery disease  Stable angina pectoris  Type 2 diabetes mellitus with history of poorly controlled diabetes  Class I obesity  Chronic tobacco use  GERD  Right diaphragm pneumoperitoneum-not visualized on chest x-ray this morning      Plan:  Continue clear liquid diet for now, repeat CT scan of the abdomen pelvis with oral contrast to reassess for worsening right diaphragm pneumoperitoneum.  If stable, will advance diet.  Start lisinopril, start diuresis  Keep mediastinal chest tubes and pleural drain for now  Increase Lantus to 10 units twice daily  Encourage pulmonary toilet and ambulation  Bowel regimen  Routine postcardiac surgery regimen  Discussed with patient and nursing, transfer to  if bed available      OZIEL Odom  01/15/25  08:36 CST       Electronically signed by Gabby Slaughter APRN at 01/15/25 0846       Gabby Slaughter APRN at 25 0842          Patient name: Nemo Lazaro  Patient : 1960  VISIT # [id number]  MR #[id number]    Procedure:Procedure(s):  Coronary artery bypass grafting-3 vessel (left internal mammary artery/left anterior descending, reverse saphenous vein graft/obtuse marginal, and reverse saphenous vein graft/posterior descending artery)  2. Right endoscopic vein harvest  3. Application of Prevena Incision Management  Procedure Date:2025  POD:1 Day Post-Op    Subjective   Extubated overnight.  Currently on room air and up in the chair.  No complaints of pain this morning.  Chest x-ray showed right pneumoperitoneum and Dr. Melgar was contacted earlier.  She reports history of GERD with ulcers and diverticulosis.  Says if she misses a dose of omeprazole then she usually ends up in the hospital.  Reports she stopped taking omeprazole for a few days as she ran out of the medication and could not afford to buy anymore.   "Additional overnight events include facial swelling believed to be from vancomycin allergy.  Was placed on epinephrine infusion but this has since been discontinued.  Due to walk with PT.  No family present.    IV drips: Insulin, nitroglycerin, amiodarone  Telemetry: Sinus 60s  Hemodynamics reviewed, stable.  Cardiac output 5, cardiac index 2.7    Objective     Visit Vitals  /52   Pulse 61   Temp 99.8 °F (37.7 °C) (Bladder)   Resp 18   Ht 163.4 cm (64.33\")   Wt 89.8 kg (198 lb)   SpO2 92%   BMI 33.64 kg/m²   Baseline weight: 181 pounds    Intake/Output Summary (Last 24 hours) at 1/14/2025 0843  Last data filed at 1/14/2025 0600  Gross per 24 hour   Intake 4171.58 ml   Output 1850 ml   Net 2321.58 ml     Mediastinal chest tube output: 370 mL / 24 hours  Left pleural Dougie drain output: 70 mL / 24 hours  Serosanguineous fluid, no airleak    Lab:     CBC:  Results from last 7 days   Lab Units 01/14/25  0558 01/13/25  1709 01/13/25  1338   WBC 10*3/mm3 12.82* 12.82* 9.60   HEMATOCRIT % 26.8* 28.4* 27.1*   PLATELETS 10*3/mm3 107* 127* 121*          BMP:  Results from last 7 days   Lab Units 01/14/25  0558 01/13/25  1709 01/13/25  1338   SODIUM mmol/L 133* 138 138   POTASSIUM mmol/L 4.3 4.3 5.2   CHLORIDE mmol/L 102 108* 108*   CO2 mmol/L 19.0* 21.0* 22.0   GLUCOSE mg/dL 103* 152* 161*   BUN mg/dL 17 13 11   CREATININE mg/dL 0.90 0.85 0.86          COAG:  Results from last 7 days   Lab Units 01/14/25  0145 01/13/25  1338   INR  1.17* 1.27*   APTT seconds  --  30.6       IMAGES:       Imaging Results (Last 24 Hours)       Procedure Component Value Units Date/Time    CT Abdomen Pelvis Without Contrast [[id number]] Resulted: 01/14/25 0833     Updated: 01/14/25 0842    XR Chest 1 View [[id number]] Collected: 01/14/25 0702     Updated: 01/14/25 0712    Narrative:      EXAMINATION: XR CHEST 1 VW-     1/14/2025 2:10 AM     HISTORY: Post-Op Heart Surgery; I25.118-Atherosclerotic heart disease of  native coronary artery with " other forms of angina pectoris     A frontal projection of the chest is compared with the previous study  dated 1/13/2025.     The lungs are poorly expanded.     Endotracheal tube has been removed since the previous study. The  left-sided chest tube, right internal jugular venous access catheter and  mediastinal drain are in place.     There is blunting of the left lateral costophrenic sulcus which may  suggest a small pleural effusion. This was not seen in the previous  study.     There is no pulmonary congestion or pneumothorax.     The heart size is not optimally visualized due to the AP projection.  There is evidence of previous cardiac surgery.     A small amount of pneumoperitoneum is seen under the right diaphragm  which was not seen in the previous study.     No acute bony abnormality.       Impression:      1. A small pneumoperitoneum under the right diaphragm which was not  noted in the previous study obtained 1 day ago. The etiology is not  certain. This may be clinically correlated.  2. Poor lung expansion. Probable small pleural effusion at the left  base. The tubes and lines in place.     The above report was immediately called to ICU and given to the nurse in  charge of the patient at 7:08 a.m.        This report was signed and finalized on 1/14/2025 7:09 AM by Dr. Brigido Mcpherson MD.       XR Chest 1 View [[id number]] Collected: 01/13/25 1436     Updated: 01/13/25 1441    Narrative:      EXAMINATION:  XR CHEST 1 VW-  1/13/2025 1:08 PM     HISTORY: Post-Op Check Line & Tube Placement; I25.118-Atherosclerotic  heart disease of native coronary artery with other forms of angina  pectoris.     COMPARISON: 1/10/2025.     TECHNIQUE: Single view AP image.     FINDINGS: The patient is intubated with the endotracheal tube tip at the  T3-4 level. There is a right IJ central venous catheter and sheath.  There are 2 mediastinal drains and a left chest tube. There is no  evidence of pneumothorax. There is  hypoventilation with vascular  crowding. There are opacities in both lung bases. Heart size is  prominent.          Impression:      1. Status post heart surgery with placement of various lines and tubes,  as described.  2. Hypoventilation with vascular crowding.  3. Opacities in both lung bases likely due to atelectasis.  4. Heart size is prominent.           This report was signed and finalized on 1/13/2025 2:38 PM by Dr. Jose Hanna MD.           Independent review of chest x-ray: Low lung volumes, no pneumothorax, right pneumoperitoneum under right diaphragm-new finding      Physical Exam:  General: Alert, oriented. No apparent distress. Up in the chair.  Facial swelling improved.  Cardiovascular: Regular rate and rhythm without murmur, rubs, or gallops.    Pulmonary: Clear to auscultation bilaterally without wheezing, rubs, or rales.  Chest: Sternotomy incision clean, dry, and intact with prevena. Sternum stable. No clicks.  Mediastinal chest tubes and left pleural drain in place.  Abdomen: Soft, non distended, and non tender.  Extremities: Warm, moves all extremities. Saphenectomy site clean, dry, and intact.   Neurologic:  Grossly intact with no focal deficits.           Impression:  Coronary artery disease  Stable angina pectoris  Type 2 diabetes mellitus with history of poorly controlled diabetes  Class I obesity  Chronic tobacco use  GERD  Right diaphragm pneumoperitoneum      Plan:  N.p.o., stat CT scan of the abdomen and pelvis without contrast for right diaphragm pneumoperitoneum  Lactate now  Repeat labs this afternoon  Vancomycin has been added as an allergy, Dr. Melgar discussed with pharmacy staff.  Okay to continue Ancef.  Encourage pulmonary toilet and ambulation  Routine postcardiac surgery regimen  Further recommendations forthcoming after review of CT scan of the abdomen, discussed with patient and nursing      Gabby Slaughter, OZIEL  01/14/25  08:43 CST       Electronically signed by  Gabby Slaughter, APRN at 01/14/25 0830

## 2025-01-15 NOTE — PLAN OF CARE
Goal Outcome Evaluation:      Patient stood and ambulated 200' with CGA and 2 standing rests. SATs 98 - 97% on 2 lpm. Reviewed sternal restrictions.

## 2025-01-15 NOTE — PLAN OF CARE
Goal Outcome Evaluation:      Patient stood and ambulated 200' with CGA and 1 standing rest. SATs 98 -95% on 2 lpm. Reviewed sternal restrictions.

## 2025-01-15 NOTE — PLAN OF CARE
Goal Outcome Evaluation:  Plan of Care Reviewed With: patient        Progress: improving            Problem: Adult Inpatient Plan of Care  Goal: Plan of Care Review  Outcome: Progressing  Flowsheets (Taken 1/15/2025 0518)  Progress: improving  Plan of Care Reviewed With: patient  Goal: Patient-Specific Goal (Individualized)  Outcome: Progressing  Goal: Absence of Hospital-Acquired Illness or Injury  Outcome: Progressing  Intervention: Identify and Manage Fall Risk  Recent Flowsheet Documentation  Taken 1/15/2025 0500 by Marilu Shannon RN  Safety Promotion/Fall Prevention: safety round/check completed  Taken 1/15/2025 0400 by Marilu Shannon RN  Safety Promotion/Fall Prevention: safety round/check completed  Taken 1/15/2025 0300 by Marilu Shannon RN  Safety Promotion/Fall Prevention: safety round/check completed  Taken 1/15/2025 0200 by Marilu Shannon RN  Safety Promotion/Fall Prevention: safety round/check completed  Taken 1/15/2025 0100 by Marilu Shannon RN  Safety Promotion/Fall Prevention: safety round/check completed  Taken 1/15/2025 0000 by Marilu Shannon RN  Safety Promotion/Fall Prevention: safety round/check completed  Taken 1/14/2025 2300 by Marilu Shannon RN  Safety Promotion/Fall Prevention: safety round/check completed  Taken 1/14/2025 2200 by Marilu Shannon RN  Safety Promotion/Fall Prevention: safety round/check completed  Taken 1/14/2025 2100 by Marilu Shannon RN  Safety Promotion/Fall Prevention: safety round/check completed  Taken 1/14/2025 2000 by Marilu Shannon RN  Safety Promotion/Fall Prevention: safety round/check completed  Taken 1/14/2025 1900 by Marilu Shannon RN  Safety Promotion/Fall Prevention: safety round/check completed  Intervention: Prevent Skin Injury  Recent Flowsheet Documentation  Taken 1/15/2025 0500 by Marilu Shannon RN  Body Position:   tilted   left  Taken 1/15/2025 0300 by Marilu Shannon RN  Body Position:   turned   right  Taken 1/15/2025 0100 by  Marilu Shannon RN  Body Position:   turned   left  Taken 1/14/2025 2300 by Marilu Shannon RN  Body Position:   turned   supine  Taken 1/14/2025 2100 by Marilu Shannon RN  Body Position:   tilted   right  Taken 1/14/2025 2003 by Marilu Shannon RN  Skin Protection:   incontinence pads utilized   silicone foam dressing in place  Taken 1/14/2025 1900 by Marilu Shannon RN  Body Position:   tilted   left  Intervention: Prevent and Manage VTE (Venous Thromboembolism) Risk  Recent Flowsheet Documentation  Taken 1/14/2025 2003 by Marilu Shannon RN  VTE Prevention/Management: (see mar) other (see comments)  Goal: Optimal Comfort and Wellbeing  Outcome: Progressing  Intervention: Provide Person-Centered Care  Recent Flowsheet Documentation  Taken 1/15/2025 0400 by Marilu Shannon RN  Trust Relationship/Rapport: care explained  Taken 1/15/2025 0000 by Marilu Shannon RN  Trust Relationship/Rapport: care explained  Taken 1/14/2025 2003 by Marilu Shannon RN  Trust Relationship/Rapport:   care explained   questions answered  Goal: Readiness for Transition of Care  Outcome: Progressing  Intervention: Mutually Develop Transition Plan  Recent Flowsheet Documentation  Taken 1/14/2025 2019 by Marilu Shannon RN  Equipment Currently Used at Home: cpap

## 2025-01-16 ENCOUNTER — APPOINTMENT (OUTPATIENT)
Dept: GENERAL RADIOLOGY | Facility: HOSPITAL | Age: 65
DRG: 236 | End: 2025-01-16
Payer: COMMERCIAL

## 2025-01-16 LAB
ALBUMIN SERPL-MCNC: 3.6 G/DL (ref 3.5–5.2)
ALBUMIN/GLOB SERPL: 1.6 G/DL
ALP SERPL-CCNC: 53 U/L (ref 39–117)
ALT SERPL W P-5'-P-CCNC: 12 U/L (ref 1–33)
ANION GAP SERPL CALCULATED.3IONS-SCNC: 10 MMOL/L (ref 5–15)
AST SERPL-CCNC: 19 U/L (ref 1–32)
BASOPHILS # BLD AUTO: 0.03 10*3/MM3 (ref 0–0.2)
BASOPHILS NFR BLD AUTO: 0.2 % (ref 0–1.5)
BILIRUB SERPL-MCNC: 0.4 MG/DL (ref 0–1.2)
BUN SERPL-MCNC: 15 MG/DL (ref 8–23)
BUN/CREAT SERPL: 18.3 (ref 7–25)
CALCIUM SPEC-SCNC: 8.8 MG/DL (ref 8.6–10.5)
CHLORIDE SERPL-SCNC: 100 MMOL/L (ref 98–107)
CO2 SERPL-SCNC: 23 MMOL/L (ref 22–29)
CREAT SERPL-MCNC: 0.82 MG/DL (ref 0.57–1)
DEPRECATED RDW RBC AUTO: 46.6 FL (ref 37–54)
EGFRCR SERPLBLD CKD-EPI 2021: 80 ML/MIN/1.73
EOSINOPHIL # BLD AUTO: 0.29 10*3/MM3 (ref 0–0.4)
EOSINOPHIL NFR BLD AUTO: 2.1 % (ref 0.3–6.2)
ERYTHROCYTE [DISTWIDTH] IN BLOOD BY AUTOMATED COUNT: 13.7 % (ref 12.3–15.4)
GLOBULIN UR ELPH-MCNC: 2.2 GM/DL
GLUCOSE BLDC GLUCOMTR-MCNC: 161 MG/DL (ref 70–130)
GLUCOSE BLDC GLUCOMTR-MCNC: 226 MG/DL (ref 70–130)
GLUCOSE BLDC GLUCOMTR-MCNC: 262 MG/DL (ref 70–130)
GLUCOSE BLDC GLUCOMTR-MCNC: 270 MG/DL (ref 70–130)
GLUCOSE SERPL-MCNC: 125 MG/DL (ref 65–99)
HCT VFR BLD AUTO: 27.9 % (ref 34–46.6)
HGB BLD-MCNC: 9.1 G/DL (ref 12–15.9)
IMM GRANULOCYTES # BLD AUTO: 0.16 10*3/MM3 (ref 0–0.05)
IMM GRANULOCYTES NFR BLD AUTO: 1.2 % (ref 0–0.5)
LYMPHOCYTES # BLD AUTO: 1.95 10*3/MM3 (ref 0.7–3.1)
LYMPHOCYTES NFR BLD AUTO: 14.3 % (ref 19.6–45.3)
MCH RBC QN AUTO: 30.3 PG (ref 26.6–33)
MCHC RBC AUTO-ENTMCNC: 32.6 G/DL (ref 31.5–35.7)
MCV RBC AUTO: 93 FL (ref 79–97)
MONOCYTES # BLD AUTO: 0.74 10*3/MM3 (ref 0.1–0.9)
MONOCYTES NFR BLD AUTO: 5.4 % (ref 5–12)
NEUTROPHILS NFR BLD AUTO: 10.49 10*3/MM3 (ref 1.7–7)
NEUTROPHILS NFR BLD AUTO: 76.8 % (ref 42.7–76)
PLATELET # BLD AUTO: 131 10*3/MM3 (ref 140–450)
PMV BLD AUTO: 11.3 FL (ref 6–12)
POTASSIUM SERPL-SCNC: 3.8 MMOL/L (ref 3.5–5.2)
PROT SERPL-MCNC: 5.8 G/DL (ref 6–8.5)
RBC # BLD AUTO: 3 10*6/MM3 (ref 3.77–5.28)
SODIUM SERPL-SCNC: 133 MMOL/L (ref 136–145)
WBC NRBC COR # BLD AUTO: 13.66 10*3/MM3 (ref 3.4–10.8)

## 2025-01-16 PROCEDURE — 25010000002 FUROSEMIDE PER 20 MG: Performed by: NURSE PRACTITIONER

## 2025-01-16 PROCEDURE — 71046 X-RAY EXAM CHEST 2 VIEWS: CPT

## 2025-01-16 PROCEDURE — 63710000001 INSULIN GLARGINE PER 5 UNITS: Performed by: NURSE PRACTITIONER

## 2025-01-16 PROCEDURE — 25010000002 ENOXAPARIN PER 10 MG: Performed by: THORACIC SURGERY (CARDIOTHORACIC VASCULAR SURGERY)

## 2025-01-16 PROCEDURE — 63710000001 INSULIN LISPRO (HUMAN) PER 5 UNITS: Performed by: THORACIC SURGERY (CARDIOTHORACIC VASCULAR SURGERY)

## 2025-01-16 PROCEDURE — 80053 COMPREHEN METABOLIC PANEL: CPT | Performed by: THORACIC SURGERY (CARDIOTHORACIC VASCULAR SURGERY)

## 2025-01-16 PROCEDURE — 97116 GAIT TRAINING THERAPY: CPT

## 2025-01-16 PROCEDURE — 82948 REAGENT STRIP/BLOOD GLUCOSE: CPT

## 2025-01-16 PROCEDURE — 85025 COMPLETE CBC W/AUTO DIFF WBC: CPT | Performed by: THORACIC SURGERY (CARDIOTHORACIC VASCULAR SURGERY)

## 2025-01-16 RX ADMIN — PREGABALIN 25 MG: 25 CAPSULE ORAL at 20:44

## 2025-01-16 RX ADMIN — ATORVASTATIN CALCIUM 20 MG: 10 TABLET, FILM COATED ORAL at 20:44

## 2025-01-16 RX ADMIN — INSULIN GLARGINE 10 UNITS: 100 INJECTION, SOLUTION SUBCUTANEOUS at 20:46

## 2025-01-16 RX ADMIN — INSULIN LISPRO 6 UNITS: 100 INJECTION, SOLUTION INTRAVENOUS; SUBCUTANEOUS at 12:40

## 2025-01-16 RX ADMIN — OXYCODONE HYDROCHLORIDE 10 MG: 10 TABLET ORAL at 02:53

## 2025-01-16 RX ADMIN — PANTOPRAZOLE SODIUM 40 MG: 40 TABLET, DELAYED RELEASE ORAL at 05:39

## 2025-01-16 RX ADMIN — CITALOPRAM HYDROBROMIDE 10 MG: 20 TABLET ORAL at 09:50

## 2025-01-16 RX ADMIN — CLOPIDOGREL BISULFATE 75 MG: 75 TABLET ORAL at 17:49

## 2025-01-16 RX ADMIN — POTASSIUM CHLORIDE 20 MEQ: 750 CAPSULE, EXTENDED RELEASE ORAL at 09:50

## 2025-01-16 RX ADMIN — OXYCODONE HYDROCHLORIDE 10 MG: 10 TABLET ORAL at 20:45

## 2025-01-16 RX ADMIN — ACETAMINOPHEN 1000 MG: 500 TABLET, FILM COATED ORAL at 09:50

## 2025-01-16 RX ADMIN — Medication 1 APPLICATION: at 05:39

## 2025-01-16 RX ADMIN — AMIODARONE HYDROCHLORIDE 200 MG: 200 TABLET ORAL at 09:50

## 2025-01-16 RX ADMIN — ASPIRIN 81 MG: 81 TABLET, COATED ORAL at 09:50

## 2025-01-16 RX ADMIN — METOPROLOL TARTRATE 12.5 MG: 25 TABLET, FILM COATED ORAL at 09:51

## 2025-01-16 RX ADMIN — Medication 1 APPLICATION: at 17:49

## 2025-01-16 RX ADMIN — FUROSEMIDE 20 MG: 10 INJECTION, SOLUTION INTRAMUSCULAR; INTRAVENOUS at 09:50

## 2025-01-16 RX ADMIN — LIDOCAINE 2 PATCH: 0.04 PATCH TOPICAL at 09:51

## 2025-01-16 RX ADMIN — LISINOPRIL 5 MG: 5 TABLET ORAL at 09:50

## 2025-01-16 RX ADMIN — PREGABALIN 25 MG: 25 CAPSULE ORAL at 09:50

## 2025-01-16 RX ADMIN — INSULIN LISPRO 2 UNITS: 100 INJECTION, SOLUTION INTRAVENOUS; SUBCUTANEOUS at 09:50

## 2025-01-16 RX ADMIN — ENOXAPARIN SODIUM 40 MG: 100 INJECTION SUBCUTANEOUS at 09:51

## 2025-01-16 RX ADMIN — INSULIN GLARGINE 10 UNITS: 100 INJECTION, SOLUTION SUBCUTANEOUS at 09:51

## 2025-01-16 RX ADMIN — ACETAMINOPHEN 1000 MG: 500 TABLET, FILM COATED ORAL at 20:45

## 2025-01-16 RX ADMIN — AMIODARONE HYDROCHLORIDE 200 MG: 200 TABLET ORAL at 17:49

## 2025-01-16 RX ADMIN — INSULIN LISPRO 4 UNITS: 100 INJECTION, SOLUTION INTRAVENOUS; SUBCUTANEOUS at 20:45

## 2025-01-16 RX ADMIN — METOPROLOL TARTRATE 12.5 MG: 25 TABLET, FILM COATED ORAL at 20:44

## 2025-01-16 RX ADMIN — FUROSEMIDE 20 MG: 10 INJECTION, SOLUTION INTRAMUSCULAR; INTRAVENOUS at 17:49

## 2025-01-16 RX ADMIN — INSULIN LISPRO 6 UNITS: 100 INJECTION, SOLUTION INTRAVENOUS; SUBCUTANEOUS at 17:49

## 2025-01-16 RX ADMIN — POTASSIUM CHLORIDE 20 MEQ: 750 CAPSULE, EXTENDED RELEASE ORAL at 17:49
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
## 2025-01-16 NOTE — CASE MANAGEMENT/SOCIAL WORK
Continued Stay Note   [place]     Patient Name: Nemo Lazaro  MRN: [id number]  Today's Date: 1/16/2025    Admit Date: 1/13/2025    Plan: Home   Discharge Plan       Row Name 01/16/25 1403       Plan    Plan Home    Patient/Family in Agreement with Plan yes    Plan Comments Pt doing well, walked 200' with P.T. Pt will return home with spouse upon discharge. No needs identified at present. Will follow.    Final Discharge Disposition Code 01 - home or self-care                   Discharge Codes    No documentation.                       ROCHELLE BeanW

## 2025-01-16 NOTE — PLAN OF CARE
Goal Outcome Evaluation:      Patient is extremely fatigued. Patient requires mod assist for side lying to sit. Patient stands with SBA. Ambulated very slowly for 200' with 1 standing rest.

## 2025-01-16 NOTE — PLAN OF CARE
Goal Outcome Evaluation:      Patient stands and sits from recliner and toilet with CGA. Ambulated 250' with CGA. RA SATs 96 - 94%.Reviewed sternal restrictions.

## 2025-01-16 NOTE — PLAN OF CARE
Goal Outcome Evaluation:    Problem: Adult Inpatient Plan of Care  Goal: Plan of Care Review  Outcome: Progressing  Flowsheets (Taken 1/16/2025 0401)  Progress: no change  Outcome Evaluation: Pain treated with PRN Arlette with relief. Brittanys c/d/i. MST ~ 30 mls out KELSEY ~ 35 mls out, as of this time. SR/SB 58-68 per tele. Ambulating in samuel with standing rests. Plan of care ongoing.  Plan of Care Reviewed With: patient

## 2025-01-16 NOTE — PLAN OF CARE
Goal Outcome Evaluation:              Outcome Evaluation: Pt in room with . POD 3. 12 lb above baseline wt. Reports appetite as good. Intake in the last 72 hrs: 38%; PO fluids: 347 mL. Denies any N/V, diarrhea, or difficulty chewing/swallowing. Pt received information and educational material on heart-healthy diet, best foods for health, and low-fat cooking methods. Pt and  report eating well at home and understand the nutritional implications of heart disease. Pt noted both her mother and brother had similar procedures. Last BM 1/15. New labs: Na 133, A1C 7.30, Mg 2.5, Glu 161. 2+ edema in feet. Dietitian encouraged pt to follow up if she had any questions, advised on available snacks, and encouraged eating well while inpatient. Anticipated D/C tomorrow. Will monitor.

## 2025-01-17 ENCOUNTER — APPOINTMENT (OUTPATIENT)
Dept: GENERAL RADIOLOGY | Facility: HOSPITAL | Age: 65
DRG: 236 | End: 2025-01-17
Payer: COMMERCIAL

## 2025-01-17 LAB
ANION GAP SERPL CALCULATED.3IONS-SCNC: 11 MMOL/L (ref 5–15)
BUN SERPL-MCNC: 14 MG/DL (ref 8–23)
BUN/CREAT SERPL: 20 (ref 7–25)
CALCIUM SPEC-SCNC: 8.9 MG/DL (ref 8.6–10.5)
CHLORIDE SERPL-SCNC: 98 MMOL/L (ref 98–107)
CO2 SERPL-SCNC: 26 MMOL/L (ref 22–29)
CREAT SERPL-MCNC: 0.7 MG/DL (ref 0.57–1)
DEPRECATED RDW RBC AUTO: 44 FL (ref 37–54)
DEPRECATED RDW RBC AUTO: 44.6 FL (ref 37–54)
EGFRCR SERPLBLD CKD-EPI 2021: 96.7 ML/MIN/1.73
ERYTHROCYTE [DISTWIDTH] IN BLOOD BY AUTOMATED COUNT: 13.2 % (ref 12.3–15.4)
ERYTHROCYTE [DISTWIDTH] IN BLOOD BY AUTOMATED COUNT: 13.3 % (ref 12.3–15.4)
GLUCOSE BLDC GLUCOMTR-MCNC: 231 MG/DL (ref 70–130)
GLUCOSE BLDC GLUCOMTR-MCNC: 309 MG/DL (ref 70–130)
GLUCOSE BLDC GLUCOMTR-MCNC: 315 MG/DL (ref 70–130)
GLUCOSE BLDC GLUCOMTR-MCNC: 384 MG/DL (ref 70–130)
GLUCOSE SERPL-MCNC: 162 MG/DL (ref 65–99)
HCT VFR BLD AUTO: 26 % (ref 34–46.6)
HCT VFR BLD AUTO: 27.1 % (ref 34–46.6)
HGB BLD-MCNC: 8.8 G/DL (ref 12–15.9)
HGB BLD-MCNC: 9.1 G/DL (ref 12–15.9)
MCH RBC QN AUTO: 31 PG (ref 26.6–33)
MCH RBC QN AUTO: 31 PG (ref 26.6–33)
MCHC RBC AUTO-ENTMCNC: 33.6 G/DL (ref 31.5–35.7)
MCHC RBC AUTO-ENTMCNC: 33.8 G/DL (ref 31.5–35.7)
MCV RBC AUTO: 91.5 FL (ref 79–97)
MCV RBC AUTO: 92.2 FL (ref 79–97)
PLATELET # BLD AUTO: 130 10*3/MM3 (ref 140–450)
PLATELET # BLD AUTO: 145 10*3/MM3 (ref 140–450)
PMV BLD AUTO: 11.7 FL (ref 6–12)
PMV BLD AUTO: 11.9 FL (ref 6–12)
POTASSIUM SERPL-SCNC: 3.9 MMOL/L (ref 3.5–5.2)
RBC # BLD AUTO: 2.84 10*6/MM3 (ref 3.77–5.28)
RBC # BLD AUTO: 2.94 10*6/MM3 (ref 3.77–5.28)
SODIUM SERPL-SCNC: 135 MMOL/L (ref 136–145)
WBC NRBC COR # BLD AUTO: 8.73 10*3/MM3 (ref 3.4–10.8)
WBC NRBC COR # BLD AUTO: 9 10*3/MM3 (ref 3.4–10.8)

## 2025-01-17 PROCEDURE — 63710000001 INSULIN GLARGINE PER 5 UNITS: Performed by: NURSE PRACTITIONER

## 2025-01-17 PROCEDURE — 82948 REAGENT STRIP/BLOOD GLUCOSE: CPT

## 2025-01-17 PROCEDURE — 85027 COMPLETE CBC AUTOMATED: CPT | Performed by: NURSE PRACTITIONER

## 2025-01-17 PROCEDURE — 71046 X-RAY EXAM CHEST 2 VIEWS: CPT

## 2025-01-17 PROCEDURE — 97116 GAIT TRAINING THERAPY: CPT

## 2025-01-17 PROCEDURE — 63710000001 INSULIN LISPRO (HUMAN) PER 5 UNITS: Performed by: THORACIC SURGERY (CARDIOTHORACIC VASCULAR SURGERY)

## 2025-01-17 PROCEDURE — 25010000002 FUROSEMIDE PER 20 MG: Performed by: NURSE PRACTITIONER

## 2025-01-17 PROCEDURE — 80048 BASIC METABOLIC PNL TOTAL CA: CPT | Performed by: NURSE PRACTITIONER

## 2025-01-17 PROCEDURE — 25010000002 ENOXAPARIN PER 10 MG: Performed by: THORACIC SURGERY (CARDIOTHORACIC VASCULAR SURGERY)

## 2025-01-17 RX ADMIN — ACETAMINOPHEN 1000 MG: 500 TABLET, FILM COATED ORAL at 09:43

## 2025-01-17 RX ADMIN — INSULIN GLARGINE 10 UNITS: 100 INJECTION, SOLUTION SUBCUTANEOUS at 21:23

## 2025-01-17 RX ADMIN — METFORMIN HYDROCHLORIDE 500 MG: 500 TABLET ORAL at 18:04

## 2025-01-17 RX ADMIN — Medication 1 APPLICATION: at 05:46

## 2025-01-17 RX ADMIN — METOPROLOL TARTRATE 12.5 MG: 25 TABLET, FILM COATED ORAL at 21:22

## 2025-01-17 RX ADMIN — ASPIRIN 81 MG: 81 TABLET, COATED ORAL at 09:43

## 2025-01-17 RX ADMIN — FUROSEMIDE 20 MG: 10 INJECTION, SOLUTION INTRAMUSCULAR; INTRAVENOUS at 09:43

## 2025-01-17 RX ADMIN — OXYCODONE HYDROCHLORIDE 10 MG: 10 TABLET ORAL at 19:03

## 2025-01-17 RX ADMIN — AMIODARONE HYDROCHLORIDE 200 MG: 200 TABLET ORAL at 18:04

## 2025-01-17 RX ADMIN — ACETAMINOPHEN 1000 MG: 500 TABLET, FILM COATED ORAL at 18:04

## 2025-01-17 RX ADMIN — INSULIN LISPRO 7 UNITS: 100 INJECTION, SOLUTION INTRAVENOUS; SUBCUTANEOUS at 12:34

## 2025-01-17 RX ADMIN — INSULIN LISPRO 7 UNITS: 100 INJECTION, SOLUTION INTRAVENOUS; SUBCUTANEOUS at 18:04

## 2025-01-17 RX ADMIN — INSULIN LISPRO 8 UNITS: 100 INJECTION, SOLUTION INTRAVENOUS; SUBCUTANEOUS at 21:23

## 2025-01-17 RX ADMIN — AMIODARONE HYDROCHLORIDE 200 MG: 200 TABLET ORAL at 09:43

## 2025-01-17 RX ADMIN — CLOPIDOGREL BISULFATE 75 MG: 75 TABLET ORAL at 18:04

## 2025-01-17 RX ADMIN — PANTOPRAZOLE SODIUM 40 MG: 40 TABLET, DELAYED RELEASE ORAL at 05:46

## 2025-01-17 RX ADMIN — INSULIN LISPRO 4 UNITS: 100 INJECTION, SOLUTION INTRAVENOUS; SUBCUTANEOUS at 09:44

## 2025-01-17 RX ADMIN — METOPROLOL TARTRATE 12.5 MG: 25 TABLET, FILM COATED ORAL at 09:43

## 2025-01-17 RX ADMIN — ENOXAPARIN SODIUM 40 MG: 100 INJECTION SUBCUTANEOUS at 09:43

## 2025-01-17 RX ADMIN — CITALOPRAM HYDROBROMIDE 10 MG: 20 TABLET ORAL at 09:43

## 2025-01-17 RX ADMIN — PREGABALIN 25 MG: 25 CAPSULE ORAL at 09:43

## 2025-01-17 RX ADMIN — LIDOCAINE 2 PATCH: 0.04 PATCH TOPICAL at 09:44

## 2025-01-17 RX ADMIN — INSULIN GLARGINE 10 UNITS: 100 INJECTION, SOLUTION SUBCUTANEOUS at 09:44

## 2025-01-17 RX ADMIN — PREGABALIN 25 MG: 25 CAPSULE ORAL at 21:22

## 2025-01-17 RX ADMIN — LISINOPRIL 5 MG: 5 TABLET ORAL at 09:44

## 2025-01-17 RX ADMIN — ATORVASTATIN CALCIUM 20 MG: 10 TABLET, FILM COATED ORAL at 21:22

## 2025-01-17 RX ADMIN — POTASSIUM CHLORIDE 20 MEQ: 750 CAPSULE, EXTENDED RELEASE ORAL at 09:43

## 2025-01-17 RX ADMIN — FUROSEMIDE 20 MG: 10 INJECTION, SOLUTION INTRAMUSCULAR; INTRAVENOUS at 18:04

## 2025-01-17 RX ADMIN — POTASSIUM CHLORIDE 20 MEQ: 750 CAPSULE, EXTENDED RELEASE ORAL at 18:04

## 2025-01-17 NOTE — THERAPY TREATMENT NOTE
Acute Care - Physical Therapy Treatment Note  [facility]     Patient Name: Nemo Lazaro  : 1960  MRN: [id number]  Today's Date: 2025      Visit Dx:     ICD-10-CM ICD-9-CM   1. Impaired mobility [Z74.09]  Z74.09 799.89   2. Coronary artery disease of native artery of native heart with stable angina pectoris  I25.118 414.01     413.9     Patient Active Problem List   Diagnosis    Precordial chest pain    Coronary artery disease of native artery of native heart with stable angina pectoris    CAD (coronary artery disease)    HTN (hypertension)    Mixed hyperlipidemia    Type 2 diabetes mellitus    Former smoker    LORENA (obstructive sleep apnea)     Past Medical History:   Diagnosis Date    Anxiety     Arthritis     HIPS, KNEES    Bowel incontinence     pt states sometimes    Cataracts, bilateral     Coronary artery disease     Depression     Diabetes mellitus     type 2    Diverticulosis     GERD (gastroesophageal reflux disease)     History of transfusion     Hyperlipidemia     Hypertension     Kidney stones     Shortness of breath     Sleep apnea     PT USES A CPAP MACHINE    Urinary frequency     AND URGENCY     Past Surgical History:   Procedure Laterality Date    APPENDECTOMY      CARDIAC CATHETERIZATION N/A 10/23/2020    Procedure: Cardiac Catheterization/Vascular Study;  Surgeon: Devante Wang MD;  Location: [facility] CATH INVASIVE LOCATION;  Service: Cardiology;  Laterality: N/A;    CARDIAC CATHETERIZATION Left 10/23/2024    Procedure: Cardiac Catheterization/Vascular Study;  Surgeon: Devante Wang MD;  Location:  [facility] CATH INVASIVE LOCATION;  Service: Cardiology;  Laterality: Left;     SECTION      x2    CHOLECYSTECTOMY      CORONARY ARTERY BYPASS GRAFT N/A 2025    Procedure: CORONARY ARTERY BYPASS GRAFTING X3, LEFT INTERNAL MAMMARY ARTERY GRAFT, RIGHT LEG ENDOSCOPIC VEIN HARVEST, TRANSESOPHAGEAL ECHOCARDIOGRAM, APPLICATION OF PREVENA;  Surgeon: Lam Melgar MD;  Location:   [facility] OR;  Service: Cardiothoracic;  Laterality: N/A;    HYSTERECTOMY       PT Assessment (Last 12 Hours)       PT Evaluation and Treatment       Row Name 01/17/25 1140          Physical Therapy Time and Intention    Subjective Information complains of;fatigue;pain  -KELSEY     Document Type therapy note (daily note)  -KELSEY     Mode of Treatment physical therapy  -KELSEY       Row Name 01/17/25 1140          General Information    Existing Precautions/Restrictions fall;sternal  -KELSEY       Row Name 01/17/25 1140          Pain    Pretreatment Pain Rating 4/10  -KELSEY     Posttreatment Pain Rating 6/10  -KELSEY     Pain Location back  -KELSEY       Row Name 01/17/25 1140          Sit-Stand Transfer    Sit-Stand Morris (Transfers) standby assist  -KELSEY       Row Name 01/17/25 1140          Stand-Sit Transfer    Stand-Sit Morris (Transfers) standby assist  -KELSEY       Row Name 01/17/25 1140          Gait/Stairs (Locomotion)    Morris Level (Gait) contact guard  -KELSEY     Distance in Feet (Gait) 250  -KELSEY     Deviations/Abnormal Patterns (Gait) gait speed decreased;stride length decreased  -       Row Name             Wound 01/13/25 0819 Right medial leg    Wound - Properties Group Placement Date: 01/13/25  -TJ Placement Time: 0819  -TJ Side: Right  -TJ Orientation: medial  -TJ Location: leg  -TJ Primary Wound Type: Incision  -TJ    Retired Wound - Properties Group Placement Date: 01/13/25  -TJ Placement Time: 0819  -TJ Side: Right  -TJ Orientation: medial  -TJ Location: leg  -TJ Primary Wound Type: Incision  -TJ    Retired Wound - Properties Group Placement Date: 01/13/25  -TJ Placement Time: 0819  -TJ Side: Right  -TJ Orientation: medial  -TJ Location: leg  -TJ Primary Wound Type: Incision  -TJ    Retired Wound - Properties Group Date first assessed: 01/13/25  -TJ Time first assessed: 0819  -TJ Side: Right  -TJ Location: leg  -TJ Primary Wound Type: Incision  -TJ      Row Name             Wound 01/13/25 0819 Right groin     Wound - Properties Group Placement Date: 01/13/25  -TJ Placement Time: 0819  -TJ Side: Right  -TJ Location: groin  -TJ Primary Wound Type: Puncture  -TJ    Retired Wound - Properties Group Placement Date: 01/13/25  -TJ Placement Time: 0819  -TJ Side: Right  -TJ Location: groin  -TJ Primary Wound Type: Puncture  -TJ    Retired Wound - Properties Group Placement Date: 01/13/25  -TJ Placement Time: 0819  -TJ Side: Right  -TJ Location: groin  -TJ Primary Wound Type: Puncture  -TJ    Retired Wound - Properties Group Date first assessed: 01/13/25  -TJ Time first assessed: 0819  -TJ Side: Right  -TJ Location: groin  -TJ Primary Wound Type: Puncture  -TJ      Row Name             Wound 01/13/25 0858 midline sternal    Wound - Properties Group Placement Date: 01/13/25  -TJ Placement Time: 0858  -TJ Orientation: midline  -TJ Location: sternal  -TJ Primary Wound Type: Incision  -TJ    Retired Wound - Properties Group Placement Date: 01/13/25  -TJ Placement Time: 0858  -TJ Orientation: midline  -TJ Location: sternal  -TJ Primary Wound Type: Incision  -TJ    Retired Wound - Properties Group Placement Date: 01/13/25  -TJ Placement Time: 0858  -TJ Orientation: midline  -TJ Location: sternal  -TJ Primary Wound Type: Incision  -TJ    Retired Wound - Properties Group Date first assessed: 01/13/25  -TJ Time first assessed: 0858  -TJ Location: sternal  -TJ Primary Wound Type: Incision  -TJ      Row Name             Wound 01/13/25 0819 Right lower leg    Wound - Properties Group Placement Date: 01/13/25  -TJ Placement Time: 0819  -TJ Side: Right  -TJ Orientation: lower  -TJ Location: leg  -TJ Primary Wound Type: Puncture  -TJ    Retired Wound - Properties Group Placement Date: 01/13/25  -TJ Placement Time: 0819  -TJ Side: Right  -TJ Orientation: lower  -TJ Location: leg  -TJ Primary Wound Type: Puncture  -TJ    Retired Wound - Properties Group Placement Date: 01/13/25  -TJ Placement Time: 0819  -TJ Side: Right  -TJ Orientation: lower   -TJ Location: leg  -TJ Primary Wound Type: Puncture  -TJ    Retired Wound - Properties Group Date first assessed: 01/13/25  -TJ Time first assessed: 0819 -TJ Side: Right  -TJ Location: leg  -TJ Primary Wound Type: Puncture  -TJ      Row Name             NPWT (Negative Pressure Wound Therapy) 01/13/25 1250 sternum    NPWT (Negative Pressure Wound Therapy) - Properties Group Placement Date: 01/13/25  -TJ Placement Time: 1250  -TJ Location: sternum  -TJ Additional Comments: prevena  -TJ    Retired NPWT (Negative Pressure Wound Therapy) - Properties Group Placement Date: 01/13/25  -TJ Placement Time: 1250  -TJ Location: sternum  -TJ Additional Comments: prevena  -TJ    Retired NPWT (Negative Pressure Wound Therapy) - Properties Group Placement Date: 01/13/25  -TJ Placement Time: 1250  -TJ Location: sternum  -TJ Additional Comments: prevena  -TJ    Retired NPWT (Negative Pressure Wound Therapy) - Properties Group Placement Date: 01/13/25  -TJ Placement Time: 1250  -TJ Location: sternum  -TJ Additional Comments: prevena  -TJ      Row Name 01/17/25 1140          Positioning and Restraints    Pre-Treatment Position sitting in chair/recliner  -KELSEY     In Chair reclined;call light within reach;encouraged to call for assist;with family/caregiver  -KELSEY               User Key  (r) = Recorded By, (t) = Taken By, (c) = Cosigned By      Initials Name Provider Type    Anne Momin PTA Physical Therapist Assistant    Valerie Adam, RN Registered Nurse                    Physical Therapy Education       Title: PT OT SLP Therapies (In Progress)       Topic: Physical Therapy (In Progress)       Point: Mobility training (Done)       Learning Progress Summary            Patient Acceptance, E, VU,NR by RAF at 1/14/2025 7215    Comment: Benefits of activity, progression of PT, sternal prec                      Point: Home exercise program (Not Started)       Learner Progress:  Not documented in this visit.              Point:  Body mechanics (In Progress)       Learning Progress Summary            Patient Acceptance, E,D, NR by KELSEY at 1/15/2025 1027    Comment: purse lip breathing                      Point: Precautions (In Progress)       Learning Progress Summary            Patient Acceptance, E,D, NR by KELSEY at 1/17/2025 1154    Comment: sternal restrictions    Acceptance, E,D, NR by KELSEY at 1/16/2025 1143    Comment: sternal    Acceptance, E, VU,NR by RAF at 1/14/2025 0945    Comment: Benefits of activity, progression of PT, sternal prec                                      User Key       Initials Effective Dates Name Provider Type Discipline    RAF 02/03/23 -  Joaquín Shaw, PT DPT Physical Therapist PT    KELSEY 02/03/23 -  Anne Patino PTA Physical Therapist Assistant PT                  PT Recommendation and Plan         Outcome Measures       Row Name 01/17/25 1100 01/16/25 1100          How much help from another person do you currently need...    Turning from your back to your side while in flat bed without using bedrails? 4  -KELSEY 3  -KELSEY     Moving from lying on back to sitting on the side of a flat bed without bedrails? 3  -KELSEY 4  -KELSEY     Moving to and from a bed to a chair (including a wheelchair)? 4  -KELSEY 4  -KELSEY     Standing up from a chair using your arms (e.g., wheelchair, bedside chair)? 4  -KELSEY 4  no arms  -KELSEY     Climbing 3-5 steps with a railing? 3  -KELSEY 3  -KELSEY     To walk in hospital room? 3  -KELSEY 3  -KELSEY     AM-PAC 6 Clicks Score (PT) 21  -KELSEY 21  -KELSEY               User Key  (r) = Recorded By, (t) = Taken By, (c) = Cosigned By      Initials Name Provider Type    KELSEY Anne Patino, JEAN-PIERRE Physical Therapist Assistant                     Time Calculation:    PT Charges       Row Name 01/17/25 1155             Time Calculation    Start Time 1110  -KELSEY      Stop Time 1125  -KELSEY      Time Calculation (min) 15 min  -KELSEY         Timed Charges    45377 - Gait Training Minutes  15  -KELSEY         Total Minutes    Timed Charges Total Minutes  15  -KELSEY       Total Minutes 15  -KELSEY                User Key  (r) = Recorded By, (t) = Taken By, (c) = Cosigned By      Initials Name Provider Type    Anne Momin, JEAN-PIERRE Physical Therapist Assistant                  Therapy Charges for Today       Code Description Service Date Service Provider Modifiers Qty    00291063596 HC GAIT TRAINING EA 15 MIN 1/16/2025 Anne Patino, JEAN-PIERRE GP 2    42053200323 HC GAIT TRAINING EA 15 MIN 1/16/2025 Anne Patino, JEAN-PIERRE GP 1    81450485589 HC GAIT TRAINING EA 15 MIN 1/17/2025 Anne Patino, JEAN-PIERRE GP 1            PT G-Codes  Outcome Measure Options: AM-PAC 6 Clicks Basic Mobility (PT)  AM-PAC 6 Clicks Score (PT): 21    Anne Patino PTA  1/17/2025

## 2025-01-17 NOTE — PLAN OF CARE
Goal Outcome Evaluation:      Patient ambulated 250' with CGA and 1 standing rest. C/O back pain. Reviewed sternal restrictions.

## 2025-01-17 NOTE — PLAN OF CARE
Goal Outcome Evaluation:         Patient ambulated 275' with SBA and 1 standing rest. Reviewed sternal restrictions with patient and spouse.

## 2025-01-17 NOTE — PROGRESS NOTES
"Patient name: Nemo Lazaro  Patient : 1960  VISIT # [id number]  MR #[id number]    Procedure:Procedure(s):  Coronary artery bypass grafting-3 vessel (left internal mammary artery/left anterior descending, reverse saphenous vein graft/obtuse marginal, and reverse saphenous vein graft/posterior descending artery)  2. Right endoscopic vein harvest  3. Application of Prevena Incision Management  Procedure Date:2025  POD:4 Days Post-Op    Subjective   Up in the chair. On room air. Weight down 3 pounds in the last day. She is 9 pounds above baseline weight. Walking 200 feet with 1 standing rest yesterday afternoon. Reports increased sternal pain today.  Nursing staff reports she needs reminders to follow sternal restrictions.  Productive cough earlier.  Reaching 1500 mL on incentive spirometer. She needs to practice stairs with PT.     IV drips: None  Telemetry: Sinus bradycardia 50s to sinus rhythm in the 80s       Objective     Visit Vitals  /58 (BP Location: Left arm, Patient Position: Lying)   Pulse 62   Temp 98 °F (36.7 °C) (Oral)   Resp 16   Ht 163.4 cm (64.33\")   Wt 86.4 kg (190 lb 6.4 oz) Comment: with shoes   SpO2 92%   BMI 32.35 kg/m²   Baseline weight: 181 pounds    Intake/Output Summary (Last 24 hours) at 2025 0921  Last data filed at 2025 0658  Gross per 24 hour   Intake 440 ml   Output 2050 ml   Net -1610 ml     Lab:     CBC:  Results from last 7 days   Lab Units 01/17/25  0313 01/16/25  0253 01/15/25  0424   WBC 10*3/mm3 9.00 13.66* 13.18*   HEMATOCRIT % 26.0* 27.9* 25.0*   PLATELETS 10*3/mm3 130* 131* 111*          BMP:  Results from last 7 days   Lab Units 01/17/25  0313 01/16/25  0253 01/15/25  0424   SODIUM mmol/L 135* 133* 132*   POTASSIUM mmol/L 3.9 3.8 4.6   CHLORIDE mmol/L 98 100 103   CO2 mmol/L 26.0 23.0 20.0*   GLUCOSE mg/dL 162* 125* 237*   BUN mg/dL 14 15 18   CREATININE mg/dL 0.70 0.82 0.89          COAG:  Results from last 7 days   Lab Units " 01/14/25  0145 01/13/25  1338   INR  1.17* 1.27*   APTT seconds  --  30.6       IMAGES:       Imaging Results (Last 24 Hours)       ** No results found for the last 24 hours. **          Physical Exam:  General: Alert, oriented. No apparent distress. Up in the chair.   Cardiovascular: Regular rate and rhythm without murmur, rubs, or gallops.    Pulmonary: Right sided breath sounds coarse, rhonchi. Left side breath sounds clear.   Chest: Sternotomy incision clean, dry, and intact with prevena. Sternum stable. No clicks.    Abdomen: Soft, non distended, and non tender.  Extremities: Warm, moves all extremities. Saphenectomy site clean, dry, and intact.  Peripheral edema present.   Neurologic:  Grossly intact with no focal deficits.            Impression:  Coronary artery disease  Stable angina pectoris  Type 2 diabetes mellitus with history of poorly controlled diabetes  Class I obesity  Chronic tobacco use  GERD        Plan:  CXR today  Continue diuresis  Encourage pulmonary toilet and ambulation  Routine postcardiac surgery regimen  Discussed with patient and nursing  Anticipate DC home later today vs tomorrow      OZIEL Odom  01/17/25  09:21 CST

## 2025-01-17 NOTE — PLAN OF CARE
Goal Outcome Evaluation:    Problem: Adult Inpatient Plan of Care  Goal: Plan of Care Review  Outcome: Not Progressing  Flowsheets (Taken 1/17/2025 3211)  Progress: no change  Outcome Evaluation: Pain managed with PRN medication - relief obtained. SB/S 53-80 per tele. Patient needing reminding to use sternal pillow and of fall precautions. Plan of care ongoing.   Plan of Care Reviewed With: patient

## 2025-01-18 ENCOUNTER — APPOINTMENT (OUTPATIENT)
Dept: GENERAL RADIOLOGY | Facility: HOSPITAL | Age: 65
DRG: 236 | End: 2025-01-18
Payer: COMMERCIAL

## 2025-01-18 ENCOUNTER — READMISSION MANAGEMENT (OUTPATIENT)
Dept: CALL CENTER | Facility: HOSPITAL | Age: 65
End: 2025-01-18
Payer: COMMERCIAL

## 2025-01-18 VITALS
HEART RATE: 70 BPM | HEIGHT: 64 IN | OXYGEN SATURATION: 91 % | RESPIRATION RATE: 18 BRPM | DIASTOLIC BLOOD PRESSURE: 60 MMHG | TEMPERATURE: 98.5 F | SYSTOLIC BLOOD PRESSURE: 141 MMHG | WEIGHT: 189.8 LBS | BODY MASS INDEX: 32.4 KG/M2

## 2025-01-18 LAB
ANION GAP SERPL CALCULATED.3IONS-SCNC: 10 MMOL/L (ref 5–15)
BUN SERPL-MCNC: 14 MG/DL (ref 8–23)
BUN/CREAT SERPL: 18.4 (ref 7–25)
CALCIUM SPEC-SCNC: 8.9 MG/DL (ref 8.6–10.5)
CHLORIDE SERPL-SCNC: 100 MMOL/L (ref 98–107)
CO2 SERPL-SCNC: 26 MMOL/L (ref 22–29)
CREAT SERPL-MCNC: 0.76 MG/DL (ref 0.57–1)
DEPRECATED RDW RBC AUTO: 43.6 FL (ref 37–54)
EGFRCR SERPLBLD CKD-EPI 2021: 87.6 ML/MIN/1.73
ERYTHROCYTE [DISTWIDTH] IN BLOOD BY AUTOMATED COUNT: 13.2 % (ref 12.3–15.4)
GLUCOSE BLDC GLUCOMTR-MCNC: 244 MG/DL (ref 70–130)
GLUCOSE SERPL-MCNC: 175 MG/DL (ref 65–99)
HCT VFR BLD AUTO: 26.3 % (ref 34–46.6)
HGB BLD-MCNC: 9 G/DL (ref 12–15.9)
MCH RBC QN AUTO: 30.9 PG (ref 26.6–33)
MCHC RBC AUTO-ENTMCNC: 34.2 G/DL (ref 31.5–35.7)
MCV RBC AUTO: 90.4 FL (ref 79–97)
PLATELET # BLD AUTO: 141 10*3/MM3 (ref 140–450)
PMV BLD AUTO: 11.1 FL (ref 6–12)
POTASSIUM SERPL-SCNC: 3.9 MMOL/L (ref 3.5–5.2)
RBC # BLD AUTO: 2.91 10*6/MM3 (ref 3.77–5.28)
SODIUM SERPL-SCNC: 136 MMOL/L (ref 136–145)
WBC NRBC COR # BLD AUTO: 7.47 10*3/MM3 (ref 3.4–10.8)

## 2025-01-18 PROCEDURE — 82948 REAGENT STRIP/BLOOD GLUCOSE: CPT

## 2025-01-18 PROCEDURE — 25010000002 ENOXAPARIN PER 10 MG: Performed by: THORACIC SURGERY (CARDIOTHORACIC VASCULAR SURGERY)

## 2025-01-18 PROCEDURE — 99232 SBSQ HOSP IP/OBS MODERATE 35: CPT | Performed by: SURGERY

## 2025-01-18 PROCEDURE — 63710000001 INSULIN LISPRO (HUMAN) PER 5 UNITS: Performed by: THORACIC SURGERY (CARDIOTHORACIC VASCULAR SURGERY)

## 2025-01-18 PROCEDURE — 85027 COMPLETE CBC AUTOMATED: CPT | Performed by: NURSE PRACTITIONER

## 2025-01-18 PROCEDURE — 25010000002 FUROSEMIDE PER 20 MG: Performed by: NURSE PRACTITIONER

## 2025-01-18 PROCEDURE — 71046 X-RAY EXAM CHEST 2 VIEWS: CPT

## 2025-01-18 PROCEDURE — 97116 GAIT TRAINING THERAPY: CPT

## 2025-01-18 PROCEDURE — 80048 BASIC METABOLIC PNL TOTAL CA: CPT | Performed by: NURSE PRACTITIONER

## 2025-01-18 PROCEDURE — 63710000001 INSULIN GLARGINE PER 5 UNITS: Performed by: NURSE PRACTITIONER

## 2025-01-18 RX ORDER — LISINOPRIL 5 MG/1
10 TABLET ORAL
Qty: 30 TABLET | Refills: 5 | Status: SHIPPED | OUTPATIENT
Start: 2025-01-19

## 2025-01-18 RX ORDER — METOPROLOL TARTRATE 25 MG/1
12.5 TABLET, FILM COATED ORAL EVERY 12 HOURS SCHEDULED
Qty: 30 TABLET | Refills: 5 | Status: SHIPPED | OUTPATIENT
Start: 2025-01-18

## 2025-01-18 RX ORDER — PANTOPRAZOLE SODIUM 40 MG/1
40 TABLET, DELAYED RELEASE ORAL
Qty: 30 TABLET | Refills: 0 | Status: SHIPPED | OUTPATIENT
Start: 2025-01-18

## 2025-01-18 RX ORDER — TRAMADOL HYDROCHLORIDE 50 MG/1
25 TABLET ORAL EVERY 6 HOURS PRN
Qty: 25 TABLET | Refills: 0 | Status: SHIPPED | OUTPATIENT
Start: 2025-01-18

## 2025-01-18 RX ORDER — CLOPIDOGREL BISULFATE 75 MG/1
75 TABLET ORAL DAILY
Qty: 30 TABLET | Refills: 2 | Status: SHIPPED | OUTPATIENT
Start: 2025-01-18

## 2025-01-18 RX ORDER — ATORVASTATIN CALCIUM 40 MG/1
40 TABLET, FILM COATED ORAL NIGHTLY
Qty: 30 TABLET | Refills: 2 | Status: SHIPPED | OUTPATIENT
Start: 2025-01-18

## 2025-01-18 RX ORDER — FUROSEMIDE 20 MG/1
20 TABLET ORAL DAILY
Qty: 5 TABLET | Refills: 0 | Status: SHIPPED | OUTPATIENT
Start: 2025-01-18 | End: 2025-01-23

## 2025-01-18 RX ADMIN — CITALOPRAM HYDROBROMIDE 10 MG: 20 TABLET ORAL at 09:08

## 2025-01-18 RX ADMIN — ENOXAPARIN SODIUM 40 MG: 100 INJECTION SUBCUTANEOUS at 09:07

## 2025-01-18 RX ADMIN — INSULIN GLARGINE 10 UNITS: 100 INJECTION, SOLUTION SUBCUTANEOUS at 09:06

## 2025-01-18 RX ADMIN — METFORMIN HYDROCHLORIDE 500 MG: 500 TABLET ORAL at 09:07

## 2025-01-18 RX ADMIN — FUROSEMIDE 20 MG: 10 INJECTION, SOLUTION INTRAMUSCULAR; INTRAVENOUS at 09:07

## 2025-01-18 RX ADMIN — PANTOPRAZOLE SODIUM 40 MG: 40 TABLET, DELAYED RELEASE ORAL at 06:01

## 2025-01-18 RX ADMIN — ASPIRIN 81 MG: 81 TABLET, COATED ORAL at 09:07

## 2025-01-18 RX ADMIN — ACETAMINOPHEN 1000 MG: 500 TABLET, FILM COATED ORAL at 06:01

## 2025-01-18 RX ADMIN — POTASSIUM CHLORIDE 20 MEQ: 750 CAPSULE, EXTENDED RELEASE ORAL at 09:08

## 2025-01-18 RX ADMIN — PREGABALIN 25 MG: 25 CAPSULE ORAL at 09:08

## 2025-01-18 RX ADMIN — AMIODARONE HYDROCHLORIDE 200 MG: 200 TABLET ORAL at 09:07

## 2025-01-18 RX ADMIN — METOPROLOL TARTRATE 12.5 MG: 25 TABLET, FILM COATED ORAL at 09:07

## 2025-01-18 RX ADMIN — ACETAMINOPHEN 1000 MG: 500 TABLET, FILM COATED ORAL at 00:19

## 2025-01-18 RX ADMIN — LIDOCAINE 2 PATCH: 0.04 PATCH TOPICAL at 09:08

## 2025-01-18 RX ADMIN — LISINOPRIL 5 MG: 5 TABLET ORAL at 09:06

## 2025-01-18 RX ADMIN — INSULIN LISPRO 4 UNITS: 100 INJECTION, SOLUTION INTRAVENOUS; SUBCUTANEOUS at 09:07

## 2025-01-18 NOTE — DISCHARGE INSTRUCTIONS
Someone from the [facility] Call Center will be contacting you after discharge to check on your recovery. Pleas answer any calls from a [facility] phone number.

## 2025-01-18 NOTE — PROGRESS NOTES
"    [facility] Cardiothoracic Surgery  PROGRESS NOTE   Procedure:  01/13/2025  Coronary artery bypass grafting-3 vessel (left internal mammary artery/left anterior descending, reverse saphenous vein graft/obtuse marginal, and reverse saphenous vein graft/posterior descending artery)  2.   Right endoscopic vein harvest  3.   Application of Prevena Incision Management    Subjective:  Resting in bed. Stated she had a good night. Pain well-controlled. Ambulating with physical therapy. Using incentive spirometry.       Objective:      /60 (BP Location: Left arm, Patient Position: Lying)   Pulse 70   Temp 98.5 °F (36.9 °C) (Oral)   Resp 18   Ht 163.4 cm (64.33\")   Wt 86.1 kg (189 lb 12.8 oz)   SpO2 91%   BMI 32.24 kg/m²       Intake/Output Summary (Last 24 hours) at 1/18/2025 1023  Last data filed at 1/17/2025 1702  Gross per 24 hour   Intake --   Output 500 ml   Net -500 ml         PE:  Vitals:    01/18/25 0759   BP: 141/60   Pulse: 70   Resp: 18   Temp: 98.5 °F (36.9 °C)   SpO2: 91%     GENERAL: NAD, resting comfortably, normal color  CARDIOVASCULAR: Normal HT with RRR. No murmurs, gallops or rubs.   PULMONARY: Fairly clear bilateral breath sounds with scattered rhonchi that clears with cough. No wheezes or rales.   CHEST: Sternotomy incision with Prevena intact with good seal. Sternum appears stable. No clicks.  ABDOMEN: Soft, non-tender/non-distended with active bowel sounds.  EXTREMITIES: No clubbing or cyanosis. Trace of BLE edema. Right saphenectomy incision - open to air with steri-strips intact. Skin edges well approximated. No drainage or erythema.         Lab Results (last 72 hours)       Procedure Component Value Units Date/Time    POC Glucose Once [[id number]]  (Abnormal) Collected: 01/18/25 0758    Specimen: Blood Updated: 01/18/25 0828     Glucose 244 mg/dL      Comment: : [id number] Robbin EuraisaMeter ID: [id number]       Basic Metabolic Panel [[id number]]  (Abnormal) " Collected: 01/18/25 0338    Specimen: Blood Updated: 01/18/25 0419     Glucose 175 mg/dL      BUN 14 mg/dL      Creatinine 0.76 mg/dL      Sodium 136 mmol/L      Potassium 3.9 mmol/L      Chloride 100 mmol/L      CO2 26.0 mmol/L      Calcium 8.9 mg/dL      BUN/Creatinine Ratio 18.4     Anion Gap 10.0 mmol/L      eGFR 87.6 mL/min/1.73     Narrative:      GFR Categories in Chronic Kidney Disease (CKD)      GFR Category          GFR (mL/min/1.73)    Interpretation  G1                     90 or greater         Normal or high (1)  G2                      60-89                Mild decrease (1)  G3a                   45-59                Mild to moderate decrease  G3b                   30-44                Moderate to severe decrease  G4                    15-29                Severe decrease  G5                    14 or less           Kidney failure          (1)In the absence of evidence of kidney disease, neither GFR category G1 or G2 fulfill the criteria for CKD.    eGFR calculation 2021 CKD-EPI creatinine equation, which does not include race as a factor    CBC (No Diff) [[id number]]  (Abnormal) Collected: 01/18/25 0338    Specimen: Blood Updated: 01/18/25 0357     WBC 7.47 10*3/mm3      RBC 2.91 10*6/mm3      Hemoglobin 9.0 g/dL      Hematocrit 26.3 %      MCV 90.4 fL      MCH 30.9 pg      MCHC 34.2 g/dL      RDW 13.2 %      RDW-SD 43.6 fl      MPV 11.1 fL      Platelets 141 10*3/mm3     POC Glucose Once [[id number]]  (Abnormal) Collected: 01/17/25 2015    Specimen: Blood Updated: 01/17/25 2026     Glucose 384 mg/dL      Comment: : [id number] Herson AlexanderMeter ID: [id number]       POC Glucose Once [[id number]]  (Abnormal) Collected: 01/17/25 1644    Specimen: Blood Updated: 01/17/25 1656     Glucose 315 mg/dL      Comment: : [id number] Becky Chou ([place]) ID: [id number]       CBC (No Diff) [[id number]]  (Abnormal) Collected: 01/17/25 1048    Specimen: Blood Updated: 01/17/25 1129     WBC 8.73 10*3/mm3       RBC 2.94 10*6/mm3      Hemoglobin 9.1 g/dL      Hematocrit 27.1 %      MCV 92.2 fL      MCH 31.0 pg      MCHC 33.6 g/dL      RDW 13.3 %      RDW-SD 44.6 fl      MPV 11.7 fL      Platelets 145 10*3/mm3     POC Glucose Once [[id number]]  (Abnormal) Collected: 01/17/25 1110    Specimen: Blood Updated: 01/17/25 1122     Glucose 309 mg/dL      Comment: : [id number] Samano BrendaMeter ID: [id number]       POC Glucose Once [[id number]]  (Abnormal) Collected: 01/17/25 0723    Specimen: Blood Updated: 01/17/25 0734     Glucose 231 mg/dL      Comment: : [id number] GarageSkins BrendaMeter ID: [id number]       Basic Metabolic Panel [[id number]]  (Abnormal) Collected: 01/17/25 0313    Specimen: Blood Updated: 01/17/25 0418     Glucose 162 mg/dL      BUN 14 mg/dL      Creatinine 0.70 mg/dL      Sodium 135 mmol/L      Potassium 3.9 mmol/L      Chloride 98 mmol/L      CO2 26.0 mmol/L      Calcium 8.9 mg/dL      BUN/Creatinine Ratio 20.0     Anion Gap 11.0 mmol/L      eGFR 96.7 mL/min/1.73     Narrative:      GFR Categories in Chronic Kidney Disease (CKD)      GFR Category          GFR (mL/min/1.73)    Interpretation  G1                     90 or greater         Normal or high (1)  G2                      60-89                Mild decrease (1)  G3a                   45-59                Mild to moderate decrease  G3b                   30-44                Moderate to severe decrease  G4                    15-29                Severe decrease  G5                    14 or less           Kidney failure          (1)In the absence of evidence of kidney disease, neither GFR category G1 or G2 fulfill the criteria for CKD.    eGFR calculation 2021 CKD-EPI creatinine equation, which does not include race as a factor    CBC (No Diff) [[id number]]  (Abnormal) Collected: 01/17/25 0313    Specimen: Blood Updated: 01/17/25 0403     WBC 9.00 10*3/mm3      RBC 2.84 10*6/mm3      Hemoglobin 8.8 g/dL      Hematocrit 26.0 %      MCV 91.5 fL      MCH  31.0 pg      MCHC 33.8 g/dL      RDW 13.2 %      RDW-SD 44.0 fl      MPV 11.9 fL      Platelets 130 10*3/mm3     POC Glucose Once [[id number]]  (Abnormal) Collected: 01/16/25 1958    Specimen: Blood Updated: 01/16/25 2009     Glucose 226 mg/dL      Comment: : [id number] Bains RileyMeter ID: [id number]       POC Glucose Once [[id number]]  (Abnormal) Collected: 01/16/25 1654    Specimen: Blood Updated: 01/16/25 1705     Glucose 262 mg/dL      Comment: : [id number] O'Marana IvyMeter ID: [id number]       POC Glucose Once [[id number]]  (Abnormal) Collected: 01/16/25 1137    Specimen: Blood Updated: 01/16/25 1156     Glucose 270 mg/dL      Comment: : [id number] O'Mayela IvyMeter ID: [id number]       POC Glucose Once [[id number]]  (Abnormal) Collected: 01/16/25 0755    Specimen: Blood Updated: 01/16/25 0823     Glucose 161 mg/dL      Comment: : [id number] O'Mayela IvyMeter ID: [id number]       Comprehensive Metabolic Panel [[id number]]  (Abnormal) Collected: 01/16/25 0253    Specimen: Blood Updated: 01/16/25 0325     Glucose 125 mg/dL      BUN 15 mg/dL      Creatinine 0.82 mg/dL      Sodium 133 mmol/L      Potassium 3.8 mmol/L      Chloride 100 mmol/L      CO2 23.0 mmol/L      Calcium 8.8 mg/dL      Total Protein 5.8 g/dL      Albumin 3.6 g/dL      ALT (SGPT) 12 U/L      AST (SGOT) 19 U/L      Alkaline Phosphatase 53 U/L      Total Bilirubin 0.4 mg/dL      Globulin 2.2 gm/dL      A/G Ratio 1.6 g/dL      BUN/Creatinine Ratio 18.3     Anion Gap 10.0 mmol/L      eGFR 80.0 mL/min/1.73     Narrative:      GFR Categories in Chronic Kidney Disease (CKD)      GFR Category          GFR (mL/min/1.73)    Interpretation  G1                     90 or greater         Normal or high (1)  G2                      60-89                Mild decrease (1)  G3a                   45-59                Mild to moderate decrease  G3b                   30-44                Moderate to severe decrease  G4                    15-29                 Severe decrease  G5                    14 or less           Kidney failure          (1)In the absence of evidence of kidney disease, neither GFR category G1 or G2 fulfill the criteria for CKD.    eGFR calculation 2021 CKD-EPI creatinine equation, which does not include race as a factor    CBC & Differential [[id number]]  (Abnormal) Collected: 01/16/25 0253    Specimen: Blood Updated: 01/16/25 0317    Narrative:      The following orders were created for panel order CBC & Differential.  Procedure                               Abnormality         Status                     ---------                               -----------         ------                     CBC Auto Differential[[id number]]        Abnormal            Final result                 Please view results for these tests on the individual orders.    CBC Auto Differential [[id number]]  (Abnormal) Collected: 01/16/25 0253    Specimen: Blood Updated: 01/16/25 0317     WBC 13.66 10*3/mm3      RBC 3.00 10*6/mm3      Hemoglobin 9.1 g/dL      Hematocrit 27.9 %      MCV 93.0 fL      MCH 30.3 pg      MCHC 32.6 g/dL      RDW 13.7 %      RDW-SD 46.6 fl      MPV 11.3 fL      Platelets 131 10*3/mm3      Neutrophil % 76.8 %      Lymphocyte % 14.3 %      Monocyte % 5.4 %      Eosinophil % 2.1 %      Basophil % 0.2 %      Immature Grans % 1.2 %      Neutrophils, Absolute 10.49 10*3/mm3      Lymphocytes, Absolute 1.95 10*3/mm3      Monocytes, Absolute 0.74 10*3/mm3      Eosinophils, Absolute 0.29 10*3/mm3      Basophils, Absolute 0.03 10*3/mm3      Immature Grans, Absolute 0.16 10*3/mm3     POC Glucose Once [[id number]]  (Abnormal) Collected: 01/15/25 1943    Specimen: Blood Updated: 01/15/25 1954     Glucose 226 mg/dL      Comment: : [id number] Choat MariahMeter ID: [id number]       POC Glucose Once [[id number]]  (Abnormal) Collected: 01/15/25 1703    Specimen: Blood Updated: 01/15/25 1714     Glucose 218 mg/dL      Comment: : [id number] Milton LisaMeter ID:  [id number]       POC Glucose Once [[id number]]  (Abnormal) Collected: 01/15/25 1109    Specimen: Blood Updated: 01/15/25 1120     Glucose 285 mg/dL      Comment: : [id number] ThompsonMeter ID: [id number]                 CXR: Normal postoperative changes.    Assessment & Plan   CAD - S/P 3V CABG (left internal mammary artery/left anterior descending, reverse saphenous vein graft/obtuse marginal, and reverse saphenous vein graft/posterior descending artery) on 01/13/2025. On ASA 81 mg daily, Plavix 75 mg daily, metoprolol 12.5 mg BID, lisinopril 5 mg daily, and atorvastatin 20 mg nightly.   Essential HTN - controlled with BB and ACE-I  Mixed Hyperlipidemia - on statin.   DM, Type 2 - Controlled, HgbA1c 7.30 on 01/10/2025. On Alogliptin-metformin 12.5-500 mg BID and long-acting insulin. Encouraged patient to follow diabetic diet at home.         Patient is doing well from surgical standpoint. Will discharge home with plan to return to the office on 01/20/2025 for removal of Prevena. Discharge instructions given and discussed with patient.       OZIEL Curiel

## 2025-01-18 NOTE — THERAPY TREATMENT NOTE
Acute Care - Physical Therapy Treatment Note  [facility]     Patient Name: Nemo Lazaro  : 1960  MRN: [id number]  Today's Date: 2025      Visit Dx:     ICD-10-CM ICD-9-CM   1. Impaired mobility [Z74.09]  Z74.09 799.89   2. Coronary artery disease of native artery of native heart with stable angina pectoris  I25.118 414.01     413.9     Patient Active Problem List   Diagnosis    Precordial chest pain    Coronary artery disease of native artery of native heart with stable angina pectoris    CAD (coronary artery disease)    HTN (hypertension)    Mixed hyperlipidemia    Type 2 diabetes mellitus    Former smoker    LORENA (obstructive sleep apnea)     Past Medical History:   Diagnosis Date    Anxiety     Arthritis     HIPS, KNEES    Bowel incontinence     pt states sometimes    Cataracts, bilateral     Coronary artery disease     Depression     Diabetes mellitus     type 2    Diverticulosis     GERD (gastroesophageal reflux disease)     History of transfusion     Hyperlipidemia     Hypertension     Kidney stones     Shortness of breath     Sleep apnea     PT USES A CPAP MACHINE    Urinary frequency     AND URGENCY     Past Surgical History:   Procedure Laterality Date    APPENDECTOMY      CARDIAC CATHETERIZATION N/A 10/23/2020    Procedure: Cardiac Catheterization/Vascular Study;  Surgeon: Devante Wang MD;  Location: [facility] CATH INVASIVE LOCATION;  Service: Cardiology;  Laterality: N/A;    CARDIAC CATHETERIZATION Left 10/23/2024    Procedure: Cardiac Catheterization/Vascular Study;  Surgeon: Devante Wang MD;  Location:  [facility] CATH INVASIVE LOCATION;  Service: Cardiology;  Laterality: Left;     SECTION      x2    CHOLECYSTECTOMY      CORONARY ARTERY BYPASS GRAFT N/A 2025    Procedure: CORONARY ARTERY BYPASS GRAFTING X3, LEFT INTERNAL MAMMARY ARTERY GRAFT, RIGHT LEG ENDOSCOPIC VEIN HARVEST, TRANSESOPHAGEAL ECHOCARDIOGRAM, APPLICATION OF PREVENA;  Surgeon: Lam Melgar MD;  Location:   [facility] OR;  Service: Cardiothoracic;  Laterality: N/A;    HYSTERECTOMY       PT Assessment (Last 12 Hours)       PT Evaluation and Treatment       Row Name 01/18/25 1120 01/18/25 1014       Physical Therapy Time and Intention    Subjective Information complains of;fatigue  -WK --    Document Type therapy note (daily note)  -WK --    Mode of Treatment physical therapy  -WK --    Session Not Performed -- other (see comments)  -WK    Comment -- wires pulled will check back  -WK      Row Name 01/18/25 1120          General Information    Existing Precautions/Restrictions fall;sternal  -WK       Row Name 01/18/25 1120          Pain    Pretreatment Pain Rating 0/10 - no pain  -WK     Posttreatment Pain Rating 0/10 - no pain  -WK       Row Name 01/18/25 1120          Bed Mobility    Sidelying-Sit Kinder (Bed Mobility) verbal cues;moderate assist (50% patient effort)  -WK       Row Name 01/18/25 1120          Sit-Stand Transfer    Sit-Stand Kinder (Transfers) supervision  -WK       Row Name 01/18/25 1120          Stand-Sit Transfer    Stand-Sit Kinder (Transfers) supervision  -WK       Row Name 01/18/25 1120          Gait/Stairs (Locomotion)    Kinder Level (Gait) standby assist  -WK     Distance in Feet (Gait) 200  -WK     Deviations/Abnormal Patterns (Gait) gait speed decreased  -WK     Bilateral Gait Deviations forward flexed posture  -WK     Kinder Level (Stairs) stand by assist;verbal cues  -WK     Handrail Location (Stairs) right side (ascending);right side (descending)  -WK     Number of Steps (Stairs) 9  -WK     Ascending Technique (Stairs) step-over-step  -WK     Descending Technique (Stairs) step-over-step  -WK     Comment, (Gait/Stairs) assisted pt with clean up after incontinent  -WK       Row Name             Wound 01/13/25 0819 Right medial leg    Wound - Properties Group Placement Date: 01/13/25  -TJ Placement Time: 0819  -TJ Side: Right  -TJ Orientation: medial  -TJ Location:  leg  -TJ Primary Wound Type: Incision  -TJ    Retired Wound - Properties Group Placement Date: 01/13/25  -TJ Placement Time: 0819  -TJ Side: Right  -TJ Orientation: medial  -TJ Location: leg  -TJ Primary Wound Type: Incision  -TJ    Retired Wound - Properties Group Placement Date: 01/13/25  -TJ Placement Time: 0819  -TJ Side: Right  -TJ Orientation: medial  -TJ Location: leg  -TJ Primary Wound Type: Incision  -TJ    Retired Wound - Properties Group Date first assessed: 01/13/25  -TJ Time first assessed: 0819  -TJ Side: Right  -TJ Location: leg  -TJ Primary Wound Type: Incision  -TJ      Row Name             Wound 01/13/25 0819 Right groin    Wound - Properties Group Placement Date: 01/13/25  -TJ Placement Time: 0819  -TJ Side: Right  -TJ Location: groin  -TJ Primary Wound Type: Puncture  -TJ    Retired Wound - Properties Group Placement Date: 01/13/25  -TJ Placement Time: 0819  -TJ Side: Right  -TJ Location: groin  -TJ Primary Wound Type: Puncture  -TJ    Retired Wound - Properties Group Placement Date: 01/13/25  -TJ Placement Time: 0819  -TJ Side: Right  -TJ Location: groin  -TJ Primary Wound Type: Puncture  -TJ    Retired Wound - Properties Group Date first assessed: 01/13/25  -TJ Time first assessed: 0819  -TJ Side: Right  -TJ Location: groin  -TJ Primary Wound Type: Puncture  -TJ      Row Name             Wound 01/13/25 0858 midline sternal    Wound - Properties Group Placement Date: 01/13/25  -TJ Placement Time: 0858  -TJ Orientation: midline  -TJ Location: sternal  -TJ Primary Wound Type: Incision  -TJ    Retired Wound - Properties Group Placement Date: 01/13/25  -TJ Placement Time: 0858  -TJ Orientation: midline  -TJ Location: sternal  -TJ Primary Wound Type: Incision  -TJ    Retired Wound - Properties Group Placement Date: 01/13/25  -TJ Placement Time: 0858  -TJ Orientation: midline  -TJ Location: sternal  -TJ Primary Wound Type: Incision  -TJ    Retired Wound - Properties Group Date first assessed: 01/13/25   -TJ Time first assessed: 0858  -TJ Location: sternal  -TJ Primary Wound Type: Incision  -TJ      Row Name             Wound 01/13/25 0819 Right lower leg    Wound - Properties Group Placement Date: 01/13/25  -TJ Placement Time: 0819  -TJ Side: Right  -TJ Orientation: lower  -TJ Location: leg  -TJ Primary Wound Type: Puncture  -TJ    Retired Wound - Properties Group Placement Date: 01/13/25  -TJ Placement Time: 0819  -TJ Side: Right  -TJ Orientation: lower  -TJ Location: leg  -TJ Primary Wound Type: Puncture  -TJ    Retired Wound - Properties Group Placement Date: 01/13/25  -TJ Placement Time: 0819  -TJ Side: Right  -TJ Orientation: lower  -TJ Location: leg  -TJ Primary Wound Type: Puncture  -TJ    Retired Wound - Properties Group Date first assessed: 01/13/25  -TJ Time first assessed: 0819  -TJ Side: Right  -TJ Location: leg  -TJ Primary Wound Type: Puncture  -TJ      Row Name             NPWT (Negative Pressure Wound Therapy) 01/13/25 1250 sternum    NPWT (Negative Pressure Wound Therapy) - Properties Group Placement Date: 01/13/25  -TJ Placement Time: 1250  -TJ Location: sternum  -TJ Additional Comments: prevena  -TJ    Retired NPWT (Negative Pressure Wound Therapy) - Properties Group Placement Date: 01/13/25  -TJ Placement Time: 1250  -TJ Location: sternum  -TJ Additional Comments: prevena  -TJ    Retired NPWT (Negative Pressure Wound Therapy) - Properties Group Placement Date: 01/13/25  -TJ Placement Time: 1250  -TJ Location: sternum  -TJ Additional Comments: prevena  -TJ    Retired NPWT (Negative Pressure Wound Therapy) - Properties Group Placement Date: 01/13/25  -TJ Placement Time: 1250  -TJ Location: sternum  -TJ Additional Comments: prevena  -TJ      Row Name 01/18/25 1120          Plan of Care Review    Plan of Care Reviewed With patient  -WK     Progress improving  -WK     Outcome Evaluation Bed mobility mod A. sit to stand SBA. Amb 200' and performed 9 steps SBA. Reviewed sternal precautions and home  safety.  -WK       Row Name 01/18/25 1120          Positioning and Restraints    Pre-Treatment Position in bed  -WK     Post Treatment Position chair  -WK     In Chair reclined;call light within reach;encouraged to call for assist;with family/caregiver  -WK               User Key  (r) = Recorded By, (t) = Taken By, (c) = Cosigned By      Initials Name Provider Type    WK Michelle Pierre PTA Physical Therapist Assistant    Valerie Adam RN Registered Nurse                    Physical Therapy Education       Title: PT OT SLP Therapies (In Progress)       Topic: Physical Therapy (In Progress)       Point: Mobility training (Done)       Learning Progress Summary            Patient Acceptance, E, VU,NR by RAF at 1/14/2025 0945    Comment: Benefits of activity, progression of PT, sternal prec                      Point: Home exercise program (Not Started)       Learner Progress:  Not documented in this visit.              Point: Body mechanics (In Progress)       Learning Progress Summary            Patient Acceptance, E,D, NR by KELSEY at 1/15/2025 1027    Comment: purse lip breathing                      Point: Precautions (In Progress)       Learning Progress Summary            Patient Acceptance, E,D, NR by KELSEY at 1/17/2025 1154    Comment: sternal restrictions    Acceptance, E,D, NR by KELSEY at 1/16/2025 1143    Comment: sternal    Acceptance, E, VU,NR by RAF at 1/14/2025 0945    Comment: Benefits of activity, progression of PT, sternal prec                                      User Key       Initials Effective Dates Name Provider Type Discipline    RAF 02/03/23 -  Joaquín Shaw PT DPT Physical Therapist PT    KELSEY 02/03/23 -  Anne Patino PTA Physical Therapist Assistant PT                  PT Recommendation and Plan     Plan of Care Reviewed With: patient  Progress: improving  Outcome Evaluation: Bed mobility mod A. sit to stand SBA. Amb 200' and performed 9 steps SBA. Reviewed sternal precautions and home  safety.   Outcome Measures       Row Name 01/17/25 1100 01/16/25 1100          How much help from another person do you currently need...    Turning from your back to your side while in flat bed without using bedrails? 4  -KELSEY 3  -KELSEY     Moving from lying on back to sitting on the side of a flat bed without bedrails? 3  -KELSEY 4  -KELSEY     Moving to and from a bed to a chair (including a wheelchair)? 4  -KELSEY 4  -KELSEY     Standing up from a chair using your arms (e.g., wheelchair, bedside chair)? 4  -KELSEY 4  no arms  -KELSEY     Climbing 3-5 steps with a railing? 3  -KELSEY 3  -KELSEY     To walk in hospital room? 3  -KELSEY 3  -KELSEY     AM-PAC 6 Clicks Score (PT) 21  -KELSEY 21  -KELSEY               User Key  (r) = Recorded By, (t) = Taken By, (c) = Cosigned By      Initials Name Provider Type    KELSEY Anne Patino PTA Physical Therapist Assistant                     Time Calculation:    PT Charges       Row Name 01/18/25 1149             Time Calculation    Start Time 1120  -WK      Stop Time 1144  -WK      Time Calculation (min) 24 min  -WK      PT Received On 01/18/25  -WK         Time Calculation- PT    Total Timed Code Minutes- PT 24 minute(s)  -WK         Timed Charges    26608 - Gait Training Minutes  24  -WK         Total Minutes    Timed Charges Total Minutes 24  -WK       Total Minutes 24  -WK                User Key  (r) = Recorded By, (t) = Taken By, (c) = Cosigned By      Initials Name Provider Type    WK Michelle Pierre PTA Physical Therapist Assistant                  Therapy Charges for Today       Code Description Service Date Service Provider Modifiers Qty    40002460208 HC GAIT TRAINING EA 15 MIN 1/18/2025 Michelle Pierre PTA GP 2            PT G-Codes  Outcome Measure Options: AM-PAC 6 Clicks Basic Mobility (PT)  AM-PAC 6 Clicks Score (PT): 21    Michelle Pierre PTA  1/18/2025

## 2025-01-18 NOTE — PLAN OF CARE
Problem: Adult Inpatient Plan of Care  Goal: Plan of Care Review  Outcome: Progressing  Flowsheets (Taken 1/18/2025 0609)  Progress: improving  Plan of Care Reviewed With: patient  Goal: Patient-Specific Goal (Individualized)  Outcome: Progressing  Goal: Absence of Hospital-Acquired Illness or Injury  Outcome: Progressing  Intervention: Identify and Manage Fall Risk  Recent Flowsheet Documentation  Taken 1/18/2025 0600 by Madeline Vera RN  Safety Promotion/Fall Prevention: safety round/check completed  Taken 1/18/2025 0400 by Madeline Vera RN  Safety Promotion/Fall Prevention: safety round/check completed  Taken 1/18/2025 0200 by Madeline Vera RN  Safety Promotion/Fall Prevention: safety round/check completed  Taken 1/18/2025 0000 by Madeline Vera RN  Safety Promotion/Fall Prevention: safety round/check completed  Taken 1/17/2025 2200 by Madeline Vera RN  Safety Promotion/Fall Prevention: safety round/check completed  Taken 1/17/2025 2100 by Madeline Vera RN  Safety Promotion/Fall Prevention: safety round/check completed  Taken 1/17/2025 2000 by Madeline Vera RN  Safety Promotion/Fall Prevention:   safety round/check completed   toileting scheduled   room organization consistent   nonskid shoes/slippers when out of bed   fall prevention program maintained   clutter free environment maintained   assistive device/personal items within reach   activity supervised  Taken 1/17/2025 1900 by Madeline Vera RN  Safety Promotion/Fall Prevention: safety round/check completed  Intervention: Prevent Skin Injury  Recent Flowsheet Documentation  Taken 1/18/2025 0600 by Madeline Vera RN  Body Position: position changed independently  Taken 1/18/2025 0400 by Madeline Vera RN  Body Position: position changed independently  Taken 1/18/2025 0200 by Madeline Vera RN  Body Position: position changed independently  Taken 1/18/2025 0000 by Madeline Vera RN  Body Position: position changed independently  Taken 1/17/2025 2200 by Madeline Vera RN  Body  Position: position changed independently  Taken 1/17/2025 2100 by Madeline Vera RN  Body Position: position changed independently  Taken 1/17/2025 2000 by Madeline Vera RN  Body Position: position changed independently  Skin Protection:   incontinence pads utilized   protective footwear used  Taken 1/17/2025 1900 by Madeline Vera RN  Body Position: position changed independently  Intervention: Prevent and Manage VTE (Venous Thromboembolism) Risk  Recent Flowsheet Documentation  Taken 1/17/2025 2000 by Madeline Vera RN  VTE Prevention/Management: (see MAR) other (see comments)  Intervention: Prevent Infection  Recent Flowsheet Documentation  Taken 1/17/2025 2000 by Madeline Vera RN  Infection Prevention: hand hygiene promoted  Goal: Optimal Comfort and Wellbeing  Outcome: Progressing  Intervention: Provide Person-Centered Care  Recent Flowsheet Documentation  Taken 1/17/2025 2000 by Madeline Vera RN  Trust Relationship/Rapport:   care explained   choices provided   questions answered   questions encouraged   reassurance provided  Goal: Readiness for Transition of Care  Outcome: Progressing     Problem: Skin Injury Risk Increased  Goal: Skin Health and Integrity  Outcome: Progressing  Intervention: Optimize Skin Protection  Recent Flowsheet Documentation  Taken 1/18/2025 0600 by Madeline Vera RN  Head of Bed (HOB) Positioning: HOB elevated  Taken 1/18/2025 0400 by Madeline Vera RN  Activity Management: ambulated outside room  Head of Bed (HOB) Positioning: HOB elevated  Taken 1/18/2025 0200 by Madeline Vera RN  Head of Bed (HOB) Positioning: HOB elevated  Taken 1/18/2025 0000 by Madeline Vera RN  Head of Bed (HOB) Positioning: HOB elevated  Taken 1/17/2025 2200 by Madeline Vera RN  Head of Bed (HOB) Positioning: HOB elevated  Taken 1/17/2025 2100 by Madeline Vera RN  Head of Bed (HOB) Positioning: HOB elevated  Taken 1/17/2025 2000 by Madeline Vera RN  Pressure Reduction Techniques:   frequent weight shift encouraged   pressure  points protected   weight shift assistance provided  Head of Bed (HOB) Positioning: HOB elevated  Skin Protection:   incontinence pads utilized   protective footwear used  Taken 1/17/2025 1900 by Madeline Vera RN  Head of Bed ([facility]) Positioning: HOB elevated     Problem: Cardiovascular Surgery  Goal: Improved Activity Tolerance  Outcome: Progressing  Goal: Optimal Coping with Heart Surgery  Outcome: Progressing  Intervention: Support Psychosocial Response to Surgery  Recent Flowsheet Documentation  Taken 1/17/2025 2000 by Madeline Vera RN  Family/Support System Care:   support provided   self-care encouraged  Goal: Effective Bowel Elimination  Outcome: Progressing  Goal: Effective Cardiac Function  Outcome: Progressing  Goal: Optimal Cerebral Tissue Perfusion  Outcome: Progressing  Intervention: Protect and Optimize Cerebral Perfusion  Recent Flowsheet Documentation  Taken 1/18/2025 0600 by Madeline Vera RN  Head of Bed (HOB) Positioning: HOB elevated  Taken 1/18/2025 0400 by Madeline Vera RN  Head of Bed (HOB) Positioning: HOB elevated  Taken 1/18/2025 0200 by Madeline Vera RN  Head of Bed (HOB) Positioning: HOB elevated  Taken 1/18/2025 0000 by Madeline Vera RN  Head of Bed (HOB) Positioning: HOB elevated  Taken 1/17/2025 2200 by Madeline Vera RN  Head of Bed (HOB) Positioning: HOB elevated  Taken 1/17/2025 2100 by Madeline Vera RN  Head of Bed (HOB) Positioning: HOB elevated  Taken 1/17/2025 2000 by Madeline Vera RN  Head of Bed (HOB) Positioning: HOB elevated  Taken 1/17/2025 1900 by Madeline Vera RN  Head of Bed (HOB) Positioning: HOB elevated  Goal: Fluid and Electrolyte Balance  Outcome: Progressing  Goal: Blood Glucose Level Within Target Range  Outcome: Progressing  Goal: Absence of Infection Signs and Symptoms  Outcome: Progressing  Intervention: Prevent or Manage Infection  Recent Flowsheet Documentation  Taken 1/17/2025 2000 by Madeline Vera RN  Infection Prevention: hand hygiene promoted  Goal: Anesthesia/Sedation  Recovery  Outcome: Progressing  Intervention: Optimize Anesthesia Recovery  Recent Flowsheet Documentation  Taken 1/18/2025 0600 by Madeline Vera RN  Safety Promotion/Fall Prevention: safety round/check completed  Taken 1/18/2025 0400 by Madeline Vera RN  Safety Promotion/Fall Prevention: safety round/check completed  Taken 1/18/2025 0200 by Madeline Vera RN  Safety Promotion/Fall Prevention: safety round/check completed  Taken 1/18/2025 0000 by Madeline Vera RN  Safety Promotion/Fall Prevention: safety round/check completed  Taken 1/17/2025 2200 by Madeline Vera RN  Safety Promotion/Fall Prevention: safety round/check completed  Taken 1/17/2025 2100 by Madeline Vera RN  Safety Promotion/Fall Prevention: safety round/check completed  Taken 1/17/2025 2000 by Madeline Vera RN  Safety Promotion/Fall Prevention:   safety round/check completed   toileting scheduled   room organization consistent   nonskid shoes/slippers when out of bed   fall prevention program maintained   clutter free environment maintained   assistive device/personal items within reach   activity supervised  Taken 1/17/2025 1900 by Madeline Vera RN  Safety Promotion/Fall Prevention: safety round/check completed  Goal: Acceptable Pain Control  Outcome: Progressing  Intervention: Prevent or Manage Pain  Recent Flowsheet Documentation  Taken 1/17/2025 2000 by Madeline Vera RN  Diversional Activities: television  Goal: Nausea and Vomiting Relief  Outcome: Progressing  Goal: Effective Urinary Elimination  Outcome: Progressing  Goal: Effective Oxygenation and Ventilation  Outcome: Progressing     Problem: Mechanical Ventilation Invasive  Goal: Effective Communication  Outcome: Progressing  Intervention: Ensure Effective Communication  Recent Flowsheet Documentation  Taken 1/17/2025 2000 by Madeline Vera RN  Trust Relationship/Rapport:   care explained   choices provided   questions answered   questions encouraged   reassurance provided  Diversional Activities:  television  Family/Support System Care:   support provided   self-care encouraged  Goal: Optimal Device Function  Outcome: Progressing  Goal: Mechanical Ventilation Liberation  Outcome: Progressing  Intervention: Promote Extubation and Mechanical Ventilation Liberation  Recent Flowsheet Documentation  Taken 1/17/2025 2000 by Madeline Vera RN  Medication Review/Management: medications reviewed  Goal: Optimal Nutrition Delivery  Outcome: Progressing  Goal: Absence of Device-Related Skin and Tissue Injury  Outcome: Progressing  Intervention: Maintain Skin and Tissue Health  Recent Flowsheet Documentation  Taken 1/17/2025 2000 by Madeline Vera RN  Device Skin Pressure Protection:   absorbent pad utilized/changed   adhesive use limited   pressure points protected  Goal: Absence of Ventilator-Induced Lung Injury  Outcome: Progressing  Intervention: Prevent Ventilator-Associated Pneumonia  Recent Flowsheet Documentation  Taken 1/18/2025 0600 by Madeline Vera RN  Head of Bed (HOB) Positioning: HOB elevated  Taken 1/18/2025 0400 by Madeline Vera RN  Head of Bed (HOB) Positioning: HOB elevated  Taken 1/18/2025 0200 by Madeline Vera RN  Head of Bed (HOB) Positioning: HOB elevated  Taken 1/18/2025 0000 by Madeline Vera RN  Head of Bed (HOB) Positioning: HOB elevated  Taken 1/17/2025 2200 by Madeline Vera RN  Head of Bed (HOB) Positioning: HOB elevated  Taken 1/17/2025 2100 by Madleine Vera RN  Head of Bed (HOB) Positioning: HOB elevated  Taken 1/17/2025 2000 by Madeline Vera RN  Head of Bed (HOB) Positioning: HOB elevated  Taken 1/17/2025 1900 by Madeline Vera RN  Head of Bed (HOB) Positioning: HOB elevated     Problem: Fall Injury Risk  Goal: Absence of Fall and Fall-Related Injury  Outcome: Progressing  Intervention: Identify and Manage Contributors  Recent Flowsheet Documentation  Taken 1/17/2025 2000 by Madeline Vera RN  Medication Review/Management: medications reviewed  Intervention: Promote Injury-Free Environment  Recent Flowsheet  Documentation  Taken 1/18/2025 0600 by Madeline Vera RN  Safety Promotion/Fall Prevention: safety round/check completed  Taken 1/18/2025 0400 by Madeline Vera RN  Safety Promotion/Fall Prevention: safety round/check completed  Taken 1/18/2025 0200 by Madeline Vera RN  Safety Promotion/Fall Prevention: safety round/check completed  Taken 1/18/2025 0000 by Madeline Vera RN  Safety Promotion/Fall Prevention: safety round/check completed  Taken 1/17/2025 2200 by Madeline Vera RN  Safety Promotion/Fall Prevention: safety round/check completed  Taken 1/17/2025 2100 by Madeline Vera RN  Safety Promotion/Fall Prevention: safety round/check completed  Taken 1/17/2025 2000 by Madeline Vera RN  Safety Promotion/Fall Prevention:   safety round/check completed   toileting scheduled   room organization consistent   nonskid shoes/slippers when out of bed   fall prevention program maintained   clutter free environment maintained   assistive device/personal items within reach   activity supervised  Taken 1/17/2025 1900 by Madeline Vera RN  Safety Promotion/Fall Prevention: safety round/check completed     Problem: Comorbidity Management  Goal: Blood Glucose Level Within Target Range  Outcome: Progressing  Intervention: Monitor and Manage Glycemia  Recent Flowsheet Documentation  Taken 1/17/2025 2000 by Madeline Vera RN  Medication Review/Management: medications reviewed  Goal: Blood Pressure in Desired Range  Outcome: Progressing  Intervention: Maintain Blood Pressure Management  Recent Flowsheet Documentation  Taken 1/17/2025 2000 by Madeline Vera RN  Medication Review/Management: medications reviewed   Goal Outcome Evaluation:  Plan of Care Reviewed With: patient        Progress: improving

## 2025-01-19 NOTE — OUTREACH NOTE
Prep Survey      Flowsheet Row Responses   [facility] facility patient discharged from? [facility]   Is LACE score < 7 ? No   Eligibility Readm Mgmt   Discharge diagnosis Coronary artery disease of native artery of native heart with stable angina pectoris, CABG x 3 using right leg vein harvest   Does the patient have one of the following disease processes/diagnoses(primary or secondary)? Cardiothoracic surgery   Does the patient have Home health ordered? No   Is there a DME ordered? No   Prep survey completed? Yes            Shari SILVA - Registered Nurse

## 2025-01-19 NOTE — THERAPY DISCHARGE NOTE
Acute Care - Physical Therapy Discharge Summary  [facility]       Patient Name: Nemo Lazaro  : 1960  MRN: [id number]    Today's Date: 2025                 Admit Date: 2025      PT Recommendation and Plan    Visit Dx:    ICD-10-CM ICD-9-CM   1. Impaired mobility [Z74.09]  Z74.09 799.89   2. Coronary artery disease of native artery of native heart with stable angina pectoris  I25.118 414.01     413.9        Outcome Measures       Row Name 25 1100             How much help from another person do you currently need...    Turning from your back to your side while in flat bed without using bedrails? 4  -KELSEY      Moving from lying on back to sitting on the side of a flat bed without bedrails? 3  -KELSEY      Moving to and from a bed to a chair (including a wheelchair)? 4  -KELSEY      Standing up from a chair using your arms (e.g., wheelchair, bedside chair)? 4  -KELSEY      Climbing 3-5 steps with a railing? 3  -KELSEY      To walk in hospital room? 3  -KELSEY      AM-PAC 6 Clicks Score (PT) 21  -KELSEY                User Key  (r) = Recorded By, (t) = Taken By, (c) = Cosigned By      Initials Name Provider Type    Anne Momin PTA Physical Therapist Assistant                         PT Rehab Goals       Row Name 25 1519             Bed Mobility Goal 1 (PT)    Activity/Assistive Device (Bed Mobility Goal 1, PT) sit to supine/supine to sit  -AH      [place] Level/Cues Needed (Bed Mobility Goal 1, PT) supervision required  -AH      Time Frame (Bed Mobility Goal 1, PT) long term goal (LTG);10 days  -AH      Progress/Outcomes (Bed Mobility Goal 1, PT) goal met  -AH         Transfer Goal 1 (PT)    Activity/Assistive Device (Transfer Goal 1, PT) sit-to-stand/stand-to-sit;bed-to-chair/chair-to-bed  -AH      [place] Level/Cues Needed (Transfer Goal 1, PT) supervision required  -AH      Time Frame (Transfer Goal 1, PT) long term goal (LTG);10 days  -AH      Progress/Outcome (Transfer Goal 1, PT)  goal met  -         Gait Training Goal 1 (PT)    Activity/Assistive Device (Gait Training Goal 1, PT) gait (walking locomotion);decrease fall risk;improve balance and speed;increase endurance/gait distance  -      [place] Level (Gait Training Goal 1, PT) supervision required  -      Distance (Gait Training Goal 1, PT) 250 ft  -      Time Frame (Gait Training Goal 1, PT) long term goal (LTG);10 days  -      Progress/Outcome (Gait Training Goal 1, PT) goal met  -                User Key  (r) = Recorded By, (t) = Taken By, (c) = Cosigned By      Initials Name Provider Type Discipline    Denia Carvalho PTA Physical Therapist Assistant PT                        PT Discharge Summary  Reason for Discharge: Discharge from facility  Outcomes Achieved: Refer to plan of care for updates on goals achieved  Discharge Destination: Home      Denia Boudreaux PTA   1/19/2025

## 2025-01-19 NOTE — PAYOR COMM NOTE
"DC  HOME 1-18-25    Praveena Lazaro (64 y.o. Female)       Date of Birth   1960    Social Security Number       Address   [address] [place] [address]    Home Phone   [phone]    MRN   [id number]       [facility]    Marital Status                               Admission Date   1/13/25    Admission Type   Elective    Admitting Provider   Lam Melgar MD    Attending Provider       Department, Room/Bed   [facility] 4B, 435/1       Discharge Date   1/18/2025    Discharge Disposition   Home or Self Care    Discharge Destination                                 Attending Provider: (none)   Allergies: Vancomycin, Penicillins    Isolation: None   Infection: None   Code Status: Prior    Ht: 163.4 cm (64.33\")   Wt: 86.1 kg (189 lb 12.8 oz)    Admission Cmt: None   Principal Problem: Coronary artery disease of native artery of native heart with stable angina pectoris [I25.118]                   Active Insurance as of 1/13/2025       Primary Coverage       Payor Plan Insurance Group Employer/Plan Group    [employer] [place] [employer] [id number]       Payor Plan Address Payor Plan Phone Number Payor Plan Fax Number Effective Dates    [address] [address] [phone]  10/19/2020 - None Entered    [facility] [id number]         Subscriber Name Subscriber Birth Date Member ID       PRAVEENA LAZARO 1960 [id number]                     Emergency Contacts        (Rel.) Home Phone Work Phone Mobile Phone    LAZAROSOLITARIO (Spouse) [phone] -- --    Sean De -- -- [phone]    Laura Lazaro -- -- [phone]    Oneil Lazaro -- -- [phone]              Discharge Summary    No notes of this type exist for this encounter.       "

## 2025-01-20 ENCOUNTER — OFFICE VISIT (OUTPATIENT)
Dept: CARDIAC SURGERY | Facility: CLINIC | Age: 65
End: 2025-01-20
Payer: COMMERCIAL

## 2025-01-20 VITALS
HEART RATE: 53 BPM | DIASTOLIC BLOOD PRESSURE: 68 MMHG | OXYGEN SATURATION: 98 % | HEIGHT: 64 IN | BODY MASS INDEX: 32.44 KG/M2 | SYSTOLIC BLOOD PRESSURE: 122 MMHG | WEIGHT: 190 LBS

## 2025-01-20 DIAGNOSIS — E11.65 TYPE 2 DIABETES MELLITUS WITH HYPERGLYCEMIA, WITH LONG-TERM CURRENT USE OF INSULIN: ICD-10-CM

## 2025-01-20 DIAGNOSIS — Z87.891 FORMER SMOKER: ICD-10-CM

## 2025-01-20 DIAGNOSIS — E66.811 CLASS 1 OBESITY: ICD-10-CM

## 2025-01-20 DIAGNOSIS — I25.118 CORONARY ARTERY DISEASE OF NATIVE ARTERY OF NATIVE HEART WITH STABLE ANGINA PECTORIS: Primary | ICD-10-CM

## 2025-01-20 DIAGNOSIS — Z79.4 TYPE 2 DIABETES MELLITUS WITH HYPERGLYCEMIA, WITH LONG-TERM CURRENT USE OF INSULIN: ICD-10-CM

## 2025-01-20 PROBLEM — I25.10 CAD (CORONARY ARTERY DISEASE): Status: RESOLVED | Noted: 2025-01-13 | Resolved: 2025-01-20

## 2025-01-20 PROCEDURE — 1160F RVW MEDS BY RX/DR IN RCRD: CPT | Performed by: NURSE PRACTITIONER

## 2025-01-20 PROCEDURE — 99024 POSTOP FOLLOW-UP VISIT: CPT | Performed by: NURSE PRACTITIONER

## 2025-01-20 PROCEDURE — 3078F DIAST BP <80 MM HG: CPT | Performed by: NURSE PRACTITIONER

## 2025-01-20 PROCEDURE — 1159F MED LIST DOCD IN RCRD: CPT | Performed by: NURSE PRACTITIONER

## 2025-01-20 PROCEDURE — 3074F SYST BP LT 130 MM HG: CPT | Performed by: NURSE PRACTITIONER

## 2025-01-20 RX ORDER — ACETAMINOPHEN 500 MG
1000 TABLET ORAL EVERY 6 HOURS PRN
COMMUNITY

## 2025-01-20 RX ORDER — ASPIRIN 81 MG/1
81 TABLET ORAL DAILY
COMMUNITY

## 2025-01-20 NOTE — PROGRESS NOTES
Subjective   Chief Complaint   Patient presents with    Post-op Follow-up     Pt is here for prevena removal. Pt had CABG x 3 on 01/13/2025       Patient ID: Nemo Lazaro is a 64 y.o. female who is here for follow-up having had Coronary artery bypass grafting-3 vessel (left internal mammary artery/left anterior descending, reverse saphenous vein graft/obtuse marginal, and reverse saphenous vein graft/posterior descending artery), Right endoscopic vein harvest, Application of Prevena Incision Management performed on 1/13/2025 by Dr. Melgar.     History of Present Illness  Post operative recovery was uneventful without any major complications.  She was discharged home on postop day 5.  She returns today for Prevena removal.  Joined by her spouse. Sleep habits are fair. Pain control has been adequate-they were unable to obtain Ultram from pharmacy at discharge on Saturday.  Unknown reason why. No fevers/sweats/chills. No drainage from incisions.  No sternal clicks.  Occasional shortness of breath with exertion. Appetite is fair. Glycemic control is good-she does not recall her BG this morning but states it was less than 150. Denies tobacco use. Ambulating 5 minutes twice daily.  She and  report a few medications at [facility] pharmacy yet to be picked up but they are unable to say which medications specifically.    The following portions of the patient's history were reviewed and updated as appropriate: allergies, current medications, past family history, past medical history, past social history, past surgical history and problem list.    Review of Systems   Constitutional:  Negative for chills, diaphoresis and fever.   Respiratory:  Positive for cough (clear sputum) and shortness of breath (with exertion).    Cardiovascular:  Positive for chest pain (incisional) and leg swelling.   Gastrointestinal:         Episode of incontinent stool today       Objective   Visit Vitals  /68   Pulse 53   Ht 163.4  "cm (64.33\")   Wt 86.2 kg (190 lb)   SpO2 98%   BMI 32.28 kg/m²       Physical Exam  Vitals reviewed.   Constitutional:       General: She is not in acute distress.     Appearance: She is well-developed. She is not diaphoretic.   HENT:      Head: Normocephalic.   Eyes:      Pupils: Pupils are equal, round, and reactive to light.   Neck:      Vascular: No JVD.   Cardiovascular:      Rate and Rhythm: Normal rate and regular rhythm. Bradycardia present.      Heart sounds: Normal heart sounds. No murmur heard.     No friction rub.   Pulmonary:      Effort: Pulmonary effort is normal. No respiratory distress.      Breath sounds: No stridor. No wheezing or rales.      Comments: Coarse breath sounds bilaterally, no wheezing, scattered rhonchi  Abdominal:      General: There is no distension.      Palpations: Abdomen is soft.      Tenderness: There is no abdominal tenderness.   Musculoskeletal:      Right lower leg: Edema (mild bilateral pedal) present.      Left lower leg: Edema present.   Skin:     General: Skin is warm and dry.      Coloration: Skin is not pale.      Findings: No erythema or rash.      Comments: Prevena removed without remark.  Sternal site with scab superiorly.  Site cleaned with CHG prep sponge and Steri-Strips applied per routine.  No drainage or evidence of infection.  Mediastinal chest tube sites are healing. No clicks. Sternum stable.   Saphenectomy site clean, dry, and intact with steri strips. No drainage.    Neurological:      Mental Status: She is alert and oriented to person, place, and time.      Cranial Nerves: No cranial nerve deficit.   Psychiatric:         Behavior: Behavior normal.         Assessment & Plan         Diagnoses and all orders for this visit:    1. Coronary artery disease of native artery of native heart with stable angina pectoris (Primary)    2. Type 2 diabetes mellitus with hyperglycemia, with long-term current use of insulin    3. Former smoker    4. Class 1 " obesity           Overall, Nemo Lazaro is doing well. Prevena removed without remark. Surgical incisions are clean and dry without evidence of infection. We discussed current sternotomy precautions and how these will not be advanced yet. Continue post op surgical wound care: shower daily with antibacterial soap and water, avoid use of lotions, creams, ointments, powders etc, allow steri strips to fall off on their own. Following post op cardiac surgery home instructions.  Discussed alternative pain control strategies including over-the-counter Tylenol and lidocaine patches.  They plan to go to the pharmacy today to obtain remaining prescriptions.  Medication reconciliation performed.  If they are unable to obtain tramadol prescription that was sent on Saturday, January 18 by Dr. Downing, they are to contact our office.  Continue diuretic regimen.  We discussed the use of compression stockings.  Follow up in 2 weeks for wound check.     We discussed the importance of strict glycemic control in the post operative setting and this impacts wound healing, infection risk, and future cardiovascular disease. Continue to follow with primary care for management of diabetes mellitus.     She has follow up with PCP, Dr. Buenrostro Friday 1/24/2025.     Provided support and encouragement. All questions have been answered to the best of my ability. Will discuss starting cardiac rehabilitation at official 1 month post op visit. Patient has follow up with Dr. Melgar in a month. Patient has follow up with Cardiology on March 3, 2025. Patient has been instructed to contact our office with any questions or concerns should they arise prior to the next office visit.     BMI is >= 30 and <35. (Class 1 Obesity). The following options were offered after discussion;: weight loss educational material (shared in after visit summary).      I have congratulated Nemo Lazaro on successful smoking cessation.   Nemo Lazaor   reports that she quit smoking about 2 months ago. Her smoking use included cigarettes. She started smoking about 58 years ago. She has a 57.8 pack-year smoking history. She has been exposed to tobacco smoke. She has never used smokeless tobacco.            Advance Care Planning   ACP discussion was declined by the patient. N/A as patient is under 65 years of age.

## 2025-01-23 ENCOUNTER — READMISSION MANAGEMENT (OUTPATIENT)
Dept: CALL CENTER | Facility: HOSPITAL | Age: 65
End: 2025-01-23
Payer: COMMERCIAL

## 2025-01-23 NOTE — OUTREACH NOTE
CT Surgery Week 1 Survey      Flowsheet Row Responses   [facility] patient discharged from? [place]   Does the patient have one of the following disease processes/diagnoses(primary or secondary)? Cardiothoracic surgery   Week 1 attempt successful? Yes   Call start time 1625   Call end time 1630   Discharge diagnosis Coronary artery disease of native artery of native heart with stable angina pectoris, CABG x 3 using right leg vein harvest   Meds reviewed with patient/caregiver? Yes   Is the patient having any side effects they believe may be caused by any medication additions or changes? No   Does the patient have all medications related to this admission filled (includes all antibiotics, pain medications, cardiac medications, etc.) Yes   Is the patient taking all medications as directed (includes completed medication regime)? Yes   Does the patient have a primary care provider?  Yes   Does the patient have an appointment scheduled with their C/T surgeon? Yes   Has the patient kept scheduled appointments due by today? N/A   Has home health visited the patient within 72 hours of discharge? N/A   Psychosocial issues? No   Did the patient receive a copy of their discharge instructions? Yes   Nursing interventions Reviewed instructions with patient   What is the patient's perception of their health status since discharge? Improving   Nursing interventions Nurse provided patient education   Is the patient/caregiver able to teach back normal signs of recovery? Pain or discomfort at incisional site   Nursing interventions Reassured on normal signs of recovery   Is the patient /caregiver able to teach back basic post-op care? Continue use of incentive spirometry at least 1 week post discharge, Practice cough and deep breath every 4 hours while awake, Hold pillow to support chest when coughing, Drive as instructed by MD in discharge instructions, Shower daily, No tub bath, swimming, or hot tub until instructed by MD,  Use a clean wash cloth and antibacterial bar or liquid soap to clean incisions, If the steri-strips are falling off, it is okay to remove them. (If applicable), Lifting as instructed by MD in discharge instructions   Is the patient/caregiver able to teach back signs and symptoms of incisional infection? Increased redness, swelling or pain at the incisonal site, Increased drainage or bleeding, Incisional warmth, Pus or odor from incision, Fever   Is the patient/caregiver able to teach back steps to recovery at home? Set small, achievable goals for return to baseline health, Rest and rebuild strength, gradually increase activity, Eat a well-balance diet   Is the patient /caregiver able to teach back the importance of cardiac rehab? Yes   If the patient is a current smoker, are they able to teach back resources for cessation? Not a smoker  [has not smoked in several months]   Is the patient/caregiver able to teach back the hierarchy of who to call/visit for symptoms/problems? PCP, Specialist, Home health nurse, Urgent Care, ED, 911 Yes   Week 1 call completed? Yes   Call end time 1630              Antoinette Gonzalez Registered Nurse

## 2025-02-03 ENCOUNTER — TELEPHONE (OUTPATIENT)
Dept: CARDIAC SURGERY | Facility: CLINIC | Age: 65
End: 2025-02-03
Payer: COMMERCIAL

## 2025-02-03 NOTE — TELEPHONE ENCOUNTER
Per verbal instruction from Gabby LUDWIG, attempted to call pt to resched appt currently on 2-5-25 to a different time.  No answer.  VM message left for pt to call back re: this.  If pt calls, please tx call to me so I can reschedule pt if possible/bc

## 2025-02-05 ENCOUNTER — OFFICE VISIT (OUTPATIENT)
Dept: CARDIAC SURGERY | Facility: CLINIC | Age: 65
End: 2025-02-05
Payer: COMMERCIAL

## 2025-02-05 VITALS
HEART RATE: 81 BPM | HEIGHT: 64 IN | BODY MASS INDEX: 31.86 KG/M2 | DIASTOLIC BLOOD PRESSURE: 82 MMHG | SYSTOLIC BLOOD PRESSURE: 127 MMHG | OXYGEN SATURATION: 96 % | WEIGHT: 186.6 LBS

## 2025-02-05 DIAGNOSIS — E66.811 CLASS 1 OBESITY: ICD-10-CM

## 2025-02-05 DIAGNOSIS — I25.118 CORONARY ARTERY DISEASE OF NATIVE ARTERY OF NATIVE HEART WITH STABLE ANGINA PECTORIS: Primary | ICD-10-CM

## 2025-02-05 DIAGNOSIS — E11.65 TYPE 2 DIABETES MELLITUS WITH HYPERGLYCEMIA, WITH LONG-TERM CURRENT USE OF INSULIN: ICD-10-CM

## 2025-02-05 DIAGNOSIS — Z87.891 FORMER SMOKER: ICD-10-CM

## 2025-02-05 DIAGNOSIS — Z79.4 TYPE 2 DIABETES MELLITUS WITH HYPERGLYCEMIA, WITH LONG-TERM CURRENT USE OF INSULIN: ICD-10-CM

## 2025-02-05 PROCEDURE — 1160F RVW MEDS BY RX/DR IN RCRD: CPT | Performed by: NURSE PRACTITIONER

## 2025-02-05 PROCEDURE — 1159F MED LIST DOCD IN RCRD: CPT | Performed by: NURSE PRACTITIONER

## 2025-02-05 PROCEDURE — 3074F SYST BP LT 130 MM HG: CPT | Performed by: NURSE PRACTITIONER

## 2025-02-05 PROCEDURE — 3079F DIAST BP 80-89 MM HG: CPT | Performed by: NURSE PRACTITIONER

## 2025-02-05 PROCEDURE — 99024 POSTOP FOLLOW-UP VISIT: CPT | Performed by: NURSE PRACTITIONER

## 2025-02-05 NOTE — PROGRESS NOTES
"Subjective   Chief Complaint   Patient presents with    Post-op Follow-up     Patient had CABG x 3 on 1/13       Patient ID: Nemo Lazaro is a 64 y.o. female who is here for follow-up having had Coronary artery bypass grafting-3 vessel (left internal mammary artery/left anterior descending, reverse saphenous vein graft/obtuse marginal, and reverse saphenous vein graft/posterior descending artery), Right endoscopic vein harvest, Application of Prevena Incision Management performed on 1/13/2025 by Dr. Melgar.     Post-op Follow-up    Post operative recovery was uneventful without any major complications.  She returns today for follow-up.  Joined by her spouse.  Overall she is doing well and says if she was not in pain from time to time, she feels like she could run a race.  She has taken 4 tramadol tablets since discharge home.  She does feel like she slept wrong last night, has right sided neck and shoulder pain although this is not uncommon for her given her chronic back issues. No fevers/sweats/chills. No drainage from surgical incisions.  Appetite is fair and improving.  Altered taste still.  Blood sugars running from 76-170s. Occasional sternal click but says she feels this more into right neck, right clavicle and shoulder.     Medication reconciliation performed.    The following portions of the patient's history were reviewed and updated as appropriate: allergies, current medications, past family history, past medical history, past social history, past surgical history and problem list.    Review of Systems   Constitutional:  Negative for chills, diaphoresis and fever.   Respiratory:  Positive for shortness of breath (with exertion).    Cardiovascular:  Positive for chest pain (incisional) and leg swelling.       Objective   Visit Vitals  /82   Pulse 81   Ht 163.4 cm (64.33\")   Wt 84.6 kg (186 lb 9.6 oz)   SpO2 96%   BMI 31.70 kg/m²       Physical Exam  Vitals reviewed.   Constitutional:       " General: She is not in acute distress.     Appearance: Normal appearance. She is well-developed. She is obese. She is not diaphoretic.   HENT:      Head: Normocephalic.   Eyes:      Pupils: Pupils are equal, round, and reactive to light.   Neck:      Vascular: No JVD.   Cardiovascular:      Rate and Rhythm: Normal rate and regular rhythm.      Heart sounds: Normal heart sounds. No murmur heard.     No friction rub.   Pulmonary:      Effort: Pulmonary effort is normal. No respiratory distress.      Breath sounds: Normal breath sounds. No stridor. No wheezing or rales.   Abdominal:      General: There is no distension.      Palpations: Abdomen is soft.      Tenderness: There is no abdominal tenderness.   Musculoskeletal:      Right lower leg: Edema (trace lower extremity edema) present.      Left lower leg: No edema.   Skin:     General: Skin is warm and dry.      Coloration: Skin is not pale.      Findings: No erythema or rash.      Comments: Sternal incision clean, dry, and intact.  3 loose Steri-Strips removed from distal incision.  Few scabs present over sternal incision.  No erythema or drainage or evidence of infection. Mediastinal chest tube sites with scabs. No clicks. Sternum stable.   Saphenectomy site clean, dry, and intact with steri strips. No drainage. Removed loose steri-Strips from right leg.   Neurological:      Mental Status: She is alert and oriented to person, place, and time.      Cranial Nerves: No cranial nerve deficit.   Psychiatric:         Behavior: Behavior normal.         Assessment & Plan         Diagnoses and all orders for this visit:    1. Coronary artery disease of native artery of native heart with stable angina pectoris (Primary)    2. Type 2 diabetes mellitus with hyperglycemia, with long-term current use of insulin    3. Class 1 obesity    4. Former smoker         Overall, Nemo Lazaro is doing well. Surgical incisions are clean and dry without evidence of infection. We  discussed current sternotomy precautions and how these will not be advanced yet. Continue post op surgical wound care: shower daily with antibacterial soap and water, avoid use of lotions, creams, ointments, powders etc, allow steri strips to fall off on their own. Following post op cardiac surgery home instructions.      We discussed the importance of strict glycemic control in the post operative setting and this impacts wound healing, infection risk, and future cardiovascular disease. Continue to follow with primary care for management of diabetes mellitus.     Provided support and encouragement. All questions have been answered to the best of my ability. Will discuss starting cardiac rehabilitation at official 1 month post op visit. Patient has follow up with Dr. Melgar in 20 days. Patient has follow up with Cardiology on March 3, 2025. Patient has been instructed to contact our office with any questions or concerns should they arise prior to the next office visit.     BMI is >= 30 and <35. (Class 1 Obesity). The following options were offered after discussion;: weight loss educational material (shared in after visit summary).      I have congratulated Nemo MAYA Lazaro on successful smoking cessation.   Nemo MAYA Lazaro  reports that she quit smoking about 2 months ago. Her smoking use included cigarettes. She started smoking about 58 years ago. She has a 57.8 pack-year smoking history. She has been exposed to tobacco smoke. She has never used smokeless tobacco.          Advance Care Planning   ACP discussion was declined by the patient. N/A as patient is under 65 years of age.

## 2025-02-11 RX ORDER — PANTOPRAZOLE SODIUM 40 MG/1
40 TABLET, DELAYED RELEASE ORAL EVERY MORNING
Qty: 30 TABLET | Refills: 0 | OUTPATIENT
Start: 2025-02-11

## 2025-02-25 ENCOUNTER — OFFICE VISIT (OUTPATIENT)
Dept: CARDIAC SURGERY | Facility: CLINIC | Age: 65
End: 2025-02-25
Payer: COMMERCIAL

## 2025-02-25 VITALS
WEIGHT: 183.4 LBS | SYSTOLIC BLOOD PRESSURE: 126 MMHG | HEART RATE: 81 BPM | HEIGHT: 64 IN | OXYGEN SATURATION: 95 % | DIASTOLIC BLOOD PRESSURE: 81 MMHG | BODY MASS INDEX: 31.31 KG/M2

## 2025-02-25 DIAGNOSIS — Z95.1 S/P CABG (CORONARY ARTERY BYPASS GRAFT): Primary | ICD-10-CM

## 2025-02-25 PROCEDURE — 3074F SYST BP LT 130 MM HG: CPT | Performed by: THORACIC SURGERY (CARDIOTHORACIC VASCULAR SURGERY)

## 2025-02-25 PROCEDURE — 1159F MED LIST DOCD IN RCRD: CPT | Performed by: THORACIC SURGERY (CARDIOTHORACIC VASCULAR SURGERY)

## 2025-02-25 PROCEDURE — 3079F DIAST BP 80-89 MM HG: CPT | Performed by: THORACIC SURGERY (CARDIOTHORACIC VASCULAR SURGERY)

## 2025-02-25 PROCEDURE — 1160F RVW MEDS BY RX/DR IN RCRD: CPT | Performed by: THORACIC SURGERY (CARDIOTHORACIC VASCULAR SURGERY)

## 2025-02-25 PROCEDURE — 99024 POSTOP FOLLOW-UP VISIT: CPT | Performed by: THORACIC SURGERY (CARDIOTHORACIC VASCULAR SURGERY)

## 2025-02-28 ENCOUNTER — TELEPHONE (OUTPATIENT)
Dept: CARDIAC SURGERY | Facility: CLINIC | Age: 65
End: 2025-02-28
Payer: COMMERCIAL

## 2025-02-28 NOTE — TELEPHONE ENCOUNTER
Orders have been placed for Cardiac Rehab at [facility]. Called their facility ([phone]) to obtain fax number. No answer. LM requesting a call back.    If they call back, please obtain their fax number and document that in this encounter. Thank you.

## 2025-02-28 NOTE — TELEPHONE ENCOUNTER
Merary Zhou returned call and provided fax number of [phone]. This has been faxed with all supporting documentation and noted in referral.

## 2025-03-02 NOTE — PROGRESS NOTES
Chief Complaint   Patient presents with    Post-op Follow-up     Patient had CABG x 3 on 1/13         History of Present Illness  The patient returns today following coronary artery bypass grafting (CABG) 3-vessel (left internal mammary artery/left anterior descending, reverse saphenous vein graft/obtuse marginal, and reverse saphenous vein graft/posterior descending artery), right endoscopic vein harvest, and application of Prevena incision management system performed on 01/13/2025.    She reports a satisfactory recovery, although she continues to experience soreness and discomfort during coughing. She was discharged home approximately 5 days post-procedure and has not experienced any palpitations. She did not receive amiodarone upon discharge. She reports an increase in energy levels and stamina, with a decrease in fatigue and sleepiness. She occasionally takes afternoon naps. She engages in walking exercises around her house for 40 minutes and sets a personal goal of at least two 20-minute walks per day. She also incorporates stretching into her routine. She reports a decrease in the sensation of rib shifting and bone popping. She experiences pain, strain, and a burning sensation in her leg.    Her blood glucose levels have been well-controlled, to the extent that she occasionally struggles to maintain them within the normal range. She has an upcoming appointment with her primary care physician on 03/06/2025 to discuss potential medication adjustments. She is currently on a regimen of alogliptin, metformin, and insulin. She also takes an additional dose of metformin. She reports experiencing night sweats, which she attributes to hypoglycemia, necessitating dietary intake to elevate her blood glucose levels.    MEDICATIONS  Current: alogliptin, metformin, insulin    The following portions of the patient's history were reviewed and updated as appropriate: allergies, current medications, past family history, past  "medical history, past social history, past surgical history and problem list.        Objective   Visit Vitals  /81   Pulse 81   Ht 163.4 cm (64.33\")   Wt 83.2 kg (183 lb 6.4 oz)   SpO2 95%   BMI 31.16 kg/m²       Physical Exam  Physical Exam    Physical Exam:    General: No acute distress, in good spirits  Cardiovascular: RRR, no murmur, rubs, or gallops.    Pulmonary: Clear to auscultation bilaterally, no wheezing, rubs, or rales.  Abdomen: Soft, nondistended, and nontender.  Extremities: Warm, moves all extremities.  Neurologic:  No focal deficits, CN II-XII intact grossly.    Sternum is stable with no clicks. Incision on the sternum is healing nicely. Right saphenous incision is healing nicely with no bruising or swelling.      No results found.  Results  Laboratory Studies  A1c was 7.3 before surgery.    Assessment & Plan       Diagnoses and all orders for this visit:    1. S/P CABG (coronary artery bypass graft) (Primary)  -     Ambulatory Referral to Cardiac Rehab      Assessment & Plan  1. Post-operative status following coronary artery bypass grafting (CABG).  She underwent coronary artery bypass grafting (CABG) 3-vessel (left internal mammary artery/left anterior descending, reverse saphenous vein graft/obtuse marginal, and reverse saphenous vein graft/posterior descending artery), right endoscopic vein harvest, and application of Prevena incision management system performed on 01/13/2025. Her recovery trajectory is satisfactory, with no reported palpitations. The surgical incisions are healing appropriately, and there is no evidence of bruising or swelling. The sternum is stable with no clicks. A referral for cardiac rehabilitation will be initiated. She is advised to gradually increase her weight-bearing capacity, starting with 10 pounds and adding an additional 5 pounds every other week. If she experiences discomfort while lifting, she should revert to the previous weight limit and maintain it for " an additional week. She is also encouraged to engage in physical activities such as walking, aiming for a minimum of 150 minutes per week.    2. Type 2 Diabetes Mellitus.  Her A1c was 7.3 before surgery. She reports that her blood sugar levels have been great, sometimes requiring efforts to keep them up. She is currently on alogliptin, metformin, and a fair amount of insulin. She is advised to discuss medication adjustments with her primary care doctor during her appointment on 03/06/2025. She is also advised to reduce insulin stimulation by cutting out sugar, considering intermittent fasting, and focusing on weight loss and exercise to increase insulin sensitivity.    Follow-up  The patient will follow up in 6 weeks.          She is a non smoker.    Many thanks for the opportunity to care for your patient.    I will continue to keep you apprised of provided care as it ensues.      Transcribed from ambient dictation for Lam Melgar MD by Lam Melgar MD.  03/02/25   17:09 CST    Patient or patient representative verbalized consent for the use of Ambient Listening during the visit with  Lam Melgar MD for chart documentation. 3/2/2025  17:14 CST

## 2025-03-17 ENCOUNTER — OFFICE VISIT (OUTPATIENT)
Dept: CARDIOLOGY | Facility: CLINIC | Age: 65
End: 2025-03-17
Payer: COMMERCIAL

## 2025-03-17 VITALS
BODY MASS INDEX: 31.76 KG/M2 | SYSTOLIC BLOOD PRESSURE: 133 MMHG | HEART RATE: 76 BPM | DIASTOLIC BLOOD PRESSURE: 83 MMHG | HEIGHT: 64 IN | WEIGHT: 186 LBS

## 2025-03-17 DIAGNOSIS — E78.2 MIXED HYPERLIPIDEMIA: Primary | ICD-10-CM

## 2025-03-17 DIAGNOSIS — I25.118 CORONARY ARTERY DISEASE OF NATIVE ARTERY OF NATIVE HEART WITH STABLE ANGINA PECTORIS: ICD-10-CM

## 2025-03-17 DIAGNOSIS — E11.65 TYPE 2 DIABETES MELLITUS WITH HYPERGLYCEMIA, WITH LONG-TERM CURRENT USE OF INSULIN: ICD-10-CM

## 2025-03-17 DIAGNOSIS — Z87.891 FORMER SMOKER: ICD-10-CM

## 2025-03-17 DIAGNOSIS — G47.33 OSA (OBSTRUCTIVE SLEEP APNEA): ICD-10-CM

## 2025-03-17 DIAGNOSIS — I10 PRIMARY HYPERTENSION: ICD-10-CM

## 2025-03-17 DIAGNOSIS — Z79.4 TYPE 2 DIABETES MELLITUS WITH HYPERGLYCEMIA, WITH LONG-TERM CURRENT USE OF INSULIN: ICD-10-CM

## 2025-03-17 NOTE — PROGRESS NOTES
Nemo Lazaro  [id number]  1960  64 y.o.  female    Referring Provider: Neymar Buenrostro MD    Reason for  Visit:Here for routine follow up     Subjective    Mild chronic exertional shortness of breath on exertion relieved with rest  No significant cough or wheezing    No palpitations  No associated chest pain  No significant pedal edema    No fever or chills  No significant expectoration    No hemoptysis  No presyncope or syncope    Compliant with prescribed medication regimen. Tries to adhere to cardiac diet.     Soreness of chest wall   Surgical wound healed very well. No evidence of excessive bruising, pain, redness, swelling, discharge or foul odor noted  Patient declined any recent history of fever, chills, pain or warmth locally     Not checking blood sugars regularly at home     BP in clinic as below      No bleeding, excessive bruising, gait instability or fall risks    Tolerating current medications well with no untoward side effects   Compliant with prescribed medication regimen. Tries to adhere to cardiac diet.       History of present illness:  Nemo Lazaro is a 64 y.o. yo female with coronary artery disease  who presents today for   Chief Complaint   Patient presents with    Coronary Artery Disease     6 wk d/c f/u   .    History  Past Medical History:   Diagnosis Date    Anxiety     Arthritis     HIPS, KNEES    Bowel incontinence     pt states sometimes    Cataracts, bilateral     Coronary artery disease     Depression     Diabetes mellitus     type 2    Diverticulosis     GERD (gastroesophageal reflux disease)     History of transfusion     Hyperlipidemia     Hypertension     Kidney stones     Shortness of breath     Sleep apnea     PT USES A CPAP MACHINE    Urinary frequency     AND URGENCY   ,   Past Surgical History:   Procedure Laterality Date    APPENDECTOMY      CARDIAC CATHETERIZATION N/A 10/23/2020    Procedure: Cardiac Catheterization/Vascular Study;  Surgeon:  Devante Wang MD;  Location:  [facility];  Service: Cardiology;  Laterality: N/A;    CARDIAC CATHETERIZATION Left 10/23/2024    Procedure: Cardiac Catheterization/Vascular Study;  Surgeon: Devante Wang MD;  Location:  [facility];  Service: Cardiology;  Laterality: Left;     SECTION      x2    CHOLECYSTECTOMY      CORONARY ARTERY BYPASS GRAFT N/A 2025    Procedure: CORONARY ARTERY BYPASS GRAFTING X3, LEFT INTERNAL MAMMARY ARTERY GRAFT, RIGHT LEG ENDOSCOPIC VEIN [place], TRANSESOPHAGEAL ECHOCARDIOGRAM, APPLICATION OF PREVENA;  Surgeon: Lam Melgar MD;  Location:  [facility] OR;  Service: Cardiothoracic;  Laterality: N/A;    HYSTERECTOMY     ,   History reviewed. No pertinent family history.,   Social History     Tobacco Use    Smoking status: Former     Current packs/day: 0.00     Average packs/day: 1 pack/day for 57.8 years (57.8 ttl pk-yrs)     Types: Cigarettes     Start date:      Quit date: 2024     Years since quittin.3     Passive exposure: Past    Smokeless tobacco: Never   Vaping Use    Vaping status: Never Used   Substance Use Topics    Alcohol use: Not Currently    Drug use: Never   ,     Medications  Current Outpatient Medications   Medication Sig Dispense Refill    acetaminophen (TYLENOL) 500 MG tablet Take 2 tablets by mouth Every 6 (Six) Hours As Needed for Moderate Pain.      Alogliptin-metFORMIN HCl (Kazano) 12.5-500 MG tablet Take 1 tablet by mouth 2 (two) times a day.      aspirin 81 MG chewable tablet Chew 1 tablet Daily.      aspirin 81 MG EC tablet Take 1 tablet by mouth Daily.      atorvastatin (LIPITOR) 40 MG tablet Take 1 tablet by mouth Every Night. 30 tablet 2    citalopram (CeleXA) 40 MG tablet Take 1 tablet by mouth Daily.      clopidogrel (PLAVIX) 75 MG tablet Take 1 tablet by mouth Daily. 30 tablet 2    insulin glargine (LANTUS, SEMGLEE) 100 UNIT/ML injection Inject 60 Units under the skin into the appropriate area as  "directed 2 (Two) Times a Day.      Klor-Con M20 20 MEQ CR tablet Take 1 tablet by mouth Daily.      levocetirizine (XYZAL) 5 MG tablet Take 1 tablet by mouth Daily.      lisinopril (PRINIVIL,ZESTRIL) 5 MG tablet Take 2 tablets by mouth Daily. 30 tablet 5    metFORMIN (GLUCOPHAGE) 500 MG tablet Take 1 tablet by mouth Every 12 (Twelve) Hours.      metoprolol tartrate (LOPRESSOR) 25 MG tablet Take 0.5 tablets by mouth Every 12 (Twelve) Hours. 30 tablet 5    omeprazole (priLOSEC) 40 MG capsule Take 1 capsule by mouth Daily.      pantoprazole (PROTONIX) 40 MG EC tablet Take 1 tablet by mouth Every Morning. 30 tablet 0    furosemide (Lasix) 20 MG tablet Take 1 tablet by mouth Daily for 5 days. 5 tablet 0    traMADol (ULTRAM) 50 MG tablet Take 0.5 tablets by mouth Every 6 (Six) Hours As Needed for Moderate Pain. (Patient not taking: Reported on 3/17/2025) 25 tablet 0     No current facility-administered medications for this visit.       Allergies:  Vancomycin and Penicillins    Review of Systems  Review of Systems   Constitutional: Negative.   HENT: Negative.     Eyes: Negative.    Cardiovascular:  Positive for chest pain and dyspnea on exertion. Negative for claudication, cyanosis, irregular heartbeat, leg swelling, near-syncope, orthopnea, palpitations, paroxysmal nocturnal dyspnea and syncope.   Respiratory: Negative.     Endocrine: Negative.    Hematologic/Lymphatic: Negative.    Skin: Negative.    Gastrointestinal:  Negative for anorexia.   Genitourinary: Negative.    Neurological: Negative.    Psychiatric/Behavioral: Negative.         Objective     Physical Exam:  /83   Pulse 76   Ht 162.6 cm (64\")   Wt 84.4 kg (186 lb)   BMI 31.93 kg/m²     Physical Exam  Constitutional:       Appearance: She is well-developed.   HENT:      Head: Normocephalic.   Neck:      Vascular: Normal carotid pulses. No carotid bruit or JVD.      Trachea: No tracheal tenderness or tracheal deviation.   Cardiovascular:      Rate and " Rhythm: Regular rhythm.      Pulses: Normal pulses.      Heart sounds: Normal heart sounds.   Pulmonary:      Effort: Pulmonary effort is normal.      Breath sounds: No stridor.   Abdominal:      Palpations: Abdomen is soft.   Musculoskeletal:      Cervical back: No edema.   Skin:     General: Skin is warm.   Neurological:      Mental Status: She is alert.      Cranial Nerves: No cranial nerve deficit.      Sensory: No sensory deficit.   Psychiatric:         Speech: Speech normal.         Behavior: Behavior normal.         Results Review:    Results for orders placed during the hospital encounter of 01/13/25    Intra-Op TAVR Transesophageal Echo    Interpretation Summary    Left ventricular systolic function is normal. Left ventricular ejection fraction appears to be 61 - 65%.    Normal right ventricular cavity size and systolic function noted.    The aortic valve is structurally normal with no regurgitation present. The aortic valve appears trileaflet. No hemodynamically significant aortic valve stenosis is present.    The mitral valve is structurally normal with no significant stenosis present. Trace mitral valve regurgitation is present.     Conclusion 10/23/2024     Normal left main coronary artery  Proximal left anterior descending coronary artery has heavy atherosclerotic burden with eccentric 70% stenosis  Mid left anterior descending coronary artery has 70% stenosis  Ostium of first diagonal branch has 70% stenosis  Second diagonal branch does not have any significant obstructive disease  Mid left circumflex coronary artery has 70% stenosis  Second obtuse marginal branch ostium has 70% stenosis  Right coronary artery is large and dominant  Mid right coronary artery has 70% calcified stenosis with heavy atherosclerotic burden  Bilateral subclavian and bilateral internal mammary's are patent that can be used as surgical conduit     Summary     Multivessel coronary artery disease as described above         Plan     Recommend aortocoronary bypass surgery  Hydration   Observation  Risk factor modifications        ____________________________________________________________________________________________________________________________________________  Health maintenance and recommendations    Low salt/ HTN/ Heart healthy carbohydrate restricted cardiac diet   The patient is advised to reduce or avoid caffeine or other cardiac stimulants.   Minimize or avoid  NSAID-type medications      Monitor for any signs of bleeding including red or dark stools. Fall precautions.   Advised staying uptodate with immunizations per established standard guidelines.    Offered to give patient  a copy of my notes     Questions were encouraged, asked and answered to the patient's  understanding and satisfaction. Questions if any regarding current medications and side effects, need for refills and importance of compliance to medications stressed.    Reviewed available prior notes, consults, prior visits, laboratory findings, radiology and cardiology relevant reports. Updated chart as applicable. I have reviewed the patient's medical history in detail and updated the computerized patient record as relevant.      Updated patient regarding any new or relevant abnormalities on review of records or any new findings on physical exam. Mentioned to patient about purpose of visit and desirable health short and long term goals and objectives.    Primary to monitor CBC CMP Lipid panel and TSH as applicable    ___________________________________________________________________________________________________________________________________________   Procedures    Assessment & Plan   Diagnoses and all orders for this visit:    1. Mixed hyperlipidemia (Primary)    2. Primary hypertension    3. Coronary artery disease of native artery of native heart with stable angina pectoris    4. Type 2 diabetes mellitus with hyperglycemia, with long-term current  use of insulin    5. LORENA (obstructive sleep apnea)    6. Former smoker          Plan      Start cardiac rehab in [facility]     Check BP and heart rates twice daily initially till blood pressures and heart rates under good control and then at least 3x / week,   If blood pressures continue to be well-controlled then can check week a month  at home and bring a recording for review next visit  If BP >130/85 or < 100/60 persistently over 3 reading 30 mins apart or if heart rates persistently above 100 bpm or less than 55 bpm call sooner for evaluation and advise     Keep LDL below 70 mg/dl. Monitor liver and renal functions.   Monitor CBC, CMP, TSH (as indicated) and Lipid Panel by primary     S/L NTG helps subside the chest pain within 5 mins of taking   She wants to be F/U in [facility]         Return in about 6 months (around 9/17/2025).

## 2025-04-09 ENCOUNTER — OFFICE VISIT (OUTPATIENT)
Dept: CARDIAC SURGERY | Facility: CLINIC | Age: 65
End: 2025-04-09
Payer: COMMERCIAL

## 2025-04-09 VITALS
HEIGHT: 64 IN | HEART RATE: 69 BPM | SYSTOLIC BLOOD PRESSURE: 154 MMHG | WEIGHT: 184 LBS | OXYGEN SATURATION: 98 % | BODY MASS INDEX: 31.41 KG/M2 | DIASTOLIC BLOOD PRESSURE: 80 MMHG

## 2025-04-09 DIAGNOSIS — R91.8 LUNG NODULES: ICD-10-CM

## 2025-04-09 DIAGNOSIS — Z95.1 S/P CABG (CORONARY ARTERY BYPASS GRAFT): Primary | ICD-10-CM

## 2025-04-09 PROCEDURE — 99024 POSTOP FOLLOW-UP VISIT: CPT | Performed by: THORACIC SURGERY (CARDIOTHORACIC VASCULAR SURGERY)

## 2025-04-09 PROCEDURE — 1160F RVW MEDS BY RX/DR IN RCRD: CPT | Performed by: THORACIC SURGERY (CARDIOTHORACIC VASCULAR SURGERY)

## 2025-04-09 PROCEDURE — 3077F SYST BP >= 140 MM HG: CPT | Performed by: THORACIC SURGERY (CARDIOTHORACIC VASCULAR SURGERY)

## 2025-04-09 PROCEDURE — 1159F MED LIST DOCD IN RCRD: CPT | Performed by: THORACIC SURGERY (CARDIOTHORACIC VASCULAR SURGERY)

## 2025-04-09 PROCEDURE — 3079F DIAST BP 80-89 MM HG: CPT | Performed by: THORACIC SURGERY (CARDIOTHORACIC VASCULAR SURGERY)

## 2025-04-21 NOTE — PROGRESS NOTES
"Chief Complaint   Patient presents with    Post-op Follow-up     Pt had Coronary Artery Bypass Grafting X3, Left Internal Mammary Artery Graft, Right Leg Endoscopic Vein [place], Transesophageal Echocardiogram, Application Of Prevena on 1/13/2025        History of Present Illness  The patient returns today for interval follow-up. She is accompanied by her granddaughter.    She did see me for her initial formal postoperative visit and was overall doing quite well. Routine education was advised, including improved and continued efforts towards improving her glycemic control. She was seen by Dr. Wang with ongoing cardiovascular risk factor modification advised. She reports experiencing pain during cardiac rehabilitation exercises, particularly in the chest bone area. She has been engaging in light household chores such as dusting and sweeping but has refrained from vacuuming and driving due to her 's concerns. She expresses frustration over her inability to lose weight despite her efforts. She has resumed her usual sleeping position on her stomach and no longer uses a pillow.    She has a history of smoking, with occasional relapses over the past 3 weeks.    She also mentions that her blood sugar levels are currently low.    SOCIAL HISTORY  The patient has smoked a couple of times in the past 3 weeks.    The following portions of the patient's history were reviewed and updated as appropriate: allergies, current medications, past family history, past medical history, past social history, past surgical history and problem list.        Objective   Visit Vitals  /80   Pulse 69   Ht 162.6 cm (64\")   Wt 83.5 kg (184 lb)   SpO2 98%   BMI 31.58 kg/m²       Physical Exam  Physical Exam  The patient is in no acute distress.  Lungs are clear to auscultation bilaterally without wheezing, rubs, or rales.  Heart has a regular rate and rhythm without murmurs, rubs, or gallops.  The sternum in the chest is stable and " healing nicely. There is some tenderness to palpation that is greater than expected at this point.  Abdomen is soft and nontender.  There is no clubbing, cyanosis or edema in the extremities.  The patient's mood is appropriate.      No results found.  Results  Laboratory Studies  Last A1c was 7.3.    Imaging  Ground glass nodules found in lungs.    Assessment & Plan       Diagnoses and all orders for this visit:    1. S/P CABG (coronary artery bypass graft) (Primary)    2. Lung nodules  -     CT Chest Without Contrast; Future      Assessment & Plan  1. Postoperative status.  She presents with persistent tenderness, which is not anticipated at this stage of her recovery. She has been advised to abstain from cardiac rehabilitation exercises and elliptical training for a duration of 4 weeks. She has been cleared for driving but should refrain from vacuuming. A consultation with Gabby Slaughter has been scheduled for 1 month from now to monitor her progress. If her condition improves, she will continue to see Gabby. However, if there are any concerns, she will return for a follow-up visit.    2. Ground glass nodules.  She has a history of smoking, quantified at 57 pack-years, which significantly increases her risk for lung cancer. A CT scan will be scheduled approximately 1 year from her last scan to monitor the ground glass nodules. A referral to Gabby Reardon has been made for further evaluation. If she experiences hemoptysis, unintentional weight loss, or new onset chest pain, she is to inform us immediately, and a CT scan will be expedited.    3. Diabetes mellitus.  Her diabetes remains uncontrolled, with her last A1c recorded at 7.3. She has been advised to continue working on weight loss and glycemic control. She should monitor her blood sugar levels closely and adjust her diet as needed.    Smoking cessation counseling has been offered.      Many thanks for the opportunity to care for your patient.    I will  continue to keep you apprised of provided care as it ensues.        Transcribed from ambient dictation for Lam Melgar MD by Lam Melgar MD.  04/20/25   23:01 CDT    Patient or patient representative verbalized consent for the use of Ambient Listening during the visit with  Lam Melgar MD for chart documentation. 4/20/2025  23:03 CDT

## 2025-04-29 ENCOUNTER — TELEPHONE (OUTPATIENT)
Dept: CARDIAC SURGERY | Facility: CLINIC | Age: 65
End: 2025-04-29
Payer: COMMERCIAL

## 2025-04-29 NOTE — TELEPHONE ENCOUNTER
Caller: Lazaro Nemo L    Relationship: Self    Best call back number: [phone]    Requested Prescriptions:   Requested Prescriptions      No prescriptions requested or ordered in this encounter    ATORVASTATIN 40MG  CLOPIDOGREL 75MG    Pharmacy where request should be sent:    [facility] [address] [place] [address].[facility] [address] [facility] [phone] [facility] [phone]  [phone]   Last office visit with prescribing clinician: Visit date not found   Last telemedicine visit with prescribing clinician: Visit date not found   Next office visit with prescribing clinician: Visit date not found     Additional details provided by patient: 1 TAB LEFT OF EAC     Does the patient have less than a 3 day supply:  [x] Yes  [] No    Would you like a call back once the refill request has been completed: [] Yes [x] No    If the office needs to give you a call back, can they leave a voicemail: [] Yes [x] No    Jonny Hatch Rep   04/29/25 12:13 CDT         DELETE AFTER READING TO PATIENT: “Thank you for sharing this information with me. I will send a message to the clinical team. Please allow 48 hours for the clinical staff to follow up on this request.”

## 2025-04-29 NOTE — TELEPHONE ENCOUNTER
Pt informed & voiced understanding.  Pt requested this message be forwarded to clinical staff for Dr Wang.  This has been done/bc

## 2025-05-01 RX ORDER — ATORVASTATIN CALCIUM 40 MG/1
40 TABLET, FILM COATED ORAL NIGHTLY
Qty: 90 TABLET | Refills: 3 | Status: CANCELLED | OUTPATIENT
Start: 2025-05-01

## 2025-05-01 RX ORDER — CLOPIDOGREL BISULFATE 75 MG/1
75 TABLET ORAL DAILY
Qty: 90 TABLET | Refills: 3 | Status: CANCELLED | OUTPATIENT
Start: 2025-05-01

## 2025-05-08 NOTE — PROGRESS NOTES
"Subjective   Chief Complaint   Patient presents with    Post-op Follow-up     Patient had CABG x 3 on 1/13.        Patient ID: Nemo Lazaro is a 64 y.o. female who is here for follow-up having had Coronary artery bypass grafting-3 vessel (left internal mammary artery/left anterior descending, reverse saphenous vein graft/obtuse marginal, and reverse saphenous vein graft/posterior descending artery), Right endoscopic vein harvest, Application of Prevena Incision Management performed on 1/13/2025 by Dr. Melgar.     Post-op Follow-up    Post operative recovery was uneventful without any major complications.  She returns today for follow-up of sternal incision. She was seen by Dr. Melgar on 4/9/2025. At that visit she was cleared to drive but advised to refrain from vacuuming.  She is joined by her spouse today.  Unfortunately, she has resumed smoking but is working to quit jointly with her spouse who is planning to have an LVAD placed in the near future.  She has not been checking her blood sugar and says she \"goes by how I feel \"and denies feeling like her blood sugar is high or low.  A couple weeks ago she rolled in the bed and had sudden onset of sternal pain.  She denies kip clicks or instability.  She reports that the pain resolved quickly over the next few minutes.  She believes this incident happened after her last visit with Dr. Melgar.  Has not been lifting anything heavier than a gallon of milk.  She has started cardiac rehab but is only performing leg exercises under direction of Dr. Melgar. Otherwise, denies additional new issues since last follow-up. Overall, she reports improvement in sternal pain over the last several weeks to months.     She has planned follow up in December 2025 with Gabby LUDWIG for follow up of ground glass lung nodules.    Current Outpatient Medications   Medication Sig Dispense Refill    acetaminophen (TYLENOL) 500 MG tablet Take 2 tablets by mouth Every 6 (Six) Hours " As Needed for Moderate Pain.      Alogliptin-metFORMIN HCl (Kazano) 12.5-500 MG tablet Take 1 tablet by mouth 2 (two) times a day.      aspirin 81 MG chewable tablet Chew 1 tablet Daily.      aspirin 81 MG EC tablet Take 1 tablet by mouth Daily.      citalopram (CeleXA) 40 MG tablet Take 1 tablet by mouth Daily.      insulin glargine (LANTUS, SEMGLEE) 100 UNIT/ML injection Inject 60 Units under the skin into the appropriate area as directed 2 (Two) Times a Day.      Klor-Con M20 20 MEQ CR tablet Take 1 tablet by mouth Daily.      levocetirizine (XYZAL) 5 MG tablet Take 1 tablet by mouth Daily.      lisinopril (PRINIVIL,ZESTRIL) 5 MG tablet Take 2 tablets by mouth Daily. 30 tablet 5    metFORMIN (GLUCOPHAGE) 500 MG tablet Take 1 tablet by mouth Every 12 (Twelve) Hours.      metoprolol tartrate (LOPRESSOR) 25 MG tablet Take 0.5 tablets by mouth Every 12 (Twelve) Hours. 30 tablet 5    omeprazole (priLOSEC) 40 MG capsule Take 1 capsule by mouth Daily.      pantoprazole (PROTONIX) 40 MG EC tablet Take 1 tablet by mouth Every Morning. 30 tablet 0    traMADol (ULTRAM) 50 MG tablet Take 0.5 tablets by mouth Every 6 (Six) Hours As Needed for Moderate Pain. 25 tablet 0    atorvastatin (LIPITOR) 40 MG tablet Take 1 tablet by mouth Every Night. (Patient not taking: Reported on 5/13/2025) 30 tablet 2    clopidogrel (PLAVIX) 75 MG tablet Take 1 tablet by mouth Daily. (Patient not taking: Reported on 5/13/2025) 30 tablet 2    furosemide (Lasix) 20 MG tablet Take 1 tablet by mouth Daily for 5 days. 5 tablet 0     No current facility-administered medications for this visit.     The following portions of the patient's history were reviewed and updated as appropriate: allergies, current medications, past family history, past medical history, past social history, past surgical history and problem list.    Review of Systems   Constitutional:  Negative for chills, diaphoresis and fever.   Respiratory:  Positive for cough, shortness of  "breath (with exertion) and wheezing (occasionally).    Cardiovascular:  Positive for chest pain (incisional, sternal, improving) and leg swelling.       Objective   Visit Vitals  /82   Pulse 74   Ht 162.6 cm (64\")   Wt 86.1 kg (189 lb 12.8 oz)   SpO2 96%   BMI 32.58 kg/m²         Physical Exam  Vitals reviewed.   Constitutional:       General: She is not in acute distress.     Appearance: Normal appearance. She is well-developed. She is obese. She is not diaphoretic.   HENT:      Head: Normocephalic.   Eyes:      Pupils: Pupils are equal, round, and reactive to light.   Neck:      Vascular: No JVD.   Cardiovascular:      Rate and Rhythm: Normal rate and regular rhythm.      Heart sounds: Normal heart sounds. No murmur heard.     No friction rub.   Pulmonary:      Effort: Pulmonary effort is normal. No respiratory distress.      Breath sounds: No stridor. Wheezing (RUL) present. No rales.   Abdominal:      General: There is no distension.      Palpations: Abdomen is soft.      Tenderness: There is no abdominal tenderness.   Musculoskeletal:      Right lower leg: No edema.      Left lower leg: No edema.   Skin:     General: Skin is warm and dry.      Coloration: Skin is not pale.      Findings: No erythema or rash.      Comments: Sternal incision well healed. At superior aspect, with cough effort there is mild sternal instability.   No clicks or instability felt over the remainder of the sternal incision.   Saphenectomy site well healed.    Neurological:      Mental Status: She is alert and oriented to person, place, and time.      Cranial Nerves: No cranial nerve deficit.   Psychiatric:         Behavior: Behavior normal.         Assessment & Plan         Diagnoses and all orders for this visit:    1. Sternal pain (Primary)    2. S/P CABG (coronary artery bypass graft)    3. Coronary artery disease of native artery of native heart with stable angina pectoris    4. Lung nodules    5. Smoker    6. Type 2 diabetes " mellitus with hyperglycemia, with long-term current use of insulin           Overall, Nemo Lazaro is doing well.  Surgical incisions themselves appear to be well-healed.  She does report ongoing but overall improved sternal pain.  Mild instability appreciated at the superior portion of the sternal  incision.  We discussed ongoing surveillance versus more aggressive measures including obtaining CT scan of the chest for consideration of sternal repair if indicated.  However, she has restarted smoking and has not been watching her blood sugars.  She reports having hemoglobin A1c drawn at the beginning of April by her PCP but she is unable to recall this measurement.  We discussed if sternal reconstruction/repair was wanted and warranted, she would need to be smoke-free and have well-demonstrated glycemic control.  With that being said, she does report improvement with conservative measures and I favor continuing surveillance with reassessment in 4 to 6 weeks.  No pain medication required. Okay to continue cardiac rehab while performing only leg exercises and we discussed the importance of walking.  Extensive time was spent discussing smoking cessation with regard to known lung nodules, coronary artery disease and vascular disease risk and how this pertains to postoperative coronary artery bypass grafting.  Again, she says she will work on quitting smoking as she is motivated by the fact that her  who is also a smoker needs to have an LVAD placed.    We discussed the importance of strict glycemic control in the post operative setting and this impacts wound healing, infection risk, and future cardiovascular disease. Continue to follow with primary care for management of diabetes mellitus.     Keep follow up in December 2025 with CT scan of the chest for further evaluation of ground glass lung nodules with Gabby LUDWIG.     Provided support and encouragement.   She will continue to follow with   Kathleen of the cardiology services.  Patient has been instructed to contact our office with any questions or concerns should they arise prior to the next office visit.     BMI is >= 30 and <35. (Class 1 Obesity). The following options were offered after discussion;: weight loss educational material (shared in after visit summary).      I have congratulated Nemo Lazaro on successful smoking cessation.   Nemo Peñagomery  reports that she has been smoking cigarettes. She started smoking about 58 years ago. She has a 57.9 pack-year smoking history. She has been exposed to tobacco smoke. She has never used smokeless tobacco.          Advance Care Planning   ACP discussion was declined by the patient. N/A as patient is under 65 years of age.

## 2025-05-13 ENCOUNTER — OFFICE VISIT (OUTPATIENT)
Dept: CARDIAC SURGERY | Facility: CLINIC | Age: 65
End: 2025-05-13
Payer: COMMERCIAL

## 2025-05-13 ENCOUNTER — TELEPHONE (OUTPATIENT)
Dept: CARDIOLOGY | Facility: CLINIC | Age: 65
End: 2025-05-13
Payer: COMMERCIAL

## 2025-05-13 VITALS
SYSTOLIC BLOOD PRESSURE: 133 MMHG | DIASTOLIC BLOOD PRESSURE: 82 MMHG | OXYGEN SATURATION: 96 % | WEIGHT: 189.8 LBS | HEIGHT: 64 IN | BODY MASS INDEX: 32.4 KG/M2 | HEART RATE: 74 BPM

## 2025-05-13 DIAGNOSIS — Z79.4 TYPE 2 DIABETES MELLITUS WITH HYPERGLYCEMIA, WITH LONG-TERM CURRENT USE OF INSULIN: ICD-10-CM

## 2025-05-13 DIAGNOSIS — R07.89 STERNAL PAIN: Primary | ICD-10-CM

## 2025-05-13 DIAGNOSIS — I25.118 CORONARY ARTERY DISEASE OF NATIVE ARTERY OF NATIVE HEART WITH STABLE ANGINA PECTORIS: ICD-10-CM

## 2025-05-13 DIAGNOSIS — R91.8 LUNG NODULES: ICD-10-CM

## 2025-05-13 DIAGNOSIS — Z95.1 S/P CABG (CORONARY ARTERY BYPASS GRAFT): ICD-10-CM

## 2025-05-13 DIAGNOSIS — F17.200 SMOKER: ICD-10-CM

## 2025-05-13 DIAGNOSIS — E11.65 TYPE 2 DIABETES MELLITUS WITH HYPERGLYCEMIA, WITH LONG-TERM CURRENT USE OF INSULIN: ICD-10-CM

## 2025-05-13 PROBLEM — E66.09 CLASS 1 OBESITY DUE TO EXCESS CALORIES WITH SERIOUS COMORBIDITY AND BODY MASS INDEX (BMI) OF 32.0 TO 32.9 IN ADULT: Status: ACTIVE | Noted: 2025-01-20

## 2025-05-13 PROCEDURE — 3075F SYST BP GE 130 - 139MM HG: CPT | Performed by: NURSE PRACTITIONER

## 2025-05-13 PROCEDURE — 99213 OFFICE O/P EST LOW 20 MIN: CPT | Performed by: NURSE PRACTITIONER

## 2025-05-13 PROCEDURE — 1159F MED LIST DOCD IN RCRD: CPT | Performed by: NURSE PRACTITIONER

## 2025-05-13 PROCEDURE — 3079F DIAST BP 80-89 MM HG: CPT | Performed by: NURSE PRACTITIONER

## 2025-05-13 PROCEDURE — 1160F RVW MEDS BY RX/DR IN RCRD: CPT | Performed by: NURSE PRACTITIONER

## 2025-05-13 RX ORDER — ATORVASTATIN CALCIUM 40 MG/1
40 TABLET, FILM COATED ORAL NIGHTLY
Qty: 90 TABLET | Refills: 3 | Status: SHIPPED | OUTPATIENT
Start: 2025-05-13

## 2025-05-13 RX ORDER — CLOPIDOGREL BISULFATE 75 MG/1
75 TABLET ORAL DAILY
Qty: 90 TABLET | Refills: 3 | Status: SHIPPED | OUTPATIENT
Start: 2025-05-13

## 2025-05-13 NOTE — TELEPHONE ENCOUNTER
Refills  Received: Today  Dyan Regan MA Mendiola, Isabella, MA; Candi Esparza MA  We had this patient in office today. She has run out of atorvastatin and Plavix and needs refills on these. She states she called the office, but I don't see any documentation of this.        They have been reordered at this time

## 2025-05-14 NOTE — PROGRESS NOTES
Received call from Dr. Buenrostro office, her hemoglobin A1c is 7.9% from the beginning of April 2025.

## 2025-06-13 RX ORDER — LISINOPRIL 5 MG/1
10 TABLET ORAL DAILY
Qty: 180 TABLET | Refills: 3 | Status: SHIPPED | OUTPATIENT
Start: 2025-06-13

## 2025-06-17 ENCOUNTER — TELEPHONE (OUTPATIENT)
Dept: CARDIAC SURGERY | Facility: CLINIC | Age: 65
End: 2025-06-17
Payer: COMMERCIAL

## 2025-06-17 NOTE — TELEPHONE ENCOUNTER
"Called patient to discuss.  Her  is going to be hospitalized in the ICU in [place] for the next couple months with plans for LVAD placement and after discharge they will be required to stay around the area for an additional month.  Reports she has quit smoking.  She is having no pain and able to do all activities that she wishes to do at home.  She even shampooed her carpets and rugs the other day without any issues.  Occasionally she will roll over in bed and feels \"rib moving under right breast\". We discussed this could be healing but the best way to know if with an office visit and possible imaging. She does not have pain with this. We discussed the purpose of this visit was to follow-up mild sternal instability and recheck her pain.  We discussed if she had ongoing pain and evidence of sternal nonunion then she could be considered for sternal reconstruction but she must have good glycemic control, free from smoking, and have no coughing or other URI risk factors at that time.  Again, she reports there are no issues and would like to cancel the office visit tomorrow.  I think this is reasonable given the above.  She does mention she is not excited about another surgery. She was advised she could call us back to reschedule a visit if any issues occur in the interim, but she does have follow-up in December with a CT scan of the chest for follow-up of groundglass lung nodules.  "

## 2025-06-17 NOTE — TELEPHONE ENCOUNTER
"Called pt to conf appt for 6/18/25 w/ Gabby LUDWIG.  Pt states she was going to call us re: this.  States they are getting ready to leave to go to [place] next week for  to have a procedure and they will be there for 3 months.  States she has so much to do, she is wondering if this appt tomorrow is \"absolutely necessary\".  She is requesting call back from Gabby re: this.  Can reach her at #[phone]/bc  "

## 2025-07-10 RX ORDER — METOPROLOL TARTRATE 25 MG/1
12.5 TABLET, FILM COATED ORAL EVERY 12 HOURS
Qty: 30 TABLET | Refills: 11 | Status: SHIPPED | OUTPATIENT
Start: 2025-07-10

## (undated) DEVICE — RADIFOCUS OPTITORQUE ANGIOGRAPHIC CATHETER: Brand: OPTITORQUE

## (undated) DEVICE — SKIN PREP TRAY W/CHG: Brand: MEDLINE INDUSTRIES, INC.

## (undated) DEVICE — TRAP FLD MINIVAC MEGADYNE 100ML

## (undated) DEVICE — PREVENA INCISION MANAGEMENT SYSTEM- PEEL & PLACE DRESSING: Brand: PREVENA™ PEEL & PLACE™

## (undated) DEVICE — TOOL INSRT GW MTL OR PLSTC

## (undated) DEVICE — ROTATING SURGICAL PUNCHES, 1 PER POUCH: Brand: A&E MEDICAL / ROTATING SURGICAL PUNCHES

## (undated) DEVICE — KT NDL GUIDE STRL 18GA

## (undated) DEVICE — MYNXGRIP 6F/7F: Brand: MYNXGRIP

## (undated) DEVICE — DRSNG SURESITE123 8X12IN

## (undated) DEVICE — STRIP,CLOSURE,WOUND,MEDI-STRIP,1/2X4: Brand: MEDLINE

## (undated) DEVICE — PAD, DEFIB, ADULT, RADIOTRANS, PHYSIO: Brand: MEDLINE

## (undated) DEVICE — GW STARTER FXD CORE J .035 3X150CM 3MM

## (undated) DEVICE — CANN NASL ETCO2 LO/FLO A/

## (undated) DEVICE — SOLIDIFIER LIQ LIQUILOC/PLUS W/TREAT 2000CC

## (undated) DEVICE — CANN CO2/O2 NASL A/

## (undated) DEVICE — PK OPN HEART 30

## (undated) DEVICE — SUT ETHIB 2/0 RB1 DA 30IN WHT MX523

## (undated) DEVICE — MODEL BT2000 P/N 700287-012KIT CONTENTS: MANIFOLD WITH SALINE AND CONTRAST PORTS, SALINE TUBING WITH SPIKE AND HAND SYRINGE, TRANSDUCER: Brand: BT2000 AUTOMATED MANIFOLD KIT

## (undated) DEVICE — PINNACLE INTRODUCER SHEATH: Brand: PINNACLE

## (undated) DEVICE — SCANLAN® SURG-I-PAW® INSTRUMENT COVERS - RED, 1/10" X 5"/ 3 MMX13 CM (2 - 5" PCS /PKG): Brand: SCANLAN® SURG-I-PAW® INSTRUMENT COVERS

## (undated) DEVICE — TB SXN SURG DLP MALL/CARD SFT/TP 6F 6IN

## (undated) DEVICE — KIT,ANTI FOG,W/SPONGE & FLUID,SOFT PACK: Brand: MEDLINE

## (undated) DEVICE — FR5 INFINITI MULTIPAC: Brand: INFINITI

## (undated) DEVICE — COPILOT BLEEDBACK CONTROL VALVE: Brand: COPILOT

## (undated) DEVICE — PATIENT RETURN ELECTRODE, SINGLE-USE, CONTACT QUALITY MONITORING, ADULT, WITH 9FT CORD, FOR PATIENTS WEIGING OVER 33LBS. (15KG): Brand: MEGADYNE

## (undated) DEVICE — PK CATH CARD 30 CA/4

## (undated) DEVICE — BLAKE SILICONE DRAINS CARDIO CONNECTOR 2:1: Brand: BLAKE

## (undated) DEVICE — 6F .070 JL4 100CM: Brand: CORDIS

## (undated) DEVICE — BLAKE SILICONE DRAIN, 19 FR ROUND, HUBLESS WITH 1/4" BENDABLE TROCAR: Brand: BLAKE

## (undated) DEVICE — OASIS DRAIN, SINGLE, INLINE & ATS COMPATIBLE: Brand: OASIS

## (undated) DEVICE — SOL IRR NACL 0.9PCT BT 1000ML

## (undated) DEVICE — GLV SURG BIOGEL LTX PF 7 1/2

## (undated) DEVICE — ADHS LIQ MASTISOL 2/3ML

## (undated) DEVICE — ELECTRD PAD DEFIB A/

## (undated) DEVICE — SYS VASOVIEW2 HEMOPRO ENDOSCOPIC HARVST VESL

## (undated) DEVICE — DRSNG SURESITE WNDW 4X4.5

## (undated) DEVICE — SUP ARMBRD ART/LINE BLU

## (undated) DEVICE — MODEL AT P65, P/N 701554-001KIT CONTENTS: HAND CONTROLLER, 3-WAY HIGH-PRESSURE STOPCOCK WITH ROTATING END AND PREMIUM HIGH-PRESSURE TUBING: Brand: ANGIOTOUCH® KIT

## (undated) DEVICE — BG OR ZSUT SADDLE 20IN CLR STRL

## (undated) DEVICE — GLIDESHEATH SLENDER STAINLESS STEEL KIT: Brand: GLIDESHEATH SLENDER

## (undated) DEVICE — PK SET UP ANES OPN HEART 30

## (undated) DEVICE — E-PACK PROCEDURE KIT: Brand: E-PACK

## (undated) DEVICE — SOLIDIFIER LIQUI LOC PLUS 2000CC

## (undated) DEVICE — 1/2 FORCE SURGICAL SPRING CLIP: Brand: STEALTH® SPRING CLIP

## (undated) DEVICE — STERNUM BLADE, OFFSET (32.0 X 0.8 X 6.3MM)

## (undated) DEVICE — A2000 MULTI-USE SYRINGE KIT, P/N 701277-003KIT CONTENTS: 100ML CONTRAST RESERVOIR AND TUBING WITH CONTRAST SPIKE AND CLAMP: Brand: A2000 MULTI-USE SYRINGE KIT

## (undated) DEVICE — SUT SILK 2/0 FS BLK 18IN 685G

## (undated) DEVICE — RESERVOIR,SUCTION,100CC,SILICONE: Brand: MEDLINE

## (undated) DEVICE — BLD SCLPL BEAVR MINI STR 2BVL 180D LF

## (undated) DEVICE — TR BAND RADIAL ARTERY COMPRESSION DEVICE: Brand: TR BAND

## (undated) DEVICE — DRAPE,ANGIO,BRACH,STERILE,38X44: Brand: MEDLINE

## (undated) DEVICE — DRSNG PRESS SAFEGUARD

## (undated) DEVICE — GW STARTER FXD/CORE PTFE/COAT J/TP/3MM .035IN 10X260CM

## (undated) DEVICE — SYS DISTNTION VEIN BONCHEK 300MMHG

## (undated) DEVICE — CLTH CLENS READYCLEANSE PERI CARE PK/5

## (undated) DEVICE — GW PRESSUREWIRE X WIRELESS FFR 175CM